# Patient Record
Sex: FEMALE | Race: WHITE | NOT HISPANIC OR LATINO | Employment: OTHER | URBAN - METROPOLITAN AREA
[De-identification: names, ages, dates, MRNs, and addresses within clinical notes are randomized per-mention and may not be internally consistent; named-entity substitution may affect disease eponyms.]

---

## 2017-06-30 ENCOUNTER — HOSPITAL ENCOUNTER (EMERGENCY)
Facility: HOSPITAL | Age: 57
Discharge: HOME | End: 2017-06-30
Attending: FAMILY MEDICINE
Payer: MEDICAID

## 2017-06-30 VITALS
DIASTOLIC BLOOD PRESSURE: 83 MMHG | SYSTOLIC BLOOD PRESSURE: 167 MMHG | RESPIRATION RATE: 18 BRPM | TEMPERATURE: 98 F | HEART RATE: 70 BPM | WEIGHT: 153 LBS
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
## 2017-06-30 DIAGNOSIS — S00.83XA FACIAL CONTUSION, INITIAL ENCOUNTER: Primary | ICD-10-CM

## 2017-06-30 PROCEDURE — 99284 EMERGENCY DEPT VISIT MOD MDM: CPT

## 2017-06-30 RX ORDER — VALPROIC ACID 250 MG/5ML
1000 SOLUTION ORAL 2 TIMES DAILY
COMMUNITY

## 2017-06-30 RX ORDER — TRAMADOL HYDROCHLORIDE 50 MG/1
50 TABLET ORAL EVERY 6 HOURS PRN
Status: ON HOLD | COMMUNITY
End: 2019-06-04 | Stop reason: HOSPADM

## 2017-06-30 RX ORDER — LISINOPRIL 20 MG/1
20 TABLET ORAL DAILY
Status: ON HOLD | COMMUNITY
End: 2017-08-23 | Stop reason: HOSPADM

## 2017-06-30 NOTE — ED PROVIDER NOTES
Encounter Date: 6/30/2017       History     Chief Complaint   Patient presents with    Fall     The history is provided by the patient. No  was used.   Fall   The accident occurred just prior to arrival. The fall occurred while standing. She fell from a height of 3 to 5 ft. She landed on a hard floor. The point of impact was the face. The pain is present in the right knee. The pain is at a severity of 2/10. She was ambulatory at the scene. There was no entrapment after the fall. There was no drug use involved in the accident. There was no alcohol use involved in the accident. Associated symptoms include neck pain. Pertinent negatives include no back pain, no paresthesias, no paralysis, no visual change, no fever, no numbness, no abdominal pain, no bowel incontinence, no nausea, no vomiting, no hematuria, no headaches, no hearing loss, no loss of consciousness and no tingling. She has tried nothing for the symptoms.     Patient was brought in from nursing home after falling today in the cafeteria and hitting her face.  Patient cannot give a good history but she does seem to complain of some nose pain and some right knee pain.  No nausea or vomiting.  No reported loss of consciousness.  No medication was given.    Review of patient's allergies indicates:  No Known Allergies  Past Medical History:   Diagnosis Date    Asthma     Coronary artery disease     Depression     GERD (gastroesophageal reflux disease)     Hypertension     Thyroid disease      No past surgical history on file.  No family history on file.  Social History   Substance Use Topics    Smoking status: Never Smoker    Smokeless tobacco: Not on file    Alcohol use No     Review of Systems   Constitutional: Negative for fever.   Gastrointestinal: Negative for abdominal pain, bowel incontinence, nausea and vomiting.   Genitourinary: Negative for hematuria.   Musculoskeletal: Positive for neck pain. Negative for back pain.    Neurological: Negative for tingling, loss of consciousness, numbness, headaches and paresthesias.       Physical Exam     Initial Vitals [06/30/17 0941]   BP Pulse Resp Temp SpO2   (!) 167/83 70 18 98 °F (36.7 °C) --      MAP       111         Physical Exam    Nursing note and vitals reviewed.  Constitutional: She appears well-developed and well-nourished.   Patient is alert cooperative and in no acute distress.   HENT:   Head: Normocephalic and atraumatic.   Right Ear: External ear normal.   Left Ear: External ear normal.   Nose: Nose normal.       Mouth/Throat: Oropharynx is clear and moist.   Eyes: Conjunctivae and EOM are normal. Pupils are equal, round, and reactive to light.   Neck: Normal range of motion. Neck supple. Spinous process tenderness present. No muscular tenderness present. No edema, no erythema and normal range of motion present. No neck rigidity.   Cardiovascular: Normal rate, regular rhythm and normal heart sounds.   Pulmonary/Chest: Breath sounds normal.   Abdominal: Soft. Bowel sounds are normal.   Musculoskeletal: Normal range of motion.        Right knee: She exhibits normal range of motion, no swelling, no effusion, no ecchymosis, no deformity, no laceration, no erythema, normal alignment, no LCL laxity, normal patellar mobility, no bony tenderness, normal meniscus and no MCL laxity. Tenderness (Superficial diffuse tenderness without instability or deformity noted.) found. No medial joint line, no lateral joint line, no MCL, no LCL and no patellar tendon tenderness noted.   Neurological: She is alert and oriented to person, place, and time. She has normal strength.   Skin: Skin is warm and dry.   Psychiatric: She has a normal mood and affect.         ED Course   Procedures  Labs Reviewed - No data to display                            ED Course     Clinical Impression:   The primary encounter diagnosis was Facial contusion, initial encounter. A diagnosis of Fall was also pertinent to this  visit.                           Justin Cherry MD  06/30/17 [id number]

## 2017-06-30 NOTE — PROVIDER PROGRESS NOTES - EMERGENCY DEPT.
Encounter Date: 6/30/2017    ED Physician Progress Notes        Physician Note:   CAT scan results discussed with the patient.  She has no new complaints.

## 2017-06-30 NOTE — ED TRIAGE NOTES
Here from [facility] with c/o fall from w/c this am. C/o nose and right knee pain

## 2017-07-12 PROBLEM — R17 JAUNDICE: Status: ACTIVE | Noted: 2017-07-12

## 2017-07-13 PROBLEM — R79.1 ELEVATED INR: Status: ACTIVE | Noted: 2017-07-13

## 2017-07-13 PROBLEM — K80.20 CHOLELITHIASIS: Status: ACTIVE | Noted: 2017-07-13

## 2017-07-13 PROBLEM — I10 HYPERTENSION: Status: ACTIVE | Noted: 2017-07-13

## 2017-07-13 PROBLEM — R74.8 ELEVATED LIVER ENZYMES: Status: ACTIVE | Noted: 2017-07-13

## 2017-07-13 PROBLEM — K80.51: Status: ACTIVE | Noted: 2017-07-13

## 2017-07-14 PROBLEM — B18.2 HEP C W/O COMA, CHRONIC: Status: ACTIVE | Noted: 2017-07-14

## 2017-07-14 PROBLEM — B96.20 E-COLI UTI: Status: ACTIVE | Noted: 2017-07-14

## 2017-07-14 PROBLEM — N39.0 E-COLI UTI: Status: ACTIVE | Noted: 2017-07-14

## 2017-07-18 ENCOUNTER — HOSPITAL ENCOUNTER (INPATIENT)
Facility: HOSPITAL | Age: 57
LOS: 1 days | Discharge: ANOTHER HOSPITAL | DRG: 444 | End: 2017-07-19
Attending: HOSPITALIST | Admitting: HOSPITALIST
Payer: MEDICAID

## 2017-07-18 DIAGNOSIS — R17 JAUNDICE: ICD-10-CM

## 2017-07-18 DIAGNOSIS — K80.01 CALCULUS OF GALLBLADDER WITH ACUTE CHOLECYSTITIS AND OBSTRUCTION: Primary | ICD-10-CM

## 2017-07-18 DIAGNOSIS — I25.10 CAD (CORONARY ARTERY DISEASE): ICD-10-CM

## 2017-07-18 PROBLEM — E03.9 HYPOTHYROID: Status: ACTIVE | Noted: 2017-07-18

## 2017-07-18 LAB
ALBUMIN SERPL BCP-MCNC: 2.4 G/DL
ALP SERPL-CCNC: 602 U/L
ALT SERPL W/O P-5'-P-CCNC: 293 U/L
AMMONIA PLAS-SCNC: 45 UMOL/L
ANION GAP SERPL CALC-SCNC: 13 MMOL/L
APTT BLDCRRT: 25.3 SEC
AST SERPL-CCNC: 215 U/L
BASOPHILS # BLD AUTO: 0.05 K/UL
BASOPHILS NFR BLD: 0.4 %
BILIRUB SERPL-MCNC: 14.3 MG/DL
BUN SERPL-MCNC: 10 MG/DL
CALCIUM SERPL-MCNC: 9.1 MG/DL
CHLORIDE SERPL-SCNC: 99 MMOL/L
CO2 SERPL-SCNC: 23 MMOL/L
CREAT SERPL-MCNC: 0.8 MG/DL
DIFFERENTIAL METHOD: ABNORMAL
EOSINOPHIL # BLD AUTO: 0.2 K/UL
EOSINOPHIL NFR BLD: 1.3 %
ERYTHROCYTE [DISTWIDTH] IN BLOOD BY AUTOMATED COUNT: 15.2 %
EST. GFR  (AFRICAN AMERICAN): >60 ML/MIN/1.73 M^2
EST. GFR  (NON AFRICAN AMERICAN): >60 ML/MIN/1.73 M^2
GLUCOSE SERPL-MCNC: 98 MG/DL
HCT VFR BLD AUTO: 31.6 %
HGB BLD-MCNC: 10.6 G/DL
INR PPP: 1
LYMPHOCYTES # BLD AUTO: 2.2 K/UL
LYMPHOCYTES NFR BLD: 17.5 %
MAGNESIUM SERPL-MCNC: 1.6 MG/DL
MCH RBC QN AUTO: 30.5 PG
MCHC RBC AUTO-ENTMCNC: 33.5 %
MCV RBC AUTO: 91 FL
MONOCYTES # BLD AUTO: 0.8 K/UL
MONOCYTES NFR BLD: 6.1 %
NEUTROPHILS # BLD AUTO: 9.3 K/UL
NEUTROPHILS NFR BLD: 72.9 %
PHOSPHATE SERPL-MCNC: 3 MG/DL
PLATELET # BLD AUTO: 170 K/UL
PMV BLD AUTO: 10.6 FL
POTASSIUM SERPL-SCNC: 3.3 MMOL/L
PROT SERPL-MCNC: 6.9 G/DL
PROTHROMBIN TIME: 10.7 SEC
RBC # BLD AUTO: 3.48 M/UL
SODIUM SERPL-SCNC: 135 MMOL/L
VALPROATE SERPL-MCNC: 27.5 UG/ML
WBC # BLD AUTO: 12.77 K/UL

## 2017-07-18 PROCEDURE — 82140 ASSAY OF AMMONIA: CPT

## 2017-07-18 PROCEDURE — 85730 THROMBOPLASTIN TIME PARTIAL: CPT

## 2017-07-18 PROCEDURE — 85610 PROTHROMBIN TIME: CPT | Mod: 91

## 2017-07-18 PROCEDURE — 0F798DZ DILATION OF COMMON BILE DUCT WITH INTRALUMINAL DEVICE, VIA NATURAL OR ARTIFICIAL OPENING ENDOSCOPIC: ICD-10-PCS | Performed by: INTERNAL MEDICINE

## 2017-07-18 PROCEDURE — 80164 ASSAY DIPROPYLACETIC ACD TOT: CPT

## 2017-07-18 PROCEDURE — 84443 ASSAY THYROID STIM HORMONE: CPT

## 2017-07-18 PROCEDURE — 36415 COLL VENOUS BLD VENIPUNCTURE: CPT

## 2017-07-18 PROCEDURE — 83735 ASSAY OF MAGNESIUM: CPT

## 2017-07-18 PROCEDURE — 80053 COMPREHEN METABOLIC PANEL: CPT | Mod: 91

## 2017-07-18 PROCEDURE — 85025 COMPLETE CBC W/AUTO DIFF WBC: CPT

## 2017-07-18 PROCEDURE — 25000003 PHARM REV CODE 250: Performed by: NURSE PRACTITIONER

## 2017-07-18 PROCEDURE — 84100 ASSAY OF PHOSPHORUS: CPT

## 2017-07-18 PROCEDURE — 84439 ASSAY OF FREE THYROXINE: CPT

## 2017-07-18 PROCEDURE — 11000001 HC ACUTE MED/SURG PRIVATE ROOM

## 2017-07-18 RX ORDER — ATORVASTATIN CALCIUM 20 MG/1
20 TABLET, FILM COATED ORAL DAILY
Status: DISCONTINUED | OUTPATIENT
Start: 2017-07-19 | End: 2017-07-19 | Stop reason: HOSPADM

## 2017-07-18 RX ORDER — DOCUSATE SODIUM 100 MG/1
100 CAPSULE, LIQUID FILLED ORAL 2 TIMES DAILY
Status: DISCONTINUED | OUTPATIENT
Start: 2017-07-18 | End: 2017-07-19 | Stop reason: HOSPADM

## 2017-07-18 RX ORDER — ISOSORBIDE MONONITRATE 30 MG/1
30 TABLET, EXTENDED RELEASE ORAL DAILY
Status: DISCONTINUED | OUTPATIENT
Start: 2017-07-19 | End: 2017-07-19 | Stop reason: HOSPADM

## 2017-07-18 RX ORDER — ACETAMINOPHEN 325 MG/1
650 TABLET ORAL EVERY 4 HOURS PRN
Status: DISCONTINUED | OUTPATIENT
Start: 2017-07-18 | End: 2017-07-19 | Stop reason: HOSPADM

## 2017-07-18 RX ORDER — SODIUM CHLORIDE 9 MG/ML
INJECTION, SOLUTION INTRAVENOUS CONTINUOUS
Status: DISCONTINUED | OUTPATIENT
Start: 2017-07-18 | End: 2017-07-19 | Stop reason: HOSPADM

## 2017-07-18 RX ORDER — TIOTROPIUM BROMIDE 18 UG/1
18 CAPSULE ORAL; RESPIRATORY (INHALATION) DAILY
Status: DISCONTINUED | OUTPATIENT
Start: 2017-07-19 | End: 2017-07-19 | Stop reason: HOSPADM

## 2017-07-18 RX ORDER — AMOXICILLIN 250 MG
1 CAPSULE ORAL 2 TIMES DAILY PRN
Status: DISCONTINUED | OUTPATIENT
Start: 2017-07-18 | End: 2017-07-19 | Stop reason: HOSPADM

## 2017-07-18 RX ORDER — LEVOTHYROXINE SODIUM 25 UG/1
25 TABLET ORAL
Status: DISCONTINUED | OUTPATIENT
Start: 2017-07-19 | End: 2017-07-19 | Stop reason: HOSPADM

## 2017-07-18 RX ORDER — MORPHINE SULFATE 2 MG/ML
2 INJECTION, SOLUTION INTRAMUSCULAR; INTRAVENOUS EVERY 4 HOURS PRN
Status: DISCONTINUED | OUTPATIENT
Start: 2017-07-18 | End: 2017-07-19 | Stop reason: HOSPADM

## 2017-07-18 RX ORDER — ASPIRIN 81 MG/1
81 TABLET ORAL DAILY
Status: DISCONTINUED | OUTPATIENT
Start: 2017-07-19 | End: 2017-07-19

## 2017-07-18 RX ORDER — TRAZODONE HYDROCHLORIDE 50 MG/1
100 TABLET ORAL NIGHTLY PRN
Status: DISCONTINUED | OUTPATIENT
Start: 2017-07-18 | End: 2017-07-19 | Stop reason: HOSPADM

## 2017-07-18 RX ORDER — FLUOXETINE HYDROCHLORIDE 20 MG/1
40 CAPSULE ORAL DAILY
Status: DISCONTINUED | OUTPATIENT
Start: 2017-07-19 | End: 2017-07-19 | Stop reason: HOSPADM

## 2017-07-18 RX ORDER — VALPROIC ACID 250 MG/5ML
1000 SOLUTION ORAL EVERY 12 HOURS
Status: DISCONTINUED | OUTPATIENT
Start: 2017-07-18 | End: 2017-07-19 | Stop reason: HOSPADM

## 2017-07-18 RX ORDER — OLANZAPINE 2.5 MG/1
7.5 TABLET ORAL NIGHTLY
Status: DISCONTINUED | OUTPATIENT
Start: 2017-07-18 | End: 2017-07-19 | Stop reason: HOSPADM

## 2017-07-18 RX ORDER — PANTOPRAZOLE SODIUM 40 MG/1
40 TABLET, DELAYED RELEASE ORAL DAILY
Status: DISCONTINUED | OUTPATIENT
Start: 2017-07-19 | End: 2017-07-19 | Stop reason: HOSPADM

## 2017-07-18 RX ORDER — RAMELTEON 8 MG/1
8 TABLET ORAL NIGHTLY PRN
Status: DISCONTINUED | OUTPATIENT
Start: 2017-07-18 | End: 2017-07-19 | Stop reason: HOSPADM

## 2017-07-18 RX ORDER — TRAMADOL HYDROCHLORIDE 50 MG/1
50 TABLET ORAL EVERY 6 HOURS PRN
Status: DISCONTINUED | OUTPATIENT
Start: 2017-07-18 | End: 2017-07-19 | Stop reason: HOSPADM

## 2017-07-18 RX ORDER — NITROGLYCERIN 0.4 MG/1
0.4 TABLET SUBLINGUAL EVERY 5 MIN PRN
Status: DISCONTINUED | OUTPATIENT
Start: 2017-07-18 | End: 2017-07-19 | Stop reason: HOSPADM

## 2017-07-18 RX ORDER — IPRATROPIUM BROMIDE AND ALBUTEROL SULFATE 2.5; .5 MG/3ML; MG/3ML
3 SOLUTION RESPIRATORY (INHALATION) EVERY 4 HOURS PRN
Status: DISCONTINUED | OUTPATIENT
Start: 2017-07-18 | End: 2017-07-19 | Stop reason: HOSPADM

## 2017-07-18 RX ORDER — LISINOPRIL 20 MG/1
20 TABLET ORAL DAILY
Status: DISCONTINUED | OUTPATIENT
Start: 2017-07-19 | End: 2017-07-19 | Stop reason: HOSPADM

## 2017-07-18 RX ADMIN — TRAMADOL HYDROCHLORIDE 50 MG: 50 TABLET, COATED ORAL at 11:07

## 2017-07-18 RX ADMIN — RAMELTEON 8 MG: 8 TABLET, FILM COATED ORAL at 11:07

## 2017-07-18 RX ADMIN — SODIUM CHLORIDE: 0.9 INJECTION, SOLUTION INTRAVENOUS at 10:07

## 2017-07-18 RX ADMIN — VALPROIC ACID 1000 MG: 250 SOLUTION ORAL at 10:07

## 2017-07-18 RX ADMIN — DOCUSATE SODIUM 100 MG: 100 CAPSULE, LIQUID FILLED ORAL at 11:07

## 2017-07-18 RX ADMIN — OLANZAPINE 7.5 MG: 2.5 TABLET, FILM COATED ORAL at 11:07

## 2017-07-19 ENCOUNTER — ANESTHESIA (OUTPATIENT)
Dept: INTENSIVE CARE | Facility: HOSPITAL | Age: 57
DRG: 444 | End: 2017-07-19
Payer: MEDICAID

## 2017-07-19 ENCOUNTER — HOSPITAL ENCOUNTER (INPATIENT)
Facility: HOSPITAL | Age: 57
LOS: 11 days | DRG: 064 | End: 2017-07-31
Attending: EMERGENCY MEDICINE | Admitting: PSYCHIATRY & NEUROLOGY
Payer: MEDICAID

## 2017-07-19 ENCOUNTER — TELEPHONE (OUTPATIENT)
Dept: GASTROENTEROLOGY | Facility: HOSPITAL | Age: 57
End: 2017-07-19

## 2017-07-19 ENCOUNTER — SURGERY (OUTPATIENT)
Age: 57
End: 2017-07-19

## 2017-07-19 ENCOUNTER — ANESTHESIA EVENT (OUTPATIENT)
Dept: INTENSIVE CARE | Facility: HOSPITAL | Age: 57
DRG: 444 | End: 2017-07-19
Payer: MEDICAID

## 2017-07-19 ENCOUNTER — TELEPHONE (OUTPATIENT)
Dept: GASTROENTEROLOGY | Facility: CLINIC | Age: 57
End: 2017-07-19

## 2017-07-19 VITALS
SYSTOLIC BLOOD PRESSURE: 88 MMHG | WEIGHT: 114.44 LBS | TEMPERATURE: 100 F | HEART RATE: 66 BPM | RESPIRATION RATE: 16 BRPM | DIASTOLIC BLOOD PRESSURE: 56 MMHG | OXYGEN SATURATION: 100 % | HEIGHT: 64 IN | BODY MASS INDEX: 19.54 KG/M2

## 2017-07-19 DIAGNOSIS — I61.9 RIGHT-SIDED NONTRAUMATIC INTRACEREBRAL HEMORRHAGE, UNSPECIFIED CEREBRAL LOCATION: Primary | ICD-10-CM

## 2017-07-19 DIAGNOSIS — I63.9 CEREBRAL INFARCTION: ICD-10-CM

## 2017-07-19 DIAGNOSIS — Z01.818 ENCOUNTER FOR INTUBATION: ICD-10-CM

## 2017-07-19 DIAGNOSIS — I61.2 NONTRAUMATIC INTRACEREBRAL HEMORRHAGE IN HEMISPHERE, UNSPECIFIED: ICD-10-CM

## 2017-07-19 DIAGNOSIS — R74.8 ELEVATED LIVER ENZYMES: ICD-10-CM

## 2017-07-19 DIAGNOSIS — G93.6 VASOGENIC CEREBRAL EDEMA: ICD-10-CM

## 2017-07-19 DIAGNOSIS — K72.91 HEPATIC COMA/ENCEPHALOPATHY: ICD-10-CM

## 2017-07-19 DIAGNOSIS — I62.9 SPONTANEOUS INTRAPARENCHYMAL INTRACRANIAL HEMORRHAGE, ACUTE: ICD-10-CM

## 2017-07-19 DIAGNOSIS — R17 JAUNDICE: ICD-10-CM

## 2017-07-19 DIAGNOSIS — K80.01 CALCULUS OF GALLBLADDER WITH ACUTE CHOLECYSTITIS AND OBSTRUCTION: ICD-10-CM

## 2017-07-19 DIAGNOSIS — K80.51: ICD-10-CM

## 2017-07-19 DIAGNOSIS — K80.11 CALCULUS OF GALLBLADDER WITH CHRONIC CHOLECYSTITIS WITH OBSTRUCTION: ICD-10-CM

## 2017-07-19 DIAGNOSIS — B18.2 CHRONIC HEPATITIS C WITH HEPATIC COMA: ICD-10-CM

## 2017-07-19 DIAGNOSIS — I61.9 ICH (INTRACEREBRAL HEMORRHAGE): ICD-10-CM

## 2017-07-19 DIAGNOSIS — I10 ESSENTIAL HYPERTENSION: ICD-10-CM

## 2017-07-19 PROBLEM — D72.829 LEUKOCYTOSIS: Status: ACTIVE | Noted: 2017-07-19

## 2017-07-19 LAB
ABO + RH BLD: NORMAL
ALBUMIN SERPL BCP-MCNC: 2.5 G/DL
ALLENS TEST: ABNORMAL
ALP SERPL-CCNC: 589 U/L
ALT SERPL W/O P-5'-P-CCNC: 244 U/L
AMMONIA PLAS-SCNC: 43 UMOL/L
AMYLASE SERPL-CCNC: 128 U/L
AST SERPL-CCNC: 132 U/L
BILIRUB DIRECT SERPL-MCNC: 7.3 MG/DL
BILIRUB SERPL-MCNC: 9.9 MG/DL
BILIRUB UR QL STRIP: NEGATIVE
BLD GP AB SCN CELLS X3 SERPL QL: NORMAL
CHOLEST/HDLC SERPL: 38.2 {RATIO}
CK MB SERPL-MCNC: 0.8 NG/ML
CK MB SERPL-RTO: 0.7 %
CK SERPL-CCNC: 107 U/L
CLARITY UR REFRACT.AUTO: CLEAR
COLOR UR AUTO: NORMAL
DELSYS: ABNORMAL
ERYTHROCYTE [SEDIMENTATION RATE] IN BLOOD BY WESTERGREN METHOD: 16 MM/H
FIO2: 40
GLUCOSE UR QL STRIP: NEGATIVE
HCO3 UR-SCNC: 24 MMOL/L (ref 24–28)
HDL/CHOLESTEROL RATIO: 2.6 %
HDLC SERPL-MCNC: 11 MG/DL
HDLC SERPL-MCNC: 420 MG/DL
HGB UR QL STRIP: NEGATIVE
INR PPP: 1
KETONES UR QL STRIP: NEGATIVE
LDLC SERPL CALC-MCNC: 342 MG/DL
LEUKOCYTE ESTERASE UR QL STRIP: NEGATIVE
LIPASE SERPL-CCNC: 66 U/L
MIN VOL: 6.67
MODE: ABNORMAL
NITRITE UR QL STRIP: NEGATIVE
NONHDLC SERPL-MCNC: 409 MG/DL
PCO2 BLDA: 26.4 MMHG (ref 35–45)
PEEP: 5
PH SMN: 7.57 [PH] (ref 7.35–7.45)
PH UR STRIP: 8 [PH] (ref 5–8)
PIP: 25
PO2 BLDA: 137 MMHG (ref 80–100)
POC BE: 2 MMOL/L
POC SATURATED O2: 99 % (ref 95–100)
POC TCO2: 25 MMOL/L (ref 23–27)
POCT GLUCOSE: 104 MG/DL (ref 70–110)
PROCALCITONIN SERPL IA-MCNC: 0.16 NG/ML
PROT SERPL-MCNC: 7.3 G/DL
PROT UR QL STRIP: NEGATIVE
PROTHROMBIN TIME: 10.9 SEC
SAMPLE: ABNORMAL
SITE: ABNORMAL
SODIUM SERPL-SCNC: 133 MMOL/L
SODIUM SERPL-SCNC: 134 MMOL/L
SP GR UR STRIP: 1.01 (ref 1–1.03)
SP02: 100
T4 FREE SERPL-MCNC: 1.18 NG/DL
T4 FREE SERPL-MCNC: 1.51 NG/DL
T4 SERPL-MCNC: 12.3 UG/DL
TRIGL SERPL-MCNC: 335 MG/DL
TROPONIN I SERPL DL<=0.01 NG/ML-MCNC: 0.01 NG/ML
TSH SERPL DL<=0.005 MIU/L-ACNC: 3.02 UIU/ML
TSH SERPL DL<=0.005 MIU/L-ACNC: 5.23 UIU/ML
URN SPEC COLLECT METH UR: NORMAL
UROBILINOGEN UR STRIP-ACNC: 4 EU/DL
VT: 400

## 2017-07-19 PROCEDURE — 83690 ASSAY OF LIPASE: CPT

## 2017-07-19 PROCEDURE — 85610 PROTHROMBIN TIME: CPT

## 2017-07-19 PROCEDURE — 25500020 PHARM REV CODE 255: Performed by: EMERGENCY MEDICINE

## 2017-07-19 PROCEDURE — 93005 ELECTROCARDIOGRAM TRACING: CPT

## 2017-07-19 PROCEDURE — 96360 HYDRATION IV INFUSION INIT: CPT | Mod: 59

## 2017-07-19 PROCEDURE — 94761 N-INVAS EAR/PLS OXIMETRY MLT: CPT

## 2017-07-19 PROCEDURE — 86900 BLOOD TYPING SEROLOGIC ABO: CPT

## 2017-07-19 PROCEDURE — 63600175 PHARM REV CODE 636 W HCPCS: Performed by: PHYSICIAN ASSISTANT

## 2017-07-19 PROCEDURE — 96368 THER/DIAG CONCURRENT INF: CPT

## 2017-07-19 PROCEDURE — 82553 CREATINE MB FRACTION: CPT

## 2017-07-19 PROCEDURE — 25000003 PHARM REV CODE 250: Performed by: HOSPITALIST

## 2017-07-19 PROCEDURE — 27000221 HC OXYGEN, UP TO 24 HOURS

## 2017-07-19 PROCEDURE — 99900035 HC TECH TIME PER 15 MIN (STAT)

## 2017-07-19 PROCEDURE — 84484 ASSAY OF TROPONIN QUANT: CPT

## 2017-07-19 PROCEDURE — 99233 SBSQ HOSP IP/OBS HIGH 50: CPT | Mod: ,,, | Performed by: PSYCHIATRY & NEUROLOGY

## 2017-07-19 PROCEDURE — 25000003 PHARM REV CODE 250: Performed by: PHYSICIAN ASSISTANT

## 2017-07-19 PROCEDURE — 36600 WITHDRAWAL OF ARTERIAL BLOOD: CPT

## 2017-07-19 PROCEDURE — 84439 ASSAY OF FREE THYROXINE: CPT

## 2017-07-19 PROCEDURE — 86850 RBC ANTIBODY SCREEN: CPT

## 2017-07-19 PROCEDURE — 25000003 PHARM REV CODE 250: Performed by: STUDENT IN AN ORGANIZED HEALTH CARE EDUCATION/TRAINING PROGRAM

## 2017-07-19 PROCEDURE — 99233 SBSQ HOSP IP/OBS HIGH 50: CPT | Mod: 25,,, | Performed by: PHYSICIAN ASSISTANT

## 2017-07-19 PROCEDURE — 20000000 HC ICU ROOM

## 2017-07-19 PROCEDURE — 94002 VENT MGMT INPAT INIT DAY: CPT

## 2017-07-19 PROCEDURE — 86920 COMPATIBILITY TEST SPIN: CPT

## 2017-07-19 PROCEDURE — 93010 ELECTROCARDIOGRAM REPORT: CPT | Mod: ,,, | Performed by: INTERNAL MEDICINE

## 2017-07-19 PROCEDURE — 96366 THER/PROPH/DIAG IV INF ADDON: CPT

## 2017-07-19 PROCEDURE — 82140 ASSAY OF AMMONIA: CPT

## 2017-07-19 PROCEDURE — 84443 ASSAY THYROID STIM HORMONE: CPT

## 2017-07-19 PROCEDURE — 82962 GLUCOSE BLOOD TEST: CPT

## 2017-07-19 PROCEDURE — 99291 CRITICAL CARE FIRST HOUR: CPT | Mod: 25

## 2017-07-19 PROCEDURE — 25000003 PHARM REV CODE 250: Performed by: NURSE PRACTITIONER

## 2017-07-19 PROCEDURE — 99233 SBSQ HOSP IP/OBS HIGH 50: CPT | Mod: ,,, | Performed by: INTERNAL MEDICINE

## 2017-07-19 PROCEDURE — 5A1955Z RESPIRATORY VENTILATION, GREATER THAN 96 CONSECUTIVE HOURS: ICD-10-PCS | Performed by: PSYCHIATRY & NEUROLOGY

## 2017-07-19 PROCEDURE — 63600175 PHARM REV CODE 636 W HCPCS: Performed by: STUDENT IN AN ORGANIZED HEALTH CARE EDUCATION/TRAINING PROGRAM

## 2017-07-19 PROCEDURE — 96365 THER/PROPH/DIAG IV INF INIT: CPT

## 2017-07-19 PROCEDURE — 87040 BLOOD CULTURE FOR BACTERIA: CPT | Mod: 59

## 2017-07-19 PROCEDURE — 36620 INSERTION CATHETER ARTERY: CPT | Mod: ,,, | Performed by: NURSE PRACTITIONER

## 2017-07-19 PROCEDURE — 51702 INSERT TEMP BLADDER CATH: CPT

## 2017-07-19 PROCEDURE — 83036 HEMOGLOBIN GLYCOSYLATED A1C: CPT

## 2017-07-19 PROCEDURE — 84295 ASSAY OF SERUM SODIUM: CPT | Mod: 91

## 2017-07-19 PROCEDURE — 84436 ASSAY OF TOTAL THYROXINE: CPT

## 2017-07-19 PROCEDURE — 81003 URINALYSIS AUTO W/O SCOPE: CPT

## 2017-07-19 PROCEDURE — 99291 CRITICAL CARE FIRST HOUR: CPT | Mod: ,,, | Performed by: EMERGENCY MEDICINE

## 2017-07-19 PROCEDURE — 80061 LIPID PANEL: CPT

## 2017-07-19 PROCEDURE — 84145 PROCALCITONIN (PCT): CPT

## 2017-07-19 PROCEDURE — 96361 HYDRATE IV INFUSION ADD-ON: CPT | Mod: 59

## 2017-07-19 PROCEDURE — 99284 EMERGENCY DEPT VISIT MOD MDM: CPT | Mod: ,,, | Performed by: PHYSICIAN ASSISTANT

## 2017-07-19 PROCEDURE — 63600175 PHARM REV CODE 636 W HCPCS: Performed by: NURSE PRACTITIONER

## 2017-07-19 PROCEDURE — 63600175 PHARM REV CODE 636 W HCPCS: Performed by: EMERGENCY MEDICINE

## 2017-07-19 PROCEDURE — 96375 TX/PRO/DX INJ NEW DRUG ADDON: CPT

## 2017-07-19 PROCEDURE — 63600175 PHARM REV CODE 636 W HCPCS: Performed by: HOSPITALIST

## 2017-07-19 PROCEDURE — 82803 BLOOD GASES ANY COMBINATION: CPT

## 2017-07-19 PROCEDURE — 80076 HEPATIC FUNCTION PANEL: CPT

## 2017-07-19 PROCEDURE — 99900026 HC AIRWAY MAINTENANCE (STAT)

## 2017-07-19 PROCEDURE — 82150 ASSAY OF AMYLASE: CPT

## 2017-07-19 RX ORDER — LEVETIRACETAM 5 MG/ML
500 INJECTION INTRAVASCULAR EVERY 12 HOURS
Status: DISCONTINUED | OUTPATIENT
Start: 2017-07-20 | End: 2017-07-24

## 2017-07-19 RX ORDER — HYDRALAZINE HYDROCHLORIDE 20 MG/ML
10 INJECTION INTRAMUSCULAR; INTRAVENOUS EVERY 4 HOURS PRN
Status: DISCONTINUED | OUTPATIENT
Start: 2017-07-19 | End: 2017-07-19 | Stop reason: HOSPADM

## 2017-07-19 RX ORDER — FAMOTIDINE 10 MG/ML
20 INJECTION INTRAVENOUS 2 TIMES DAILY
Status: DISCONTINUED | OUTPATIENT
Start: 2017-07-19 | End: 2017-07-19 | Stop reason: HOSPADM

## 2017-07-19 RX ORDER — HYDRALAZINE HYDROCHLORIDE 20 MG/ML
10 INJECTION INTRAMUSCULAR; INTRAVENOUS ONCE
Status: COMPLETED | OUTPATIENT
Start: 2017-07-19 | End: 2017-07-19

## 2017-07-19 RX ORDER — CHLORHEXIDINE GLUCONATE ORAL RINSE 1.2 MG/ML
15 SOLUTION DENTAL 2 TIMES DAILY
Status: DISCONTINUED | OUTPATIENT
Start: 2017-07-19 | End: 2017-07-25

## 2017-07-19 RX ORDER — CHLORHEXIDINE GLUCONATE ORAL RINSE 1.2 MG/ML
15 SOLUTION DENTAL 2 TIMES DAILY
Status: DISCONTINUED | OUTPATIENT
Start: 2017-07-19 | End: 2017-07-19 | Stop reason: HOSPADM

## 2017-07-19 RX ORDER — FAMOTIDINE 20 MG/1
20 TABLET, FILM COATED ORAL 2 TIMES DAILY
Status: DISCONTINUED | OUTPATIENT
Start: 2017-07-19 | End: 2017-07-25

## 2017-07-19 RX ORDER — NICARDIPINE HYDROCHLORIDE 0.2 MG/ML
2.5 INJECTION INTRAVENOUS CONTINUOUS
Status: DISCONTINUED | OUTPATIENT
Start: 2017-07-19 | End: 2017-07-19 | Stop reason: HOSPADM

## 2017-07-19 RX ORDER — DEXMEDETOMIDINE HYDROCHLORIDE 4 UG/ML
0.2 INJECTION, SOLUTION INTRAVENOUS CONTINUOUS
Status: DISCONTINUED | OUTPATIENT
Start: 2017-07-19 | End: 2017-07-20

## 2017-07-19 RX ORDER — LACTULOSE 10 G/15ML
20 SOLUTION ORAL 3 TIMES DAILY
Status: DISCONTINUED | OUTPATIENT
Start: 2017-07-19 | End: 2017-07-19 | Stop reason: HOSPADM

## 2017-07-19 RX ORDER — METRONIDAZOLE 500 MG/100ML
500 INJECTION, SOLUTION INTRAVENOUS
Status: DISCONTINUED | OUTPATIENT
Start: 2017-07-19 | End: 2017-07-19 | Stop reason: HOSPADM

## 2017-07-19 RX ORDER — NICARDIPINE HYDROCHLORIDE 0.2 MG/ML
2.5 INJECTION INTRAVENOUS CONTINUOUS
Status: DISCONTINUED | OUTPATIENT
Start: 2017-07-19 | End: 2017-07-22

## 2017-07-19 RX ORDER — CEFTRIAXONE 1 G/1
1 INJECTION, POWDER, FOR SOLUTION INTRAMUSCULAR; INTRAVENOUS
Status: DISCONTINUED | OUTPATIENT
Start: 2017-07-19 | End: 2017-07-19 | Stop reason: HOSPADM

## 2017-07-19 RX ORDER — LACTULOSE 10 G/15ML
10 SOLUTION ORAL 3 TIMES DAILY
Status: DISCONTINUED | OUTPATIENT
Start: 2017-07-19 | End: 2017-07-28

## 2017-07-19 RX ORDER — PROPOFOL 10 MG/ML
20 INJECTION, EMULSION INTRAVENOUS
Status: DISCONTINUED | OUTPATIENT
Start: 2017-07-19 | End: 2017-07-19

## 2017-07-19 RX ORDER — FENTANYL CITRATE 50 UG/ML
50 INJECTION, SOLUTION INTRAMUSCULAR; INTRAVENOUS
Status: DISCONTINUED | OUTPATIENT
Start: 2017-07-19 | End: 2017-07-19 | Stop reason: HOSPADM

## 2017-07-19 RX ORDER — LEVOTHYROXINE SODIUM 25 UG/1
25 TABLET ORAL
Status: DISCONTINUED | OUTPATIENT
Start: 2017-07-19 | End: 2017-07-26

## 2017-07-19 RX ORDER — LEVETIRACETAM 5 MG/ML
1000 INJECTION INTRAVASCULAR
Status: COMPLETED | OUTPATIENT
Start: 2017-07-19 | End: 2017-07-19

## 2017-07-19 RX ORDER — ETOMIDATE 2 MG/ML
INJECTION INTRAVENOUS
Status: DISCONTINUED | OUTPATIENT
Start: 2017-07-19 | End: 2017-07-19 | Stop reason: HOSPADM

## 2017-07-19 RX ORDER — SUCCINYLCHOLINE CHLORIDE 20 MG/ML
INJECTION INTRAMUSCULAR; INTRAVENOUS
Status: DISCONTINUED | OUTPATIENT
Start: 2017-07-19 | End: 2017-07-19 | Stop reason: HOSPADM

## 2017-07-19 RX ORDER — PROPOFOL 10 MG/ML
INJECTION, EMULSION INTRAVENOUS
Status: DISPENSED
Start: 2017-07-19 | End: 2017-07-20

## 2017-07-19 RX ORDER — SODIUM CHLORIDE 9 MG/ML
INJECTION, SOLUTION INTRAVENOUS CONTINUOUS
Status: DISCONTINUED | OUTPATIENT
Start: 2017-07-19 | End: 2017-07-29

## 2017-07-19 RX ORDER — FENTANYL CITRATE 50 UG/ML
100 INJECTION, SOLUTION INTRAMUSCULAR; INTRAVENOUS
Status: COMPLETED | OUTPATIENT
Start: 2017-07-19 | End: 2017-07-19

## 2017-07-19 RX ORDER — PROPOFOL 10 MG/ML
20 INJECTION, EMULSION INTRAVENOUS
Status: COMPLETED | OUTPATIENT
Start: 2017-07-19 | End: 2017-07-19

## 2017-07-19 RX ORDER — SODIUM CHLORIDE 0.9 % (FLUSH) 0.9 %
3 SYRINGE (ML) INJECTION EVERY 8 HOURS
Status: DISCONTINUED | OUTPATIENT
Start: 2017-07-19 | End: 2017-07-31 | Stop reason: HOSPADM

## 2017-07-19 RX ADMIN — DEXMEDETOMIDINE HYDROCHLORIDE 0.2 MCG/KG/HR: 4 INJECTION, SOLUTION INTRAVENOUS at 09:07

## 2017-07-19 RX ADMIN — NICARDIPINE HYDROCHLORIDE 2.5 MG/HR: 0.2 INJECTION, SOLUTION INTRAVENOUS at 12:07

## 2017-07-19 RX ADMIN — TIOTROPIUM BROMIDE 18 MCG: 18 CAPSULE ORAL; RESPIRATORY (INHALATION) at 08:07

## 2017-07-19 RX ADMIN — LEVETIRACETAM 1000 MG: 5 INJECTION INTRAVENOUS at 04:07

## 2017-07-19 RX ADMIN — HYDRALAZINE HYDROCHLORIDE 10 MG: 20 INJECTION INTRAMUSCULAR; INTRAVENOUS at 09:07

## 2017-07-19 RX ADMIN — PIPERACILLIN AND TAZOBACTAM 4.5 G: 4; .5 INJECTION, POWDER, LYOPHILIZED, FOR SOLUTION INTRAVENOUS; PARENTERAL at 08:07

## 2017-07-19 RX ADMIN — SUCCINYLCHOLINE CHLORIDE 70 MG: 20 INJECTION, SOLUTION INTRAMUSCULAR; INTRAVENOUS at 12:07

## 2017-07-19 RX ADMIN — CHLORHEXIDINE GLUCONATE 15 ML: 1.2 RINSE ORAL at 08:07

## 2017-07-19 RX ADMIN — SODIUM CHLORIDE: 0.9 INJECTION, SOLUTION INTRAVENOUS at 03:07

## 2017-07-19 RX ADMIN — FAMOTIDINE 20 MG: 20 TABLET, FILM COATED ORAL at 08:07

## 2017-07-19 RX ADMIN — FENTANYL CITRATE 50 MCG: 50 INJECTION, SOLUTION INTRAMUSCULAR; INTRAVENOUS at 12:07

## 2017-07-19 RX ADMIN — LACTULOSE 10 G: 20 SOLUTION ORAL at 06:07

## 2017-07-19 RX ADMIN — LEVOTHYROXINE SODIUM 25 MCG: 25 TABLET ORAL at 04:07

## 2017-07-19 RX ADMIN — PIPERACILLIN AND TAZOBACTAM 4.5 G: 4; .5 INJECTION, POWDER, LYOPHILIZED, FOR SOLUTION INTRAVENOUS; PARENTERAL at 04:07

## 2017-07-19 RX ADMIN — VANCOMYCIN HYDROCHLORIDE 750 MG: 750 INJECTION, POWDER, LYOPHILIZED, FOR SOLUTION INTRAVENOUS at 06:07

## 2017-07-19 RX ADMIN — CEFTRIAXONE SODIUM 1 G: 1 INJECTION, POWDER, FOR SOLUTION INTRAMUSCULAR; INTRAVENOUS at 08:07

## 2017-07-19 RX ADMIN — HYDRALAZINE HYDROCHLORIDE 10 MG: 20 INJECTION INTRAMUSCULAR; INTRAVENOUS at 11:07

## 2017-07-19 RX ADMIN — IOHEXOL 75 ML: 350 INJECTION, SOLUTION INTRAVENOUS at 08:07

## 2017-07-19 RX ADMIN — LEVOTHYROXINE SODIUM 25 MCG: 25 TABLET ORAL at 05:07

## 2017-07-19 RX ADMIN — HYDRALAZINE HYDROCHLORIDE 10 MG: 20 INJECTION INTRAMUSCULAR; INTRAVENOUS at 12:07

## 2017-07-19 RX ADMIN — ETOMIDATE 20 MG: 2 INJECTION, SOLUTION INTRAVENOUS at 12:07

## 2017-07-19 RX ADMIN — DEXMEDETOMIDINE HYDROCHLORIDE 0.2 MCG/KG/HR: 4 INJECTION, SOLUTION INTRAVENOUS at 03:07

## 2017-07-19 RX ADMIN — PROPOFOL 10 MCG/KG/MIN: 10 INJECTION, EMULSION INTRAVENOUS at 02:07

## 2017-07-19 RX ADMIN — FENTANYL CITRATE 25 MCG: 50 INJECTION INTRAMUSCULAR; INTRAVENOUS at 09:07

## 2017-07-19 RX ADMIN — SODIUM CHLORIDE: 234 INJECTION INTRAMUSCULAR; INTRAVENOUS; SUBCUTANEOUS at 08:07

## 2017-07-19 RX ADMIN — LACTULOSE 20 G: 20 SOLUTION ORAL at 05:07

## 2017-07-19 NOTE — CONSULTS
Consult Note  Physical Medicine & Rehab  Consult received.  Reason for consult:  assess rehabilitation needs    Patient is intubated and sedated.  She is too acute at this time; rehab potential cannot be appropriately assessed.  Recommend early mobility, OOB in chair 3 hours per day, and PT/OT/SLP when appropriate.  Will sign off.  Please re-consult when patient is medically stable and able to participate with therapy.     Thank you for consult.    RUBIN Callahan, FNP-C  Physical Medicine & Rehabilitation   07/19/2017  Spectralink: [id number]

## 2017-07-19 NOTE — ED PROVIDER NOTES
Encounter Date: 7/19/2017    SCRIBE #1 NOTE: I, Deric Downs, am scribing for, and in the presence of,  Dr. Mayorga. I have scribed the entire note.       History     Chief Complaint   Patient presents with    Head bleed transfer    Altered Mental Status     Time patient was seen by the provider: 1:56 PM      The patient is a 57 y.o. female with hx of: CAD, thyriod disease, HTN, asthma, and GERD that presents to the ED as a transfer for neuro ICU evaluation of right temporal hemorrhage with a complaint of altered mental status. HPI and ROS are limited as the pt is intubated.       The history is provided by medical records.     Review of patient's allergies indicates:  No Known Allergies  Past Medical History:   Diagnosis Date    Asthma     Coronary artery disease     Depression     Emphysema, unspecified     GERD (gastroesophageal reflux disease)     Hypertension     Thyroid disease      History reviewed. No pertinent surgical history.  No family history on file.  Social History   Substance Use Topics    Smoking status: Never Smoker    Smokeless tobacco: Never Used    Alcohol use No     Review of Systems   Unable to perform ROS: Intubated       Physical Exam     Initial Vitals   BP Pulse Resp Temp SpO2   -- -- -- -- --      MAP       --         Physical Exam    Nursing note and vitals reviewed.  Constitutional: She appears well-developed and well-nourished.   Pt is moving spontaneously attempting to remove the tube.    Neck: Neck supple.   Cardiovascular: Normal rate and regular rhythm.   No murmur heard.  Pulmonary/Chest: Breath sounds normal. No respiratory distress.   Abdominal: Soft.   Musculoskeletal: Normal range of motion.   Neurological:   GCS 7t   Skin: Skin is warm and dry.         ED Course   Procedures  Labs Reviewed   LIPID PANEL - Abnormal; Notable for the following:        Result Value    Cholesterol 420 (*)     Triglycerides 335 (*)     HDL 11 (*)     LDL Cholesterol 342.0 (*)      HDL/Chol Ratio 2.6 (*)     Total Cholesterol/HDL Ratio 38.2 (*)     All other components within normal limits   SODIUM - Abnormal; Notable for the following:     Sodium 134 (*)     All other components within normal limits   T4 - Abnormal; Notable for the following:     T4, Total 12.3 (*)     All other components within normal limits   AMYLASE - Abnormal; Notable for the following:     Amylase 128 (*)     All other components within normal limits   LIPASE - Abnormal; Notable for the following:     Lipase 66 (*)     All other components within normal limits   HEPATIC FUNCTION PANEL - Abnormal; Notable for the following:     Albumin 2.5 (*)     Total Bilirubin 9.9 (*)     Bilirubin, Direct 7.3 (*)      (*)      (*)     Alkaline Phosphatase 589 (*)     All other components within normal limits   SODIUM - Abnormal; Notable for the following:     Sodium 133 (*)     All other components within normal limits   SODIUM - Abnormal; Notable for the following:     Sodium 135 (*)     All other components within normal limits   CBC W/ AUTO DIFFERENTIAL - Abnormal; Notable for the following:     RBC 3.04 (*)     Hemoglobin 9.5 (*)     Hematocrit 27.4 (*)     MCH 31.3 (*)     RDW 15.2 (*)     All other components within normal limits   COMPREHENSIVE METABOLIC PANEL - Abnormal; Notable for the following:     Sodium 135 (*)     Potassium 2.8 (*)     Glucose 158 (*)     Albumin 2.2 (*)     Total Bilirubin 7.8 (*)     Alkaline Phosphatase 462 (*)     AST 78 (*)      (*)     All other components within normal limits   ISTAT PROCEDURE - Abnormal; Notable for the following:     POC PH 7.567 (*)     POC PCO2 26.4 (*)     POC PO2 137 (*)     All other components within normal limits   CULTURE, RESPIRATORY   TSH   TROPONIN I   CK-MB   PROTIME-INR   URINALYSIS   T4, FREE   AMMONIA   PROCALCITONIN   TYPE & SCREEN   POCT GLUCOSE MONITORING CONTINUOUS             Medical Decision Making:   History:   Old Medical Records: I  decided to obtain old medical records.  Initial Assessment:   CT shows large right temporal hemorrhage with mass effect. Discussed with neurosurgery and neuro ICU for admission.  Clinical Tests:   Radiological Study: Ordered and Reviewed            Scribe Attestation:   Scribe #1: I performed the above scribed service and the documentation accurately describes the services I performed. I attest to the accuracy of the note.    Attending Attestation:         Attending Critical Care:   Critical Care Times:   ==============================================================  · Total Critical Care Time - exclusive of procedural time: 30 minutes.  ==============================================================  Critical care was necessary to treat or prevent imminent or life-threatening deterioration of the following conditions: stroke.     Physician Attestation for Scribe:  Physician Attestation Statement for Scribe #1: I, Dr. Mayorga, reviewed documentation, as scribed by Deric Downs in my presence, and it is both accurate and complete.         Attending ED Notes:   2:05 PM  Discussed with neurosurgery           ED Course     Clinical Impression:   The primary encounter diagnosis was Right-sided nontraumatic intracerebral hemorrhage, unspecified cerebral location. Diagnoses of Encounter for intubation, ICH (intracerebral hemorrhage), Spontaneous intraparenchymal intracranial hemorrhage, acute, Nontraumatic intracerebral hemorrhage in hemisphere, unspecified, and Cerebral infarction were also pertinent to this visit.                           Spike Mayorga MD  07/21/17 1939

## 2017-07-19 NOTE — CONSULTS
[facility]  Vascular Neurology  Comprehensive Stroke Center  Consult Note    Inpatient consult to Vascular (Stroke) Neurology  Consult performed by: KALEIGH OSCAR  Consult ordered by: ALBERTO FITCH        Assessment/Plan:     Patient is a 57 y.o. year old female with:    * Spontaneous intraparenchymal intracranial hemorrhage, acute    57 y.o. female PMH hepatitis C with transaminitis, CAD, thyroid disease, and h/o CVA on ASA, plavix.    Antiplatelets: None - hemorrhage  Statins: None - hemorrhage  SBP < 140  DVT ppx: TEDs, SCDs  PT/OT and speech to evaluate and treat  Neuro checks qh        Essential hypertension    - Stroke risk factor.  - Management per primary team.  - SBP<140 in setting of bleed.        Elevated liver enzymes    Management per primary team.            Thrombolysis Candidate? No  1. Contraindications: CT findings (ICH, SAH, major infact sign)  2. Warnings: Recent intracranial hemorrhage     Interventional Revascularization Candidate?  No; ICH    Research Candidate? No-->     Subjective:     History of Present Illness:  Malina Robles is a 57 y.o. female with hepatitis C, CAD, thyroid disease, and h/o CVA on ASA, plavix who presents as transfer from [facility], where she was admitted from [facility] for AMS and worsened jaundice. On admission 7/12, INR was 4.9, , . She was noted to be less responsive the morning of 7/19. She underwent CT head that showed acute ICH with IVH, and was transferred to [facility] [place] [place] for higher level of care.    Neurologic Chief Complaint: ICH/IVH    Subjective:     HPI, Past Medical, Family, and Social History remains the same as documented in the initial encounter.     Review of Systems   Constitutional: Positive for activity change and fever.   HENT: Negative for facial swelling.    Respiratory:        Intubated   Gastrointestinal: Negative for vomiting.   Genitourinary:        UTI   Neurological: Negative for seizures.         Altered mental status     Scheduled Meds:   chlorhexidine  15 mL Mouth/Throat BID    famotidine  20 mg Per NG tube BID    [START ON 7/20/2017] levetiracetam in NaCl (iso-os)  500 mg Intravenous Q12H    levothyroxine  25 mcg Per OG tube Before breakfast    piperacillin-tazobactam 4.5 g in dextrose 5 % 100 mL IVPB (ready to mix system)  4.5 g Intravenous Q8H    sodium chloride 0.9%  3 mL Intravenous Q8H    vancomycin (VANCOCIN) IVPB  15 mg/kg Intravenous Once    [START ON 7/20/2017] vancomycin (VANCOCIN) IVPB  750 mg Intravenous Q12H     Continuous Infusions:   sodium chloride 0.9% 50 mL/hr at 07/19/17 1547    dexmedetomidine (PRECEDEX) infusion 1.4 mcg/kg/hr (07/19/17 1657)     PRN Meds:    Objective:     Vital Signs (Most Recent):  Temp: (!) 102.2 °F (39 °C) (07/19/17 1404)  Pulse: 67 (07/19/17 1725)  Resp: 14 (07/19/17 1350)  BP: 137/83 (07/19/17 1702)  SpO2: 99 % (07/19/17 1725)       Vital Signs Range (Last 24H):  Temp:  [98 °F (36.7 °C)-102.2 °F (39 °C)]   Pulse:  []   Resp:  [14-31]   BP: ()/()   SpO2:  [97 %-100 %]        Physical Exam   Constitutional:   Jaundice   HENT:   Head: Normocephalic and atraumatic.   Eyes: Pupils are equal, round, and reactive to light.   Cardiovascular: Normal rate and regular rhythm.    Pulmonary/Chest:   Intubated   Abdominal: Soft.   Neurological: GCS eye subscore is 2 - to pain. GCS verbal subscore is 1 - none (Intubated). GCS motor subscore is 5 - localizes pain.       Neurological Exam:   LOC: does not follow requests, lethargic and sedated on propofol  Language: Intubated  Speech: Intubated  Orientation: Intubated  Pupils (CN III, IV, VI): PERRL  Gag Reflex (CN IX, X): normal/symmetric  Reflexes: flexor plantar responses bilaterally and 2+ throughout  Motor*: Moves all extremities spontaneously. Does not follow commands. Unable to fully assess strength.    NIH Stroke Scale:  Interval: baseline (upon arrival/admit)  Level of Consciousness: 1 -  drowsy (Intubated and sedated)  LOC Questions: 2 - answers none correctly  LOC Commands: 2 - performs neither correctly  Best Gaze: 0 - normal  Visual: 0 - no visual loss  Facial Palsy: 0 - normal  Motor Left Arm: 0 - no drift  Motor Right Arm: 0 - no drift  Motor Left Le - no drift  Motor Right Le - no drift  Limb Ataxia: 0 - absent  Sensory: 0 - normal  Best Language: 0 - no aphasia (Intubated)  Dysarthria: 0 - normal articulation (Intubated)  Extinction and Inattention: 0 - no neglect  NIH Stroke Scale Total: 5  Trino Coma Scale:  Best Eye Response: 2 - to pain  Best Motor Response: 5 - localizes pain  Best Verbal Response: 1 - none (Intubated)  Trino Coma Scale Total: 8  Modified [place] Scale:   Timeline: Prior to symptoms onset  Modified [place] Score: 5 - severe disability    ICH Scale:   [place] Coma Score: 1 - 5-12  Age > or = 80: 0 - no  ICH Volume > or = 30 mL: 0 - no  Intraventricular Hemorrhage: 1 - yes  Infratentorial Origin of Hemorrhage: 0 - no  ICH Scale Total: 2      Laboratory:  CMP:   Recent Labs  Lab 17  1449 17  1455   CALCIUM 9.1  --   --    ALBUMIN 2.4*  --  2.5*   PROT 6.9  --  7.3   * 134*  --    K 3.3*  --   --    CO2 23  --   --    CL 99  --   --    BUN 10  --   --    CREATININE 0.8  --   --    ALKPHOS 602*  --  589*   *  --  244*   *  --  132*   BILITOT 14.3*  --  9.9*     BMP:   Recent Labs  Lab 17  1449   * 134*   K 3.3*  --    CL 99  --    CO2 23  --    BUN 10  --    CREATININE 0.8  --    CALCIUM 9.1  --      CBC:   Recent Labs  Lab 17   WBC 12.77*   RBC 3.48*   HGB 10.6*   HCT 31.6*      MCV 91   MCH 30.5   MCHC 33.5     Lipid Panel:   Recent Labs  Lab 17  1449   CHOL 420*   LDLCALC 342.0*   HDL 11*   TRIG 335*     Coagulation:   Recent Labs  Lab 17  2212 17  1449   INR 1.0 1.0   APTT 25.3  --      Hgb A1C: No results for input(s): HGBA1C in the last 168  hours.  TSH:   Recent Labs  Lab 07/19/17  1449   TSH 3.018       Diagnostic Results:  I have personally reviewed: CT Head. Date: 7/19/17  Findings:   Right temporal ICH with ventricular extension. Remote left frontal and occipital infarcts.          Edith Vera PA-C  [facility] Stroke Center  Department of Vascular Neurology   [facility]

## 2017-07-19 NOTE — ASSESSMENT & PLAN NOTE
Patient presented to [facility] with AMS and increasing Jaundice on 7/12/17. Found to have 2cm stone distal common duct resulting in moderate biliary ductal dilatation.   --ERCP done at [place] and unable to remove stone, but biliary STENT placed.   --GI planned for ERCP with lithotripsy today, but that was placed on hold given the acute ICH.   -- Consulted GI at [facility] today 7/19 to re-evaluate repeat ERCP  -- Will restart Lactulose 10 mg BID  -- Ammonia level 43  -- Pending LFTs   -- Pending further recommendations per GI

## 2017-07-19 NOTE — HPI
Malina Robles is 57 year old female with a PMHx of Asthma, coronary artery disease (S/P Cardiac Surgery), Depression, Emphysema, GERD, Hypertension, and thyroid disease. She lives at [facility].      She was admitted to [facility] from Nursing Home on 7/12/17 with altered mental status and increase jaundice. Nursing home staff stated that she has liver problems but was unsure of the exact diagnosis. They did note that she has increase jaundice over the past 3 days. Patient was awake but a very poor historians. Upon admission PT-INR 4.9, , , Ammonia <9. She was on Plavix prior to admit for coronary artery disease which was upon admission. CT of abdomen on 7/13/17 showed 2cm stone distal common duct resulting in moderate biliary ductal dilatation and 3cm infrarenal abdominal aortic aneurysm. On 7/14 Urine culture positive for gram negative rods. She is being treated with IV Ceftriaxone.  7/15 liver were enzymes worsening.  Today (7/18/17) GI attempted  ERCP , but could not get stone out. Was able to place stent though with only old bile noted behind the stone. No purulent material.  She is being transferred to [facility], admitted to [facility] with consult to GI for repeat ERCP and lithotripsy.    Upon my exam, the patient is awake and alert, but non-verbal. She follows commands and nods yes to every question asked.  She passed a bedside EDGARDO swallow test.  She is grossly jaundiced.  Her Ammonia today is 45, up from < 9 upon admission to [facility].  I will restart lactulose.

## 2017-07-19 NOTE — SUBJECTIVE & OBJECTIVE
Neurologic Chief Complaint: ICH/IVH    Subjective:     HPI, Past Medical, Family, and Social History remains the same as documented in the initial encounter.     Review of Systems   Constitutional: Positive for activity change and fever.   HENT: Negative for facial swelling.    Respiratory:        Intubated   Gastrointestinal: Negative for vomiting.   Genitourinary:        UTI   Neurological: Negative for seizures.        Altered mental status     Scheduled Meds:   chlorhexidine  15 mL Mouth/Throat BID    famotidine  20 mg Per NG tube BID    [START ON 7/20/2017] levetiracetam in NaCl (iso-os)  500 mg Intravenous Q12H    levothyroxine  25 mcg Per OG tube Before breakfast    piperacillin-tazobactam 4.5 g in dextrose 5 % 100 mL IVPB (ready to mix system)  4.5 g Intravenous Q8H    sodium chloride 0.9%  3 mL Intravenous Q8H    vancomycin (VANCOCIN) IVPB  15 mg/kg Intravenous Once    [START ON 7/20/2017] vancomycin (VANCOCIN) IVPB  750 mg Intravenous Q12H     Continuous Infusions:   sodium chloride 0.9% 50 mL/hr at 07/19/17 1547    dexmedetomidine (PRECEDEX) infusion 1.4 mcg/kg/hr (07/19/17 1657)     PRN Meds:    Objective:     Vital Signs (Most Recent):  Temp: (!) 102.2 °F (39 °C) (07/19/17 1404)  Pulse: 67 (07/19/17 1725)  Resp: 14 (07/19/17 1350)  BP: 137/83 (07/19/17 1702)  SpO2: 99 % (07/19/17 1725)       Vital Signs Range (Last 24H):  Temp:  [98 °F (36.7 °C)-102.2 °F (39 °C)]   Pulse:  []   Resp:  [14-31]   BP: ()/()   SpO2:  [97 %-100 %]        Physical Exam   Constitutional:   Jaundice   HENT:   Head: Normocephalic and atraumatic.   Eyes: Pupils are equal, round, and reactive to light.   Cardiovascular: Normal rate and regular rhythm.    Pulmonary/Chest:   Intubated   Abdominal: Soft.   Neurological: GCS eye subscore is 2 - to pain. GCS verbal subscore is 1 - none (Intubated). GCS motor subscore is 5 - localizes pain.       Neurological Exam:   LOC: does not follow requests, lethargic  and sedated on propofol  Language: Intubated  Speech: Intubated  Orientation: Intubated  Pupils (CN III, IV, VI): PERRL  Gag Reflex (CN IX, X): normal/symmetric  Reflexes: flexor plantar responses bilaterally and 2+ throughout  Motor*: Moves all extremities spontaneously. Does not follow commands. Unable to fully assess strength.    NIH Stroke Scale:  Interval: baseline (upon arrival/admit)  Level of Consciousness: 1 - drowsy (Intubated and sedated)  LOC Questions: 2 - answers none correctly  LOC Commands: 2 - performs neither correctly  Best Gaze: 0 - normal  Visual: 0 - no visual loss  Facial Palsy: 0 - normal  Motor Left Arm: 0 - no drift  Motor Right Arm: 0 - no drift  Motor Left Le - no drift  Motor Right Le - no drift  Limb Ataxia: 0 - absent  Sensory: 0 - normal  Best Language: 0 - no aphasia (Intubated)  Dysarthria: 0 - normal articulation (Intubated)  Extinction and Inattention: 0 - no neglect  NIH Stroke Scale Total: 5  [place] Coma Scale:  Best Eye Response: 2 - to pain  Best Motor Response: 5 - localizes pain  Best Verbal Response: 1 - none (Intubated)  [place]ino Coma Scale Total: 8  Modified [place] Scale:   Timeline: Prior to symptoms onset  Modified [place] Score: 5 - severe disability    ICH Scale:   [place] Coma Score: 1 - 5-12  Age > or = 80: 0 - no  ICH Volume > or = 30 mL: 0 - no  Intraventricular Hemorrhage: 1 - yes  Infratentorial Origin of Hemorrhage: 0 - no  ICH Scale Total: 2      Laboratory:  CMP:   Recent Labs  Lab 17  1449 17  1455   CALCIUM 9.1  --   --    ALBUMIN 2.4*  --  2.5*   PROT 6.9  --  7.3   * 134*  --    K 3.3*  --   --    CO2 23  --   --    CL 99  --   --    BUN 10  --   --    CREATININE 0.8  --   --    ALKPHOS 602*  --  589*   *  --  244*   *  --  132*   BILITOT 14.3*  --  9.9*     BMP:   Recent Labs  Lab 17  1449   * 134*   K 3.3*  --    CL 99  --    CO2 23  --    BUN 10  --    CREATININE 0.8  --     CALCIUM 9.1  --      CBC:   Recent Labs  Lab 07/18/17 2212   WBC 12.77*   RBC 3.48*   HGB 10.6*   HCT 31.6*      MCV 91   MCH 30.5   MCHC 33.5     Lipid Panel:   Recent Labs  Lab 07/19/17  1449   CHOL 420*   LDLCALC 342.0*   HDL 11*   TRIG 335*     Coagulation:   Recent Labs  Lab 07/18/17 2212 07/19/17  1449   INR 1.0 1.0   APTT 25.3  --      Hgb A1C: No results for input(s): HGBA1C in the last 168 hours.  TSH:   Recent Labs  Lab 07/19/17  1449   TSH 3.018       Diagnostic Results:  I have personally reviewed: CT Head. Date: 7/19/17  Findings:   Right temporal ICH with ventricular extension. Remote left frontal and occipital infarcts.

## 2017-07-19 NOTE — SUBJECTIVE & OBJECTIVE
Interval History: Noted to have AMS this AM. Did not respond verbally or follow any commands. Did CT of the head that showed acute intraparenchymal hemorrhage in the temporal area. Spoke with [facility] and then with Vascular Neurology and [facility].    Review of Systems   Unable to perform ROS: Acuity of condition     Objective:     Vital Signs (Most Recent):  Temp: 100 °F (37.8 °C) (07/19/17 1215)  Pulse: 103 (07/19/17 1235)  Resp: (!) 29 (07/19/17 1235)  BP: 136/84 (07/19/17 1230)  SpO2: 100 % (07/19/17 1235) Vital Signs (24h Range):  Temp:  [97.4 °F (36.3 °C)-100.9 °F (38.3 °C)] 100 °F (37.8 °C)  Pulse:  [] 103  Resp:  [15-29] 29  SpO2:  [97 %-100 %] 100 %  BP: (130-190)/(63-98) 136/84     Weight: 51.9 kg (114 lb 6.7 oz)  Body mass index is 19.64 kg/m².    Intake/Output Summary (Last 24 hours) at 07/19/17 1251  Last data filed at 07/18/17 2258   Gross per 24 hour   Intake                0 ml   Output              300 ml   Net             -300 ml      Physical Exam   Constitutional: She appears well-developed.   Cardiovascular: Normal rate and regular rhythm.    No murmur heard.  Pulmonary/Chest: Effort normal and breath sounds normal. No respiratory distress. She has no wheezes.   Abdominal: Soft. Bowel sounds are normal. She exhibits no distension. There is no tenderness.   Musculoskeletal: She exhibits no edema.   Neurological:   Mental status will respond to painful stimuli and localize pain. Opens eyes intermittently. Non-verbal currently.    Skin: Skin is warm and dry.   Nursing note and vitals reviewed.      Significant Labs:   CBC:   Recent Labs  Lab 07/18/17 0419 07/18/17 2212   WBC 8.50 12.77*   HGB 9.8* 10.6*   HCT 28.3* 31.6*   * 170     CMP:   Recent Labs  Lab 07/18/17  0419 07/18/17 2212    135*   K 3.1* 3.3*    99   CO2 21* 23   GLU 84 98   BUN 13 10   CREATININE 0.70 0.8   CALCIUM 8.6 9.1   PROT 6.6 6.9   ALBUMIN 3.0* 2.4*   BILITOT 14.4* 14.3*   ALKPHOS 484* 602*   * 215*    * 293*   ANIONGAP  --  13   EGFRNONAA >60 >60     Coagulation:   Recent Labs  Lab 07/18/17  2212   INR 1.0   APTT 25.3     Magnesium:   Recent Labs  Lab 07/18/17  2212   MG 1.6       Significant Imaging: CT: I have reviewed all pertinent results/findings within the past 24 hours and my personal findings are:  Acute intraparnchymal hemorrhage  I have reviewed all pertinent imaging results/findings within the past 24 hours.

## 2017-07-19 NOTE — ASSESSMENT & PLAN NOTE
Jaundice, Cholelithiasis, Elevated Liver Enzymes, Chronic Hepatitis C, Encephalopathy  Patient presented to [facility] with AMS and increasing Jaundice on 7/12/17. Found to have 2cm stone distal common duct resulting in moderate biliary ductal dilatation. ERCP done at [facility] and unable to remove stone, but biliary STENT placed. Patient is currently very jaundiced and has altered mental status.   Alk Phos 602 (prio 484)  Tbili 14.3 (prior 140.4)   (prior 372)   (prior 346)  Ammonia 45 (prio < 8)  --GI Consult for ERCP and Lithotripsy.   --Keep NPO  --IV FLuids  --Restart Lactulose TID

## 2017-07-19 NOTE — HOSPITAL COURSE
7/19: AMS this AM. CT head shows R temporal ICH with IVH. [place] [facility] and [place] to [facility]. Of note, patient's initial INR was 4.9.     07/20: remains intubated and on cardene, patient not sedated. See by GI for bile duct stone and plans to repeat ERCP. Patient with leukocytosis-cultures pending, on vanc/zosyn.   7/21/17 Remains intubated. CTA without abnormality to explain ICH.   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 Globally aphasic. Intermittent jaw movements. 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 Pending  EEG report. Pending ERCG and PEG.   7/27/17: neuro exam stable, still not following commands; d/c keppra, repeat EEG/MBSS tomorrow; ERCP next week  7/28 - continues to be difficult to arouse in the mornings, but is more awake around noon, ordered MRI brain to assess for mass

## 2017-07-19 NOTE — ED NOTES
Bed: 02  Expected date: 7/19/17  Expected time: 12:36 PM  Means of arrival:   Comments:  Transfer

## 2017-07-19 NOTE — ANESTHESIA PROCEDURE NOTES
Intubation    Diagnosis: GCS < 8, unable to protect airway  Patient location during procedure: ICU  Procedure start time: 7/19/2017 12:30 PM  Timeout: 7/19/2017 12:30 PM  Procedure end time: 7/19/2017 12:33 PM  Staffing  Anesthesiologist: MARLENY MCDANIEL  Resident/CRNA: ROBBI SOLANO  Performed: resident/CRNA   Preanesthetic Checklist  Completed: patient identified, site marked, surgical consent, pre-op evaluation, timeout performed, IV checked, risks and benefits discussed, monitors and equipment checked and anesthesia consent given  Intubation  Indication: airway protection  Pre-oxygenation. Induction: intravenous, mask ventilation: n/a., laryngoscopy direct, Guy 3.  Endotracheal Tube: oral, 7.5 mm ID, cuffed (inflated to minimal occlusive pressure)  Attempts: 1, Grade I - full view of cords  Complicating Factors: none  Tube secured at 22 cm at the lips.  Findings post-intubation: bilateral breath sounds, positive ETCO2, atraumatic / condition of teeth unchanged  Position Confirmation: auscultation  Additional Notes  Induced with 70 mg of succinylcholine and 20 mg of etomidate

## 2017-07-19 NOTE — PT/OT/SLP PROGRESS
Physical Therapy      Malina Robles  MRN: [id number]  [id number]  Patient not seen today secondary to t/f to ICU 2/2 change in status  . Will d/c PT orders at this time.     Emily Carrillo, PT   7/19/2017

## 2017-07-19 NOTE — ASSESSMENT & PLAN NOTE
- d/w Dr. Galindo and Kwasi, plan to repeat ERCP    - risk factors CAD, chronically ill, bacteremia  - monitor LFTs  - monitor cultures  - abx  - NPO

## 2017-07-19 NOTE — SIGNIFICANT EVENT
"Notified by pt's nurse that Radiologist reported a "large acute intracranial hemorrhage" seen on CT head. I notified Dr. Galindo, Attending MD, and reported to pt's bedside immediately.  She was in no acute distress; did not follow commands except that she gripped my hand with her left hand.  Pupils sluggishly reactive to light. Right arm is chronically contracted from prior stroke.      I called [facility] ([phone]) where patient has lived since 2011.  I spoke to her nurse Diana. Director of Nursing is Katherine Tsai.  Diana confirmed that patient signed DNR form with an "X" in 2011 when she was admitted. This form was reviewed with her on 6/1/2017 and she verbally agreed to it again.  Diana is faxing the form to the 4th floor at [facility] (4A side). Diana confirms that patient is non-ambulatory at baseline. She has a safety belt for her wheelchair which she often unbuckles and falls out of. When asked why she unbuckles the belt she states "because I want to fall".      Diana states that the patient talks and feeds herself at baseline. She states that she has a son, Scott Robles, who last saw patient 2 years ago at the nursing home.  Last phone number listed in her chart is [phone]. I tried calling that number with both [phone] and [phone] area codes and got a fast busy signal.  I looked him up on the internet white pages and made a call to one number in [place] that looked promising but it was a business and he did not work there.      Informed Diana at [facility] that we will treat the patient for this acute hemorrhagic CVA including transferring her over to [facility] for Neuro-Critical Care.  I gave her my phone number to call with any additional information.      May Lorenzo PA-C  [facility]  Pager: [phone]    "

## 2017-07-19 NOTE — HPI
Mrs.. Robles is a 57 y.o. F with PMHx of HTN, CAD, hypothyroidism, and Hep C with with elevated liver enzymes.  Transferred from [facility] today for evaluation of ICH.   Pt was admitted for Liver issues and hyperbilirubinemia.  She was found to have a common bile duct stone and was scheduled for ERCP today, she has change in MS today, HCT revealed right temporal ICH.  She was intubated upon arrival at ED.  No family present during examination.  History obtained from chart.  Intitla presenting INR on 7/12 was 4.9, today INR is 1.0. Pt on aspirin and plavix at home, has reportedly been held x 7 days.

## 2017-07-19 NOTE — TELEPHONE ENCOUNTER
Case discussed with Dr. Galindo ( request for ERCP )and lithotripsy ) info obtained from Nursing home .  Altered mental  Status . Reportedly alert and oriented and able to sign consents. Dr. Ayala and I agreed tp evaluate the new MS changes . CT planned await results.

## 2017-07-19 NOTE — SUBJECTIVE & OBJECTIVE
Past Medical History:   Diagnosis Date    Asthma     Coronary artery disease     Depression     Emphysema, unspecified     GERD (gastroesophageal reflux disease)     Hypertension     Thyroid disease        History reviewed. No pertinent surgical history.    Review of patient's allergies indicates:  No Known Allergies    Current Facility-Administered Medications on File Prior to Encounter   Medication    [COMPLETED] potassium chloride 10 mEq in 100 mL IVPB    [DISCONTINUED] 0.9%  NaCl infusion    [DISCONTINUED] 0.9%  NaCl infusion    [DISCONTINUED] albuterol inhaler 1 puff    [DISCONTINUED] albuterol inhaler    [DISCONTINUED] amlodipine tablet 5 mg    [DISCONTINUED] cefTRIAXone (ROCEPHIN) 1 g in dextrose 5 % 50 mL IVPB    [DISCONTINUED] diphenhydrAMINE injection 25 mg    [DISCONTINUED] docusate sodium capsule 100 mg    [DISCONTINUED] ergocalciferol capsule 50,000 Units    [DISCONTINUED] fentaNYL injection    [DISCONTINUED] fluoxetine capsule 40 mg    [DISCONTINUED] folic acid-vit B6-vit B12 2.5-25-2 mg tablet 1 tablet    [DISCONTINUED] glycopyrrolate injection    [DISCONTINUED] hydrocodone-chlorpheniramine 10-8 mg/5 mL suspension 5 mL    [DISCONTINUED] HYDROmorphone injection 0.2 mg    [DISCONTINUED] iopamidol 76 % injection 100 mL    [DISCONTINUED] iothalamate meglumine 43 % injection 50 mL    [DISCONTINUED] isosorbide mononitrate 24 hr tablet 30 mg    [DISCONTINUED] lactated ringers infusion    [DISCONTINUED] levothyroxine tablet 25 mcg    [DISCONTINUED] lidocaine (PF) 10 mg/ml (1%) injection    [DISCONTINUED] lisinopril tablet 40 mg    [DISCONTINUED] meperidine (PF) injection 12.5 mg    [DISCONTINUED] morphine injection 2 mg    [DISCONTINUED] neostigmine methylsulfate injection    [DISCONTINUED] nitroGLYCERIN SL tablet 0.4 mg    [DISCONTINUED] omega-3 fatty acids-fish oil 340-1,000 mg capsule 1 capsule    [DISCONTINUED] ondansetron injection 4 mg    [DISCONTINUED] ondansetron  injection    [DISCONTINUED] pantoprazole EC tablet 40 mg    [DISCONTINUED] phenylephrine injection    [DISCONTINUED] promethazine (PHENERGAN) 12.5 mg in dextrose 5 % 50 mL IVPB    [DISCONTINUED] propofol (DIPRIVAN) 10 mg/mL infusion    [DISCONTINUED] rocuronium injection    [DISCONTINUED] sodium chloride 0.9% flush 3 mL    [DISCONTINUED] succinylcholine injection    [DISCONTINUED] tiotropium inhalation capsule 18 mcg    [DISCONTINUED] valproic acid (as sodium salt) 250 mg/5 mL (5 mL) syrup Soln 1,000 mg     Current Outpatient Prescriptions on File Prior to Encounter   Medication Sig    albuterol sulfate 90 mcg/actuation AePB Inhale 180 mcg into the lungs every 6 (six) hours as needed. Rescue    aspirin (ECOTRIN) 81 MG EC tablet Take 81 mg by mouth once daily.    atorvastatin (LIPITOR) 20 MG tablet Take 20 mg by mouth once daily.    clopidogrel (PLAVIX) 75 mg tablet Take 75 mg by mouth once daily.    docusate sodium (COLACE) 100 MG capsule Take 100 mg by mouth 2 (two) times daily.    ergocalciferol (VITAMIN D2) 50,000 unit Cap Take 50,000 Units by mouth every 7 days.    fish oil-omega-3 fatty acids 300-1,000 mg capsule Take 1 g by mouth 2 (two) times daily.     fluoxetine (PROZAC) 40 MG capsule Take 40 mg by mouth once daily.    IRON/FA/DHA/EPA/FAD/NADH/MV47 (ENLYTE, FERROUS GLYCINE, ORAL) Take 30 mg by mouth once daily at 6am.    isosorbide mononitrate (IMDUR) 30 MG 24 hr tablet Take 10 mg by mouth 2 (two) times daily.     lactulose (CEPHULAC) 10 gram packet Take 10 g by mouth 3 (three) times daily.    levothyroxine (SYNTHROID) 25 MCG tablet Take 25 mcg by mouth once daily.    lisinopril (PRINIVIL,ZESTRIL) 20 MG tablet Take 20 mg by mouth once daily.    nitroGLYCERIN (NITROSTAT) 0.4 MG SL tablet Place 0.4 mg under the tongue every 5 (five) minutes as needed.    olanzapine (ZYPREXA) 20 MG tablet Take 7.5 mg by mouth every evening.     pantoprazole (PROTONIX) 40 MG tablet Take 40 mg by mouth  once daily.    tiotropium (SPIRIVA) 18 mcg inhalation capsule Inhale 18 mcg into the lungs once daily.    tramadol (ULTRAM) 50 mg tablet Take 50 mg by mouth every 6 (six) hours as needed for Pain.    trazodone (DESYREL) 100 MG tablet Take 100 mg by mouth nightly as needed for Insomnia.    valproate (DEPAKENE) 250 mg/5 mL syrup Take 1,000 mg by mouth 2 (two) times daily.     Family History     None        Social History Main Topics    Smoking status: Never Smoker    Smokeless tobacco: Never Used    Alcohol use No    Drug use: No    Sexual activity: No     Review of Systems   Unable to perform ROS: Acuity of condition (Patient is non-verbal at this time. Unsure of Baseline)     Objective:     Vital Signs (Most Recent):  Temp: 98.6 °F (37 °C) (07/18/17 2200)  Pulse: 74 (07/19/17 0300)  Resp: 18 (07/18/17 2200)  BP: (!) 190/98 (07/18/17 2328)  SpO2: 99 % (07/18/17 2200) Vital Signs (24h Range):  Temp:  [97.4 °F (36.3 °C)-98.6 °F (37 °C)] 98.6 °F (37 °C)  Pulse:  [57-82] 74  Resp:  [15-18] 18  SpO2:  [96 %-100 %] 99 %  BP: (130-190)/(63-98) 190/98        There is no height or weight on file to calculate BMI.    Physical Exam   Constitutional: She is oriented to person, place, and time. She appears well-developed. No distress.   HENT:   Head: Normocephalic and atraumatic.   Mouth/Throat: Oropharynx is clear and moist. No oropharyngeal exudate.   Eyes: Pupils are equal, round, and reactive to light. Scleral icterus is present.   Neck: Neck supple.   Cardiovascular: Normal rate, regular rhythm, normal heart sounds and intact distal pulses.  Exam reveals no gallop and no friction rub.    No murmur heard.  Pulmonary/Chest: Effort normal and breath sounds normal. No respiratory distress. She has no wheezes. She has no rales.   Abdominal: Soft. Bowel sounds are normal. She exhibits distension (Mild). There is tenderness. There is no rebound and no guarding.   Musculoskeletal: She exhibits no edema, tenderness or  deformity.   Neurological: She is alert and oriented to person, place, and time. She exhibits normal muscle tone.   Skin: Skin is warm and dry. Capillary refill takes 2 to 3 seconds. No rash noted. She is not diaphoretic.   Jaundiced   Psychiatric: Her affect is inappropriate. She is withdrawn. Cognition and memory are impaired.   Nonverbal   Nursing note and vitals reviewed.       Significant Labs:   CBC:   Recent Labs  Lab 07/18/17 0419 07/18/17 2212   WBC 8.50 12.77*   HGB 9.8* 10.6*   HCT 28.3* 31.6*   * 170     CMP:   Recent Labs  Lab 07/18/17 0419 07/18/17 2212    135*   K 3.1* 3.3*    99   CO2 21* 23   GLU 84 98   BUN 13 10   CREATININE 0.70 0.8   CALCIUM 8.6 9.1   PROT 6.6 6.9   ALBUMIN 3.0* 2.4*   BILITOT 14.4* 14.3*   ALKPHOS 484* 602*   * 215*   * 293*   ANIONGAP  --  13   EGFRNONAA >60 >60     Coagulation:   Recent Labs  Lab 07/18/17 2212   INR 1.0   APTT 25.3      Ref Range & Units 07/18/17 2212   Valproic Acid Lvl 50.0 - 100.0 ug/mL 27.5    Comments: Valproic Acid (ug/ml)   Toxic:   >100.0 ug/ml      Ammonia   Status:  Final result    Ref Range & Units 07/18/17 2212   Ammonia 10 - 50 umol/L 45   Resulting Agency  [facility]        Specimen Collected: 07/18/17 2212 Last Resulted: 07/18/17 2302             Significant Imaging: I have reviewed all pertinent imaging results/findings within the past 24 hours.   Imaging from [facility]:  Chest X-Ray  Impression      No evidence of an acute pulmonary process.  ET tube in place.      Electronically signed by: JESS FERNANDEZ MD  Date: 07/18/17  Time: 15:39      FL ERCP  Narrative     Fluoroscopic images obtained during ERCP    History: Bile duct calculus    Findings: Intraoperative fluoroscopic images obtained during ERCP demonstrate intra-and extrahepatic biliary ductal dilatation with subsequent placement of a biliary stent.   Impression      Biliary stent placement.      Electronically signed by: DARIN ZAZUETA MD  Date:  07/18/17  Time: 15:15      Abdominal CT  Impression       1.  2 cm stone distal common duct resulting in moderate biliary ductal dilatation.  2.  3 cm infrarenal abdominal aortic aneurysm.  3.  cortical scarring right kidney.      Electronically signed by: NITA SALGADO MD  Date: 07/13/17  Time: 08:53

## 2017-07-19 NOTE — SUBJECTIVE & OBJECTIVE
Interval History:  7/19 admitted to Neuro Critical Care as a transfer from [facility] s/p Right Temporal ICH 4.2 cm ; recent Biliary Stent 7/18 at outside hospital  Cultured today, consulted GI for repeat of ERCP; started abx, pending CTA      Review of Systems   Unable to obtain a complete ROS due to level of consciousness.  Objective:     Vitals:  Temp: (!) 102.2 °F (39 °C) (07/19/17 1404)  Pulse: 75 (07/19/17 1702)  Resp: 14 (07/19/17 1350)  BP: 137/83 (07/19/17 1702)  SpO2: 100 % (07/19/17 1702)    Temp:  [98 °F (36.7 °C)-102.2 °F (39 °C)] 102.2 °F (39 °C)  Pulse:  [] 75  Resp:  [14-31] 14  SpO2:  [97 %-100 %] 100 %  BP: ()/() 137/83         Vent Mode: A/C  Oxygen Concentration (%):  [] 40  Resp Rate Total:  [16 br/min-23 br/min] 16 br/min  Vt Set:  [400 mL] 400 mL  PEEP/CPAP:  [5 cmH20] 5 cmH20  Pressure Support:  [0 cmH20] 0 cmH20  Mean Airway Pressure:  [8 cmH20-9.5 cmH20] 9.5 cmH20    No intake/output data recorded.    Physical Exam     Physical Exam:  GA: Intubated, Sedated Does not follow commands, Jaundiced, comfortable, no acute distress.   HEENT: + scleral icterus or JVD.   Pulmonary: Clear to auscultation Anterior. No wheezing, crackles, or rhonchi.  Cardiac: RRR S1 & S2 w/o rubs/murmurs/gallops.   Abdominal:  No appreciable hepatosplenomegaly.  Skin: + jaundice,  Neuro:  --GCS: E4 VT 5  --Mental Status:  Intubated, Sedated Does not follow commands, Jaundiced,  --CN II-XII unable to fully assess  --Pupils 4mm, PERRL.   --Corneal reflex, gag, cough intact.  --LUE strength: 3/5 weakness /contracted  --RUE strength: 4/5  --LLE strength: 4/5  --RLE strength: 4/5  Unable to test gait due to level of consciousness.    Medications:  Continuous  sodium chloride 0.9% Last Rate: 50 mL/hr at 07/19/17 1547   dexmedetomidine (PRECEDEX) infusion Last Rate: 1.4 mcg/kg/hr (07/19/17 5887)   Scheduled  chlorhexidine 15 mL BID   famotidine 20 mg BID   [START ON 7/20/2017] levetiracetam in NaCl  (iso-os) 500 mg Q12H   levothyroxine 25 mcg Before breakfast   piperacillin-tazobactam 4.5 g in dextrose 5 % 100 mL IVPB (ready to mix system) 4.5 g Q8H   sodium chloride 0.9% 3 mL Q8H   vancomycin (VANCOCIN) IVPB 15 mg/kg Once   [START ON 7/20/2017] vancomycin (VANCOCIN) IVPB 750 mg Q12H   PRN   Today I personally reviewed pertinent medications, lines/drains/airways, lab results, notably:

## 2017-07-19 NOTE — SUBJECTIVE & OBJECTIVE
Subjective:     Interval History:     Review of Systems   Unable to perform ROS: Mental status change   All other systems reviewed and are negative.    Objective:     Vital Signs (Most Recent):  Temp: (!) 100.9 °F (38.3 °C) (07/19/17 0759)  Pulse: 70 (07/19/17 0800)  Resp: 18 (07/19/17 0800)  BP: (!) 162/78 (07/19/17 0759)  SpO2: 97 % (07/19/17 0800) Vital Signs (24h Range):  Temp:  [97.4 °F (36.3 °C)-100.9 °F (38.3 °C)] 100.9 °F (38.3 °C)  Pulse:  [59-82] 70  Resp:  [15-18] 18  SpO2:  [97 %-100 %] 97 %  BP: (130-190)/(63-98) 162/78        There is no height or weight on file to calculate BMI.      Intake/Output Summary (Last 24 hours) at 07/19/17 0921  Last data filed at 07/18/17 2258   Gross per 24 hour   Intake                0 ml   Output              300 ml   Net             -300 ml       Lines/Drains/Airways     Peripheral Intravenous Line                 Peripheral IV - Single Lumen 07/17/17 0039 Left Antecubital 2 days         Peripheral IV - Single Lumen 07/18/17 0545 Right Forearm 1 day                Physical Exam   Constitutional: She appears well-developed. No distress.   Elderly appearing   HENT:   Head: Normocephalic and atraumatic.   Eyes: Conjunctivae are normal. Scleral icterus is present.   Neck: Normal range of motion. Neck supple. No tracheal deviation present. No thyromegaly present.   Cardiovascular: Normal rate and regular rhythm.  Exam reveals no gallop and no friction rub.    No murmur heard.  Pulmonary/Chest: Effort normal and breath sounds normal. No respiratory distress. She has no wheezes.   Abdominal: Soft. Bowel sounds are normal. She exhibits distension. There is tenderness.   Musculoskeletal:        Right wrist: She exhibits normal range of motion and no tenderness.        Left wrist: She exhibits normal range of motion and no tenderness.   Lymphadenopathy:        Head (right side): No submental and no submandibular adenopathy present.        Head (left side): No submental and no  submandibular adenopathy present.   Neurological:   Awake, unable to fully assess given status   Skin: Skin is warm and dry. No rash noted. She is not diaphoretic. No erythema.   Psychiatric:   Altered, not speaking   Nursing note and vitals reviewed.      Significant Labs:  CMP:   Recent Labs  Lab 07/18/17  2212   GLU 98   CALCIUM 9.1   ALBUMIN 2.4*   PROT 6.9   *   K 3.3*   CO2 23   CL 99   BUN 10   CREATININE 0.8   ALKPHOS 602*   *   *   BILITOT 14.3*     Coagulation:   Recent Labs  Lab 07/18/17  2212   INR 1.0   APTT 25.3         Significant Imaging:  Imaging results within the past 24 hours have been reviewed.

## 2017-07-19 NOTE — HPI
This is a 56yo female transferred here for evaluation of choledocholithiasis. She presented from a nursing home when she was noted to be lethargic and found to have elevated liver function tests.  She is significantly jaundiced and was noted to have some low-grade temperatures overnight.  Workup at the outside hospital was reviewed noting dilated bile ducts with suspected bile duct stone and she underwent ERCP below the stone was unable to be extracted.  A biliary stent was placed.  Dr. Ayala and I have also discussed the case.  Currently she isn't unable to give any additional history at this time due to her mental status.  No vomiting.  No changes in bowel habits.

## 2017-07-19 NOTE — TELEPHONE ENCOUNTER
----- Message from Karli Zuñiga sent at 2017  8:26 AM CDT -----  Contact: [facility] [phone]  CONSULTS:     Patient: Malina Robles    :     Clinic#: [id number]    Room number: 427     Referring MD: GA Huitron     Diagnosis: ERCP     Person calling: Claudette

## 2017-07-19 NOTE — ASSESSMENT & PLAN NOTE
57 y.o. female PMH hepatitis C with transaminitis, CAD, thyroid disease, and h/o CVA on ASA, plavix.    Antiplatelets: None - hemorrhage  Statins: None - hemorrhage  SBP < 140  DVT ppx: TEDs, SCDs  PT/OT and speech to evaluate and treat  Neuro checks qh

## 2017-07-19 NOTE — ED TRIAGE NOTES
Pt transferred from [place] to [facility] for altered mental status. Pt had a elevated bilirubin and was there for an inpatient ERCP. Pt was confused GCS 6  Intubated due to decline. A ct was done with findings of a bleed.  PT Cardene was D/C prior to arrival. Pt arrived via [facility] intubated tube size 7.5 secured 22 at the lip. Pt arrived with IV access 20g LAC and 20 G RFA.  Pt had a fall June 30 with a negative ct.

## 2017-07-19 NOTE — ASSESSMENT & PLAN NOTE
Jaundice, Cholelithiasis, Elevated Liver Enzymes, Chronic Hepatitis C  Patient presented to [facility] with AMS and increasing Jaundice on 7/12/17. Found to have 2cm stone distal common duct resulting in moderate biliary ductal dilatation. ERCP done at [place] and unable to remove stone, but biliary STENT placed. GI planned for ERCP with lithotripsy today, but that was placed on hold given the acute ICH.

## 2017-07-19 NOTE — ASSESSMENT & PLAN NOTE
Urine culture from 7/12/17 grew E-Coli:   7d ago   Urine Culture, Routine  ESCHERICHIA COLI   10,000 - 49,999 cfu/ml      --Continue Cefrtriaxone 1 gm IV daily

## 2017-07-19 NOTE — PLAN OF CARE
0912 nursing assessing patient.  Patient will not open her eyes, right hand decorticate posturing, patient responds to pain, but will not speak or follow commands.  Charge nurse, house supervisor and Dr Galindo notified.     0917 Temp 100.9            B/p 198/102 HR 73             Normal sinus on tele              B/S 109             Charge nurse, house supervisor and nurse at                    Bedside.  0920     B/p  219/110 HR 72   0927    Dr Galindo at bedside  0941     211/106 hr 72  1101     Patient in CT  1113     Nurse notified by CT patient has a bleed  1114     Dr Galindo and May NP notified of bleed  1120    Patient returned to room May NP, Dr Galindo, charge nurse and house supervisor at                Bedside.  1145      Hydralizine 10mg given IV push.    1152      B/P 168/92  HR 78  1154      B/P  182/90 HR 77   1156      B/P   158/82 HR 80  1210      Patient transferred to ICU

## 2017-07-19 NOTE — HPI
Ms. Malina Robles is 57 year old female with a PMHx of prior stroke,  Asthma, coronary artery disease (S/P Cardiac Surgery), Depression, Emphysema, GERD, Hypertension, thyroid disease, with recent Biliary Stent 7/18,  who presents to Neuro Critical Care as a transfer from [facility] s/p Right Temporal ICH. Per the chart,  she was admitted to [facility] from Nursing Home on 7/12/17 with altered mental status and increase jaundice upon admission PT-INR 4.9, , , Ammonia <9 on 7/12. She was on Plavix prior to admit for coronary artery disease which was upon admission.CT of abdomen on 7/13/17 showed 2cm stone distal common duct resulting in moderate biliary ductal dilatation and 3cm infrarenal abdominal aortic aneurysm. On 7/14 Urine culture positive for gram negative rods. She is being treated with IV Ceftriaxone.  7/15 liver were enzymes worsening.  On 7/18/17 GI attempted  ERCP, but could not get stone out. Was able to place stent though with only old bile noted behind the stone. he was started on nicardipine gtt for goal  to 140. Her GCS was 9 to 10, and she was intubated for airway protection prior to arrival at [facility]. She lives at [facility]. No family at bedside. Patient will be admitted to Neuro Critical Care for a higher level of care.

## 2017-07-19 NOTE — SUBJECTIVE & OBJECTIVE
(Not in a hospital admission)    Review of patient's allergies indicates:  No Known Allergies    Past Medical History:   Diagnosis Date    Asthma     Coronary artery disease     Depression     Emphysema, unspecified     GERD (gastroesophageal reflux disease)     Hypertension     Thyroid disease      History reviewed. No pertinent surgical history.  Family History     None        Social History Main Topics    Smoking status: Never Smoker    Smokeless tobacco: Never Used    Alcohol use No    Drug use: No    Sexual activity: No     Review of Systems   Unable to obtain 2/2 to intubation   Objective:     Weight: 51.9 kg (114 lb 6.7 oz)  Body mass index is 19.64 kg/m².  Vital Signs (Most Recent):  Temp: (!) 102.2 °F (39 °C) (17 1404)  Pulse: 69 (17 1732)  Resp: 14 (17 1350)  BP: 112/68 (17 1732)  SpO2: 100 % (17 1732) Vital Signs (24h Range):  Temp:  [98 °F (36.7 °C)-102.2 °F (39 °C)] 102.2 °F (39 °C)  Pulse:  [] 69  Resp:  [14-31] 14  SpO2:  [97 %-100 %] 100 %  BP: ()/() 112/68            Temp (24hrs), Av.9 °F (37.7 °C), Min:98 °F (36.7 °C), Max:102.2 °F (39 °C)    Vent Mode: A/C  Oxygen Concentration (%):  [] 40  Resp Rate Total:  [16 br/min-26 br/min] 21 br/min  Vt Set:  [400 mL] 400 mL  PEEP/CPAP:  [5 cmH20] 5 cmH20  Pressure Support:  [0 cmH20] 0 cmH20  Mean Airway Pressure:  [8 bmJ53-77 cmH20] 11 cmH20            Neurosurgery Physical Exam   Examined while intubated, sedated on propofol   E2VTM5  + corneal reflex b/l  + gag  Pupils 2mm, reactive  Localizes in RUE, LUE strength - 2/5  Withdraws briskly in B/L LEs  Skin is jaundice   No c/c/e    Significant Labs:    Recent Labs  Lab 17  2212 17  1449   GLU 98  --    * 134*   K 3.3*  --    CL 99  --    CO2 23  --    BUN 10  --    CREATININE 0.8  --    CALCIUM 9.1  --    MG 1.6  --        Recent Labs  Lab 17  0419 17  2977   WBC 8.50 12.77*   HGB 9.8* 10.6*   HCT 28.3*  Problem: Physical Therapy Goal  Goal: Physical Therapy Goal      No acute goals needed at this time.   Outcome: Outcome(s) achieved Date Met: 06/12/17        PT evaluation completed. Pt is supervision/mod (I) with all mobility and transfers. Pt does not demonstrate a need for skilled acute PT services. Eval/DC note to follow.     Anahy Saunders, PT, DPT  06/12/2017         31.6*   * 170       Recent Labs  Lab 07/18/17  0419  07/18/17  2212 07/19/17  1449   LABPT 13.5  --   --   --    INR 1.0  < > 1.0 1.0   APTT  --   --  25.3  --    < > = values in this interval not displayed.  Microbiology Results (last 7 days)     Procedure Component Value Units Date/Time    Blood culture [[id number]] Collected:  07/19/17 1505    Order Status:  Sent Specimen:  Blood from Peripheral, Hand, Left Updated:  07/19/17 1513    Blood culture [[id number]] Collected:  07/19/17 1455    Order Status:  Sent Specimen:  Blood from Peripheral, Forearm, Left Updated:  07/19/17 1509    Culture, Respiratory with Gram Stain [[id number]]     Order Status:  No result Specimen:  Respiratory             Significant Diagnostics:  CT: Ct Head Without Contrast    Result Date: 7/19/2017   There are CT findings of a large, acute intracranial hemorrhage involving the medial right temporal lobe with evidence of mass effect as described above. These findings were discussed with Dr. Galindo on 7/19/17 t 11:10 am   Electronically signed by: BENITO DELGADO MD Date: 07/19/17        Time:11:10

## 2017-07-19 NOTE — HOSPITAL COURSE
7/19 admitted to Neuro Critical Care as a transfer from [facility] s/p Right Temporal ICH 4.2 cm ; recent Biliary Stent 7/18 at outside hospital  Cultured today, consulted GI for repeat of ERCP; started abx, pending CTA  7/20: No issues. Bradycardic with Precedex.  Pending EEG report. CTA with no evidence of AVM. Gi consult. Repeat ERCP.   7/21: Pending ERCP. Pending. More awake and interactive. Following commands.  Pending PICC line placement. Na 138. Getting HTS 2% 300 cc PRN.  7/22: awake, interactive, poorly following commands. PICC in place. Na 142. Discontinue hypertonic. Send ammonia  7/23: ammonia trending down; not following commands; get EEG  7/24 Awaiting EEG, SBP parameters increased to <160, extubated  7/25 EEG today to eval aphasia, plan to transfer to Vascular Neurology

## 2017-07-19 NOTE — PT/OT/SLP PROGRESS
Occupational Therapy  Evaluation Attempt    Malina Robles  MRN: [id number]    Patient not seen today secondary to nsg hold at this time- pt requiring stat CT  . Will follow-up as available.    Vero Queen OT  7/19/2017

## 2017-07-19 NOTE — PROGRESS NOTES
Patient transferred from [facility]. Patient presented to the ER already intubated with a 7.5 ETT secured 22 @ lip and placed on charted settings.  Tolerated well with no adverse reactions.  Will monitor closely.

## 2017-07-19 NOTE — ASSESSMENT & PLAN NOTE
Transferred to ICU to start on nicardipine gtt. Will attempt to maintain SBP from 120 to 140 with the continuous gtt. She has not been on her prior clopidogrel or ASA for at least 6 days while in the other hospital prior to the ERCP. Intubated her for airway protection prior to transfer to [facility].

## 2017-07-19 NOTE — ASSESSMENT & PLAN NOTE
Right Temporal ICH   --Continue Neuro checks q 1hr  -- Neurosurgery consulted  -- Stroke Service consulted   -- CT Head 7/19 reveals there is a 4.2 cm hyperdense mass seen in the medial right temporal lobe with associated mass effect resulting in sulcal effacement of adjacent sulci.  -- SBP goal < 140  -- Na+ goal 145-150  -- 2% bolus for Na+ less than 145 300 q 6 hr PRN  -- Keppra load and 500 mg BID  --PT/OT/Speech  -- Pending repeat CTA   -- Pending further recommendations per NGSY

## 2017-07-19 NOTE — HPI
Malina Robles is a 57 y.o. female with hepatitis C, CAD, thyroid disease, and h/o CVA on ASA, plavix who presents as transfer from [facility], where she was admitted from [facility] for AMS and worsened jaundice. On admission 7/12, INR was 4.9, , . She was noted to be less responsive the morning of 7/19. She underwent CT head that showed acute ICH with IVH, and was transferred to [facility] [place] for higher level of care.

## 2017-07-19 NOTE — H&P
[facility]  Neurocritical Care  H&P Note    Admit Date: 7/19/2017  Service Date: 07/19/2017  Length of Stay: 0    Subjective:     Chief Complaint: Spontaneous intraparenchymal intracranial hemorrhage, acute    History of Present Illness: Ms. Malina Robles is 57 year old female with a PMHx of prior Stroke, Asthma, coronary artery disease (S/P Cardiac Surgery), Depression, Emphysema, GERD, Hypertension, thyroid disease, with recent Biliary Stent 7/18,  who presents to Neuro Critical Care as a transfer from [facility] s/p Right Temporal ICH. Per the chart,  she was admitted to [facility] from Nursing Home on 7/12/17 with altered mental status and increase jaundice upon admission PT-INR 4.9, , , Ammonia <9 on 7/12. She was on Plavix prior to admit for coronary artery disease which was upon admission.CT of abdomen on 7/13/17 showed 2cm stone distal common duct resulting in moderate biliary ductal dilatation and 3cm infrarenal abdominal aortic aneurysm. On 7/14 Urine culture positive for gram negative rods. She is being treated with IV Ceftriaxone.  7/15 liver were enzymes worsening.   On 7/18/17 GI attempted  ERCP, but could not get stone out. Was able to place stent though with only old bile noted behind the stone. he was started on nicardipine gtt for goal  to 140. Her GCS was 9 to 10, and she was intubated for airway protection prior to arrival at [facility]. She lives at [facility]. No family at bedside. Patient will be admitted to Neuro Critical Care for a higher level of care.            Hospital Course: 7/19 admitted to Neuro Critical Care as a transfer from [facility] s/p Right Temporal ICH 4.2 cm ; recent Biliary Stent 7/18 at outside hospital  Cultured today, consulted GI for repeat of ERCP; started abx, pending CTA      Interval History:  7/19 admitted to Neuro Critical Care as a transfer from [facility] s/p Right  Temporal ICH 4.2 cm ; recent Biliary Stent 7/18 at outside hospital  Cultured today, consulted GI for repeat of ERCP; started abx, pending CTA      Review of Systems   Unable to obtain a complete ROS due to level of consciousness.  Objective:     Vitals:  Temp: (!) 102.2 °F (39 °C) (07/19/17 1404)  Pulse: 75 (07/19/17 1702)  Resp: 14 (07/19/17 1350)  BP: 137/83 (07/19/17 1702)  SpO2: 100 % (07/19/17 1702)    Temp:  [98 °F (36.7 °C)-102.2 °F (39 °C)] 102.2 °F (39 °C)  Pulse:  [] 75  Resp:  [14-31] 14  SpO2:  [97 %-100 %] 100 %  BP: ()/() 137/83         Vent Mode: A/C  Oxygen Concentration (%):  [] 40  Resp Rate Total:  [16 br/min-23 br/min] 16 br/min  Vt Set:  [400 mL] 400 mL  PEEP/CPAP:  [5 cmH20] 5 cmH20  Pressure Support:  [0 cmH20] 0 cmH20  Mean Airway Pressure:  [8 cmH20-9.5 cmH20] 9.5 cmH20    No intake/output data recorded.    Physical Exam     Physical Exam:  GA: Intubated, Sedated Does not follow commands, Jaundiced, comfortable, no acute distress.   HEENT: + scleral icterus or JVD.   Pulmonary: Clear to auscultation Anterior. No wheezing, crackles, or rhonchi.  Cardiac: RRR S1 & S2 w/o rubs/murmurs/gallops.   Abdominal:  No appreciable hepatosplenomegaly.  Skin: + jaundice,  Neuro:  --GCS: E4 VT 5  --Mental Status:  Intubated, Sedated Does not follow commands, Jaundiced,  --CN II-XII unable to fully assess  --Pupils 4mm, PERRL.   --Corneal reflex, gag, cough intact.  --LUE strength: 3/5 weakness /contracted from prior stroke  --RUE strength: 4/5  --LLE strength: 4/5  --RLE strength: 4/5  Unable to test gait due to level of consciousness.    Medications:  Continuous  sodium chloride 0.9% Last Rate: 50 mL/hr at 07/19/17 1547   dexmedetomidine (PRECEDEX) infusion Last Rate: 1.4 mcg/kg/hr (07/19/17 1657)   Scheduled  chlorhexidine 15 mL BID   famotidine 20 mg BID   [START ON 7/20/2017] levetiracetam in NaCl (iso-os) 500 mg Q12H   levothyroxine 25 mcg Before breakfast    piperacillin-tazobactam 4.5 g in dextrose 5 % 100 mL IVPB (ready to mix system) 4.5 g Q8H   sodium chloride 0.9% 3 mL Q8H   vancomycin (VANCOCIN) IVPB 15 mg/kg Once   [START ON 7/20/2017] vancomycin (VANCOCIN) IVPB 750 mg Q12H   PRN   Today I personally reviewed pertinent medications, lines/drains/airways, lab results, notably:        Assessment/Plan:     Neuro   * Spontaneous intraparenchymal intracranial hemorrhage, acute    Right Temporal ICH   --Continue Neuro checks q 1hr  -- Neurosurgery consulted  -- Stroke Service consulted   -- CT Head 7/19 reveals there is a 4.2 cm hyperdense mass seen in the medial right temporal lobe with associated mass effect resulting in sulcal effacement of adjacent sulci.  -- SBP goal < 140  -- Na+ goal 145-150  -- 2% bolus for Na+ less than 145 300 q 6 hr PRN  -- Keppra load and 500 mg BID  --PT/OT/Speech  -- Pending repeat CTA   -- Pending further recommendations per NGSY              Chronic hepatitis C with hepatic coma    Chronic hep C with hepatic Coma  --see hepatic coma encephalopathy          Hepatic coma/encephalopathy    Hepatic coma/ecnephalopahty   Pt is encephalopathic          Cardiac   Essential hypertension    Essential hypertension  -- SBP goal less 140  -- 2D echo pending  -- EKG pending   -- CK MB and Troponin pending        Renal   E-coli UTI    E Coli UTI    Treat with 7 day course of Ceftriaxone ; Treatment began at outside hospital prior to arrival         Hem/Onc   Leukocytosis    -- Leukocytosis  -- Fever 102  -- Start empirical abx treatment with Vancomycin /Zosyn for 8 days on 7/19  -- Pan culture pending         Endocrine   Hypothyroid    Hypothyroid   --TSH 5.2  -- ordered free T4  -- Continue Synthroid  -- May likely need Endocrinology consult        Fluids/Electrolytes/Nutrition/GI   Calculus of bile duct without mention of cholecystitis, with obstruction    Calculus of bile duct without mention of cholecystitis with obstruction   Patient presented  to [facility] with AMS and increasing Jaundice on 7/12/17. Found to have 2cm stone distal common duct resulting in moderate biliary ductal dilatation.   --ERCP done at [place] and unable to remove stone, but biliary STENT placed.   --GI planned for ERCP with lithotripsy today, but that was placed on hold given the acute ICH.   -- Consulted GI at [facility] today 7/19 to re-evaluate repeat ERCP  -- Will restart Lactulose 10 mg BID  -- Ammonia level 43  -- Pending LFTs   -- Pending further recommendations per GI        Cholelithiasis    Cholelithiasis  -- See above  -- Consulted GI        Other   Elevated liver enzymes    Elevated liver enzymes  -- ; [id number]  -- See above           Jaundice    Jaundice  -- see above            Prophylaxis:  Venous Thromboembolism: mechanical  Stress Ulcer: PPI  Ventilator Pneumonia: yes     Activity Orders          None        Full Code    Christianne Marc PA-C  Neurocritical Care  [facility]

## 2017-07-19 NOTE — PROGRESS NOTES
[facility] Hospital Medicine  Progress Note    Patient Name: Malina Robles  MRN: [id number]  Patient Class: IP- Inpatient   Admission Date: 7/18/2017  Length of Stay: 1 days  Attending Physician: Darwin Galindo MD  Primary Care Provider: Jose Rios MD        Subjective:     Principal Problem:Spontaneous intraparenchymal intracranial hemorrhage, acute    HPI:  Malina Robles is 57 year old female with a PMHx of Asthma, coronary artery disease (S/P Cardiac Surgery), Depression, Emphysema, GERD, Hypertension, and thyroid disease. She lives at [facility].      She was admitted to [facility] from Nursing Home on 7/12/17 with altered mental status and increase jaundice. Nursing home staff stated that she has liver problems but was unsure of the exact diagnosis. They did note that she has increase jaundice over the past 3 days. Patient was awake but a very poor historians. Upon admission PT-INR 4.9, , , Ammonia <9. She was on Plavix prior to admit for coronary artery disease which was upon admission. CT of abdomen on 7/13/17 showed 2cm stone distal common duct resulting in moderate biliary ductal dilatation and 3cm infrarenal abdominal aortic aneurysm. On 7/14 Urine culture positive for gram negative rods. She is being treated with IV Ceftriaxone.  7/15 liver were enzymes worsening.  Today (7/18/17) GI attempted  ERCP , but could not get stone out. Was able to place stent though with only old bile noted behind the stone. No purulent material.  She is being transferred to [facility], admitted to [facility] with consult to GI for repeat ERCP and lithotripsy.    Upon my exam, the patient is awake and alert, but non-verbal. She follows commands and nods yes to every question asked.  She passed a bedside EDGARDO swallow test.  She is grossly jaundiced.  Her Ammonia today is 45, up from < 9 upon admission to [facility].  I will  restart lactulose.           Hospital Course:  Ms. Robles was noted to be less responsive the morning of 7/19. She underwent CT head that showed acute intraparenchymal hemorrhage. [facility] was contacted and spoke with Neurocritical Care MD. They accepted her in transfer and she will transfer to [facility] for continued care. She was started on nicardipine gtt for goal  to 140. Her GCS was 9 to 10, and she was intubated for airway protection.     The CBD duct stone issues will be placed on hold for now. GI will need to be consulted for this to evaluate after the ICH is addressed.     Interval History: Noted to have AMS this AM. Did not respond verbally or follow any commands. Did CT of the head that showed acute intraparenchymal hemorrhage in the temporal area. Spoke with [facility] and then with Vascular Neurology and [facility].    Review of Systems   Unable to perform ROS: Acuity of condition     Objective:     Vital Signs (Most Recent):  Temp: 100 °F (37.8 °C) (07/19/17 1215)  Pulse: 103 (07/19/17 1235)  Resp: (!) 29 (07/19/17 1235)  BP: 136/84 (07/19/17 1230)  SpO2: 100 % (07/19/17 1235) Vital Signs (24h Range):  Temp:  [97.4 °F (36.3 °C)-100.9 °F (38.3 °C)] 100 °F (37.8 °C)  Pulse:  [] 103  Resp:  [15-29] 29  SpO2:  [97 %-100 %] 100 %  BP: (130-190)/(63-98) 136/84     Weight: 51.9 kg (114 lb 6.7 oz)  Body mass index is 19.64 kg/m².    Intake/Output Summary (Last 24 hours) at 07/19/17 1251  Last data filed at 07/18/17 2258   Gross per 24 hour   Intake                0 ml   Output              300 ml   Net             -300 ml      Physical Exam   Constitutional: She appears well-developed.   Cardiovascular: Normal rate and regular rhythm.    No murmur heard.  Pulmonary/Chest: Effort normal and breath sounds normal. No respiratory distress. She has no wheezes.   Abdominal: Soft. Bowel sounds are normal. She exhibits no distension. There is no tenderness.   Musculoskeletal: She exhibits no edema.   Neurological:   Mental  status will respond to painful stimuli and localize pain. Opens eyes intermittently. Non-verbal currently.    Skin: Skin is warm and dry.   Nursing note and vitals reviewed.      Significant Labs:   CBC:   Recent Labs  Lab 07/18/17 0419 07/18/17 2212   WBC 8.50 12.77*   HGB 9.8* 10.6*   HCT 28.3* 31.6*   * 170     CMP:   Recent Labs  Lab 07/18/17 0419 07/18/17 2212    135*   K 3.1* 3.3*    99   CO2 21* 23   GLU 84 98   BUN 13 10   CREATININE 0.70 0.8   CALCIUM 8.6 9.1   PROT 6.6 6.9   ALBUMIN 3.0* 2.4*   BILITOT 14.4* 14.3*   ALKPHOS 484* 602*   * 215*   * 293*   ANIONGAP  --  13   EGFRNONAA >60 >60     Coagulation:   Recent Labs  Lab 07/18/17 2212   INR 1.0   APTT 25.3     Magnesium:   Recent Labs  Lab 07/18/17 2212   MG 1.6       Significant Imaging: CT: I have reviewed all pertinent results/findings within the past 24 hours and my personal findings are:  Acute intraparnchymal hemorrhage  I have reviewed all pertinent imaging results/findings within the past 24 hours.    Assessment/Plan:      * Spontaneous intraparenchymal intracranial hemorrhage, acute    Transferred to ICU to start on nicardipine gtt. Will attempt to maintain SBP from 120 to 140 with the continuous gtt. She has not been on her prior clopidogrel or ASA for at least 6 days while in the other hospital prior to the ERCP. Intubated her for airway protection prior to transfer to [facility].         Calculus of bile duct without mention of cholecystitis, with obstruction    Jaundice, Cholelithiasis, Elevated Liver Enzymes, Chronic Hepatitis C  Patient presented to [facility] with AMS and increasing Jaundice on 7/12/17. Found to have 2cm stone distal common duct resulting in moderate biliary ductal dilatation. ERCP done at [place] and unable to remove stone, but biliary STENT placed. GI planned for ERCP with lithotripsy today, but that was placed on hold given the acute ICH.         Hypothyroid    Continue  home levothyroxine 25 mcg daily. Will check TSH          E-coli UTI    Continue Cefrtriaxone 1 gm IV daily          Essential hypertension    Starting on nicardipine gtt for better control.             VTE Risk Mitigation         Ordered     Medium Risk of VTE  Once      07/18/17 2153     Place sequential compression device  Until discontinued      07/18/17 2153     Place WOJCIECH hose  Until discontinued      07/18/17 2153        Critical Care Time: 1 Hour 30 Minutes of critical care time was spent in the care of the patient. This included management of organ failures related to critical illness, titration of continuous infusions, review of pertinent labs and imaging studies, discussion of the patient with consulting services, and discussion of the patients condition with the patient and family.       Darwin Galindo MD  Department of Hospital Medicine   [facility]

## 2017-07-19 NOTE — ASSESSMENT & PLAN NOTE
-- Leukocytosis  -- Fever 102  -- Start empirical abx treatment with Vancomycin /Zosyn for 8 days on 7/19  -- Pan culture pending

## 2017-07-19 NOTE — PLAN OF CARE
Patient to be transferred to [facility] for acute bleed, currently intubated.     07/19/17 1253   Discharge Assessment   Assessment Type Discharge Planning Assessment   Assessment information obtained from? Medical Record   Expected Length of Stay (days) 1   Prior to hospitilization cognitive status: Unable to Assess   Prior to hospitalization functional status: Assistive Equipment;Needs Assistance   Current cognitive status: Unable to Assess   Current Functional Status: Assistive Equipment;Needs Assistance   Arrived From nursing home care facility   Lives With facility resident   Able to Return to Prior Arrangements unable to determine at this time (comments)   Is patient able to care for self after discharge? Unable to determine at this time (comments)   Who are your caregiver(s) and their phone number(s)? [facility]  [phone]   Readmission Within The Last 30 Days planned readmission   Patient currently being followed by outpatient case management? No   Patient currently receives home health services? No   Does the patient currently use HME? Yes   Equipment Currently Used at Home (per [facility])   Do you have any problems affording any of your prescribed medications? No   Is the patient taking medications as prescribed? yes   Do you have any financial concerns preventing you from receiving the healthcare you need? No   Does the patient have transportation to healthcare appointments? Yes   Transportation Available agency transportation   Discharge Plan A Other  (Transfer to [facility])   Patient/Family In Agreement With Plan unable to assess

## 2017-07-19 NOTE — H&P
[facility]  History & Physical    Patient Name: Malina Robles  MRN: [id number]  Admission Date: 7/18/2017  Attending Physician: Darwin Galindo MD   Primary Care Provider: Jose Rios MD         Patient information was obtained from past medical records and ER records.     Subjective:     Principal Problem:Calculus of bile duct without mention of cholecystitis, with obstruction    Chief Complaint: No chief complaint on file.       HPI: Malina Robles is 57 year old female with a PMHx of Asthma, coronary artery disease (S/P Cardiac Surgery), Depression, Emphysema, GERD, Hypertension, and thyroid disease. She lives at [facility].      She was admitted to [facility] from Nursing Home on 7/12/17 with altered mental status and increase jaundice. Nursing home staff stated that she has liver problems but was unsure of the exact diagnosis. They did note that she has increase jaundice over the past 3 days. Patient was awake but a very poor historians. Upon admission PT-INR 4.9, , , Ammonia <9. She was on Plavix prior to admit for coronary artery disease which was upon admission. CT of abdomen on 7/13/17 showed 2cm stone distal common duct resulting in moderate biliary ductal dilatation and 3cm infrarenal abdominal aortic aneurysm. On 7/14 Urine culture positive for gram negative rods. She is being treated with IV Ceftriaxone.  7/15 liver were enzymes worsening.  Today (7/18/17) GI attempted  ERCP , but could not get stone out. Was able to place stent though with only old bile noted behind the stone. No purulent material.  She is being transferred to [facility], admitted to [facility] with consult to GI for repeat ERCP and lithotripsy.    Upon my exam, the patient is awake and alert, but non-verbal. She follows commands and nods yes to every question asked.  She passed a bedside EDGARDO swallow test.  She is grossly  jaundiced.  Her Ammonia today is 45, up from < 9 upon admission to [facility].  I will restart lactulose.           Past Medical History:   Diagnosis Date    Asthma     Coronary artery disease     Depression     Emphysema, unspecified     GERD (gastroesophageal reflux disease)     Hypertension     Thyroid disease        History reviewed. No pertinent surgical history.    Review of patient's allergies indicates:  No Known Allergies    Current Facility-Administered Medications on File Prior to Encounter   Medication    [COMPLETED] potassium chloride 10 mEq in 100 mL IVPB    [DISCONTINUED] 0.9%  NaCl infusion    [DISCONTINUED] 0.9%  NaCl infusion    [DISCONTINUED] albuterol inhaler 1 puff    [DISCONTINUED] albuterol inhaler    [DISCONTINUED] amlodipine tablet 5 mg    [DISCONTINUED] cefTRIAXone (ROCEPHIN) 1 g in dextrose 5 % 50 mL IVPB    [DISCONTINUED] diphenhydrAMINE injection 25 mg    [DISCONTINUED] docusate sodium capsule 100 mg    [DISCONTINUED] ergocalciferol capsule 50,000 Units    [DISCONTINUED] fentaNYL injection    [DISCONTINUED] fluoxetine capsule 40 mg    [DISCONTINUED] folic acid-vit B6-vit B12 2.5-25-2 mg tablet 1 tablet    [DISCONTINUED] glycopyrrolate injection    [DISCONTINUED] hydrocodone-chlorpheniramine 10-8 mg/5 mL suspension 5 mL    [DISCONTINUED] HYDROmorphone injection 0.2 mg    [DISCONTINUED] iopamidol 76 % injection 100 mL    [DISCONTINUED] iothalamate meglumine 43 % injection 50 mL    [DISCONTINUED] isosorbide mononitrate 24 hr tablet 30 mg    [DISCONTINUED] lactated ringers infusion    [DISCONTINUED] levothyroxine tablet 25 mcg    [DISCONTINUED] lidocaine (PF) 10 mg/ml (1%) injection    [DISCONTINUED] lisinopril tablet 40 mg    [DISCONTINUED] meperidine (PF) injection 12.5 mg    [DISCONTINUED] morphine injection 2 mg    [DISCONTINUED] neostigmine methylsulfate injection    [DISCONTINUED] nitroGLYCERIN SL tablet 0.4 mg    [DISCONTINUED] omega-3 fatty  acids-fish oil 340-1,000 mg capsule 1 capsule    [DISCONTINUED] ondansetron injection 4 mg    [DISCONTINUED] ondansetron injection    [DISCONTINUED] pantoprazole EC tablet 40 mg    [DISCONTINUED] phenylephrine injection    [DISCONTINUED] promethazine (PHENERGAN) 12.5 mg in dextrose 5 % 50 mL IVPB    [DISCONTINUED] propofol (DIPRIVAN) 10 mg/mL infusion    [DISCONTINUED] rocuronium injection    [DISCONTINUED] sodium chloride 0.9% flush 3 mL    [DISCONTINUED] succinylcholine injection    [DISCONTINUED] tiotropium inhalation capsule 18 mcg    [DISCONTINUED] valproic acid (as sodium salt) 250 mg/5 mL (5 mL) syrup Soln 1,000 mg     Current Outpatient Prescriptions on File Prior to Encounter   Medication Sig    albuterol sulfate 90 mcg/actuation AePB Inhale 180 mcg into the lungs every 6 (six) hours as needed. Rescue    aspirin (ECOTRIN) 81 MG EC tablet Take 81 mg by mouth once daily.    atorvastatin (LIPITOR) 20 MG tablet Take 20 mg by mouth once daily.    clopidogrel (PLAVIX) 75 mg tablet Take 75 mg by mouth once daily.    docusate sodium (COLACE) 100 MG capsule Take 100 mg by mouth 2 (two) times daily.    ergocalciferol (VITAMIN D2) 50,000 unit Cap Take 50,000 Units by mouth every 7 days.    fish oil-omega-3 fatty acids 300-1,000 mg capsule Take 1 g by mouth 2 (two) times daily.     fluoxetine (PROZAC) 40 MG capsule Take 40 mg by mouth once daily.    IRON/FA/DHA/EPA/FAD/NADH/MV47 (ENLYTE, FERROUS GLYCINE, ORAL) Take 30 mg by mouth once daily at 6am.    isosorbide mononitrate (IMDUR) 30 MG 24 hr tablet Take 10 mg by mouth 2 (two) times daily.     lactulose (CEPHULAC) 10 gram packet Take 10 g by mouth 3 (three) times daily.    levothyroxine (SYNTHROID) 25 MCG tablet Take 25 mcg by mouth once daily.    lisinopril (PRINIVIL,ZESTRIL) 20 MG tablet Take 20 mg by mouth once daily.    nitroGLYCERIN (NITROSTAT) 0.4 MG SL tablet Place 0.4 mg under the tongue every 5 (five) minutes as needed.    olanzapine  (ZYPREXA) 20 MG tablet Take 7.5 mg by mouth every evening.     pantoprazole (PROTONIX) 40 MG tablet Take 40 mg by mouth once daily.    tiotropium (SPIRIVA) 18 mcg inhalation capsule Inhale 18 mcg into the lungs once daily.    tramadol (ULTRAM) 50 mg tablet Take 50 mg by mouth every 6 (six) hours as needed for Pain.    trazodone (DESYREL) 100 MG tablet Take 100 mg by mouth nightly as needed for Insomnia.    valproate (DEPAKENE) 250 mg/5 mL syrup Take 1,000 mg by mouth 2 (two) times daily.     Family History     None        Social History Main Topics    Smoking status: Never Smoker    Smokeless tobacco: Never Used    Alcohol use No    Drug use: No    Sexual activity: No     Review of Systems   Unable to perform ROS: Acuity of condition (Patient is non-verbal at this time. Unsure of Baseline)     Objective:     Vital Signs (Most Recent):  Temp: 98.6 °F (37 °C) (07/18/17 2200)  Pulse: 74 (07/19/17 0300)  Resp: 18 (07/18/17 2200)  BP: (!) 190/98 (07/18/17 2328)  SpO2: 99 % (07/18/17 2200) Vital Signs (24h Range):  Temp:  [97.4 °F (36.3 °C)-98.6 °F (37 °C)] 98.6 °F (37 °C)  Pulse:  [57-82] 74  Resp:  [15-18] 18  SpO2:  [96 %-100 %] 99 %  BP: (130-190)/(63-98) 190/98        There is no height or weight on file to calculate BMI.    Physical Exam   Constitutional: She is oriented to person, place, and time. She appears well-developed. No distress.   HENT:   Head: Normocephalic and atraumatic.   Mouth/Throat: Oropharynx is clear and moist. No oropharyngeal exudate.   Eyes: Pupils are equal, round, and reactive to light. Scleral icterus is present.   Neck: Neck supple.   Cardiovascular: Normal rate, regular rhythm, normal heart sounds and intact distal pulses.  Exam reveals no gallop and no friction rub.    No murmur heard.  Pulmonary/Chest: Effort normal and breath sounds normal. No respiratory distress. She has no wheezes. She has no rales.   Abdominal: Soft. Bowel sounds are normal. She exhibits distension (Mild).  There is tenderness. There is no rebound and no guarding.   Musculoskeletal: She exhibits no edema, tenderness or deformity.   Neurological: She is alert and oriented to person, place, and time. She exhibits normal muscle tone.   Skin: Skin is warm and dry. Capillary refill takes 2 to 3 seconds. No rash noted. She is not diaphoretic.   Jaundiced   Psychiatric: Her affect is inappropriate. She is withdrawn. Cognition and memory are impaired.   Nonverbal   Nursing note and vitals reviewed.       Significant Labs:   CBC:   Recent Labs  Lab 07/18/17 0419 07/18/17 2212   WBC 8.50 12.77*   HGB 9.8* 10.6*   HCT 28.3* 31.6*   * 170     CMP:   Recent Labs  Lab 07/18/17 0419 07/18/17 2212    135*   K 3.1* 3.3*    99   CO2 21* 23   GLU 84 98   BUN 13 10   CREATININE 0.70 0.8   CALCIUM 8.6 9.1   PROT 6.6 6.9   ALBUMIN 3.0* 2.4*   BILITOT 14.4* 14.3*   ALKPHOS 484* 602*   * 215*   * 293*   ANIONGAP  --  13   EGFRNONAA >60 >60     Coagulation:   Recent Labs  Lab 07/18/17 2212   INR 1.0   APTT 25.3      Ref Range & Units 07/18/17 2212   Valproic Acid Lvl 50.0 - 100.0 ug/mL 27.5    Comments: Valproic Acid (ug/ml)   Toxic:   >100.0 ug/ml      Ammonia   Status:  Final result    Ref Range & Units 07/18/17 2212   Ammonia 10 - 50 umol/L 45   Resulting Agency  [facility]        Specimen Collected: 07/18/17 2212 Last Resulted: 07/18/17 2302             Significant Imaging: I have reviewed all pertinent imaging results/findings within the past 24 hours.   Imaging from [facility]:  Chest X-Ray  Impression      No evidence of an acute pulmonary process.  ET tube in place.      Electronically signed by: JESS FERNANDEZ MD  Date: 07/18/17  Time: 15:39      FL ERCP  Narrative     Fluoroscopic images obtained during ERCP    History: Bile duct calculus    Findings: Intraoperative fluoroscopic images obtained during ERCP demonstrate intra-and extrahepatic biliary ductal dilatation with subsequent placement of a  biliary stent.   Impression      Biliary stent placement.      Electronically signed by: DARIN ZAZUETA MD  Date: 07/18/17  Time: 15:15      Abdominal CT  Impression       1.  2 cm stone distal common duct resulting in moderate biliary ductal dilatation.  2.  3 cm infrarenal abdominal aortic aneurysm.  3.  cortical scarring right kidney.      Electronically signed by: NITA SALGADO MD  Date: 07/13/17  Time: 08:53          Assessment/Plan:     * Calculus of bile duct without mention of cholecystitis, with obstruction    Jaundice, Cholelithiasis, Elevated Liver Enzymes, Chronic Hepatitis C, Encephalopathy  Patient presented to [facility] with AMS and increasing Jaundice on 7/12/17. Found to have 2cm stone distal common duct resulting in moderate biliary ductal dilatation. ERCP done at [place] and unable to remove stone, but biliary STENT placed. Patient is currently very jaundiced and has altered mental status.   Alk Phos 602 (prio 484)  Tbili 14.3 (prior 140.4)   (prior 372)   (prior 346)  Ammonia 45 (prio < 8)  --GI Consult for ERCP and Lithotripsy.   --Keep NPO  --IV FLuids  --Restart Lactulose TID          Hypothyroid    Continue home levothyroxine 25 mcg daily. Will check TSH          E-coli UTI    Urine culture from 7/12/17 grew E-Coli:   7d ago   Urine Culture, Routine  ESCHERICHIA COLI   10,000 - 49,999 cfu/ml      --Continue Cefrtriaxone 1 gm IV daily          Essential hypertension    -190  Continue home isosorbide mononitrate 30 mg daily, lisinopril 20 mg daily            VTE Risk Mitigation         Ordered     Medium Risk of VTE  Once      07/18/17 2153     Place sequential compression device  Until discontinued      07/18/17 2153     Place WOJCIECH hose  Until discontinued      07/18/17 2153        Reny Huitron NP  Department of Hospital Medicine   [facility]

## 2017-07-19 NOTE — CONSULTS
Spoke with DEVENDRA Gray to inform her patient will not be able to have PICC placement today due to blood cultures that were drawn today, will have to have 48 hours of negative blood cultures before placing PICC line. RN stated she would let the team know.

## 2017-07-19 NOTE — PT/OT/SLP PROGRESS
Occupational Therapy  Eval D/c Orders    Malina Robles  MRN: [id number]    Patient not seen today secondary to t/f to ICU 2/2 change in status  . Will d/c OT orders at this time.     Vero Queen, OT  7/19/2017

## 2017-07-19 NOTE — ASSESSMENT & PLAN NOTE
56 yo F with non traumatic right temporal ICH  - Admit to neuro ICU q 1 hour neuro checks  - CTA Head is pending to r/o vascular etiology  - No acute neurosurgical interventions   - Strict BP control, SBP < 160  - Keppra for Sz prophylaxis   - Will continue to follow, please call with questions  - Discussed with Dr. Aggarwal

## 2017-07-19 NOTE — HOSPITAL COURSE
Ms. Robles was noted to be less responsive the morning of 7/19. She underwent CT head that showed acute intraparenchymal hemorrhage. [facility] was contacted and spoke with Neurocritical Care MD. They accepted her in transfer and she will transfer to [facility] ED for continued care. She was started on nicardipine gtt for goal  to 140. Her GCS was 9 to 10, and she was intubated for airway protection.     The CBD duct stone issues will be placed on hold for now. GI will need to be consulted for this to evaluate after the ICH is addressed.

## 2017-07-19 NOTE — PLAN OF CARE
"Problem: Patient Care Overview  Goal: Plan of Care Review  Outcome: Ongoing (interventions implemented as appropriate)  Patient resting throughout shift. Respirations even and unlabored. Skin warm,dry, intact and normal tone of ethnicity. Vital signs stable. Patient mostly nonverbal but able to state her first name and nods "yes" to every other question.Telemetry monitor on and audible displaying no true red alarms. All safety measures maintained. Will continue to monitor.    Problem: Pressure Ulcer Risk (Naseem Scale) (Adult,Obstetrics,Pediatric)  Goal: Identify Related Risk Factors and Signs and Symptoms  Related risk factors and signs and symptoms are identified upon initiation of Human Response Clinical Practice Guideline (CPG)   Outcome: Ongoing (interventions implemented as appropriate)  Patient turned every two hours throughout shift. Positioning supports utilized to turn and elevate extremities      "

## 2017-07-20 PROBLEM — G93.6 VASOGENIC CEREBRAL EDEMA: Status: ACTIVE | Noted: 2017-07-20

## 2017-07-20 PROBLEM — I61.2: Status: ACTIVE | Noted: 2017-07-19

## 2017-07-20 LAB
ALBUMIN SERPL BCP-MCNC: 2.1 G/DL
ALBUMIN SERPL BCP-MCNC: 2.2 G/DL
ALLENS TEST: ABNORMAL
ALP SERPL-CCNC: 460 U/L
ALP SERPL-CCNC: 462 U/L
ALT SERPL W/O P-5'-P-CCNC: 166 U/L
ALT SERPL W/O P-5'-P-CCNC: 171 U/L
AMYLASE SERPL-CCNC: 47 U/L
ANION GAP SERPL CALC-SCNC: 10 MMOL/L
ANION GAP SERPL CALC-SCNC: 11 MMOL/L
AST SERPL-CCNC: 76 U/L
AST SERPL-CCNC: 78 U/L
BASOPHILS # BLD AUTO: 0.04 K/UL
BASOPHILS # BLD AUTO: 0.05 K/UL
BASOPHILS NFR BLD: 0.3 %
BASOPHILS NFR BLD: 0.4 %
BILIRUB SERPL-MCNC: 7.7 MG/DL
BILIRUB SERPL-MCNC: 7.8 MG/DL
BUN SERPL-MCNC: 10 MG/DL
BUN SERPL-MCNC: 9 MG/DL
CALCIUM SERPL-MCNC: 8.8 MG/DL
CALCIUM SERPL-MCNC: 8.9 MG/DL
CHLORIDE SERPL-SCNC: 100 MMOL/L
CHLORIDE SERPL-SCNC: 100 MMOL/L
CO2 SERPL-SCNC: 24 MMOL/L
CO2 SERPL-SCNC: 24 MMOL/L
CREAT SERPL-MCNC: 0.8 MG/DL
CREAT SERPL-MCNC: 0.8 MG/DL
DELSYS: ABNORMAL
DIASTOLIC DYSFUNCTION: NO
DIFFERENTIAL METHOD: ABNORMAL
DIFFERENTIAL METHOD: ABNORMAL
EOSINOPHIL # BLD AUTO: 0.3 K/UL
EOSINOPHIL # BLD AUTO: 0.3 K/UL
EOSINOPHIL NFR BLD: 2.5 %
EOSINOPHIL NFR BLD: 2.7 %
ERYTHROCYTE [DISTWIDTH] IN BLOOD BY AUTOMATED COUNT: 14.8 %
ERYTHROCYTE [DISTWIDTH] IN BLOOD BY AUTOMATED COUNT: 15.2 %
ERYTHROCYTE [SEDIMENTATION RATE] IN BLOOD BY WESTERGREN METHOD: 14 MM/H
EST. GFR  (AFRICAN AMERICAN): >60 ML/MIN/1.73 M^2
EST. GFR  (AFRICAN AMERICAN): >60 ML/MIN/1.73 M^2
EST. GFR  (NON AFRICAN AMERICAN): >60 ML/MIN/1.73 M^2
EST. GFR  (NON AFRICAN AMERICAN): >60 ML/MIN/1.73 M^2
ESTIMATED AVG GLUCOSE: 85 MG/DL
ESTIMATED PA SYSTOLIC PRESSURE: 24.8
FIO2: 40
GLUCOSE SERPL-MCNC: 106 MG/DL
GLUCOSE SERPL-MCNC: 158 MG/DL
HBA1C MFR BLD HPLC: 4.6 %
HCO3 UR-SCNC: 26.9 MMOL/L (ref 24–28)
HCT VFR BLD AUTO: 27 %
HCT VFR BLD AUTO: 27.4 %
HGB BLD-MCNC: 9.3 G/DL
HGB BLD-MCNC: 9.5 G/DL
INR PPP: 1.1
LACTATE SERPL-SCNC: 1.6 MMOL/L
LIPASE SERPL-CCNC: 33 U/L
LYMPHOCYTES # BLD AUTO: 3.1 K/UL
LYMPHOCYTES # BLD AUTO: 4 K/UL
LYMPHOCYTES NFR BLD: 26.6 %
LYMPHOCYTES NFR BLD: 34.5 %
MAGNESIUM SERPL-MCNC: 1.3 MG/DL
MAGNESIUM SERPL-MCNC: 1.7 MG/DL
MCH RBC QN AUTO: 30.7 PG
MCH RBC QN AUTO: 31.3 PG
MCHC RBC AUTO-ENTMCNC: 34.4 G/DL
MCHC RBC AUTO-ENTMCNC: 34.7 G/DL
MCV RBC AUTO: 89 FL
MCV RBC AUTO: 90 FL
MODE: ABNORMAL
MONOCYTES # BLD AUTO: 0.8 K/UL
MONOCYTES # BLD AUTO: 1.1 K/UL
MONOCYTES NFR BLD: 6.9 %
MONOCYTES NFR BLD: 9.1 %
NEUTROPHILS # BLD AUTO: 6.3 K/UL
NEUTROPHILS # BLD AUTO: 7 K/UL
NEUTROPHILS NFR BLD: 54.3 %
NEUTROPHILS NFR BLD: 59.9 %
PCO2 BLDA: 26.9 MMHG (ref 35–45)
PEEP: 5
PH SMN: 7.61 [PH] (ref 7.35–7.45)
PHOSPHATE SERPL-MCNC: 3.1 MG/DL
PLATELET # BLD AUTO: 154 K/UL
PLATELET # BLD AUTO: 181 K/UL
PMV BLD AUTO: 10 FL
PMV BLD AUTO: 10.2 FL
PO2 BLDA: 144 MMHG (ref 80–100)
POC BE: 5 MMOL/L
POC SATURATED O2: 100 % (ref 95–100)
POC TCO2: 28 MMOL/L (ref 23–27)
POCT GLUCOSE: 101 MG/DL (ref 70–110)
POCT GLUCOSE: 83 MG/DL (ref 70–110)
POTASSIUM SERPL-SCNC: 2.8 MMOL/L
POTASSIUM SERPL-SCNC: 2.9 MMOL/L
POTASSIUM SERPL-SCNC: 2.9 MMOL/L
POTASSIUM SERPL-SCNC: 4.3 MMOL/L
PROT SERPL-MCNC: 6.1 G/DL
PROT SERPL-MCNC: 6.1 G/DL
PROTHROMBIN TIME: 11.5 SEC
RBC # BLD AUTO: 3.03 M/UL
RBC # BLD AUTO: 3.04 M/UL
RETIRED EF AND QEF - SEE NOTES: 60 (ref 55–65)
SAMPLE: ABNORMAL
SITE: ABNORMAL
SODIUM SERPL-SCNC: 134 MMOL/L
SODIUM SERPL-SCNC: 134 MMOL/L
SODIUM SERPL-SCNC: 135 MMOL/L
SODIUM SERPL-SCNC: 135 MMOL/L
SODIUM SERPL-SCNC: 136 MMOL/L
SODIUM SERPL-SCNC: 136 MMOL/L
SP02: 100
TRICUSPID VALVE REGURGITATION: NORMAL
TROPONIN I SERPL DL<=0.01 NG/ML-MCNC: <0.006 NG/ML
VT: 500
WBC # BLD AUTO: 11.64 K/UL
WBC # BLD AUTO: 11.67 K/UL

## 2017-07-20 PROCEDURE — 94003 VENT MGMT INPAT SUBQ DAY: CPT

## 2017-07-20 PROCEDURE — G8978 MOBILITY CURRENT STATUS: HCPCS | Mod: CN

## 2017-07-20 PROCEDURE — 83605 ASSAY OF LACTIC ACID: CPT

## 2017-07-20 PROCEDURE — 83690 ASSAY OF LIPASE: CPT

## 2017-07-20 PROCEDURE — G8979 MOBILITY GOAL STATUS: HCPCS | Mod: CL

## 2017-07-20 PROCEDURE — 93306 TTE W/DOPPLER COMPLETE: CPT

## 2017-07-20 PROCEDURE — 84484 ASSAY OF TROPONIN QUANT: CPT

## 2017-07-20 PROCEDURE — 97110 THERAPEUTIC EXERCISES: CPT

## 2017-07-20 PROCEDURE — 80053 COMPREHEN METABOLIC PANEL: CPT

## 2017-07-20 PROCEDURE — 97163 PT EVAL HIGH COMPLEX 45 MIN: CPT

## 2017-07-20 PROCEDURE — 99291 CRITICAL CARE FIRST HOUR: CPT | Mod: ,,, | Performed by: PSYCHIATRY & NEUROLOGY

## 2017-07-20 PROCEDURE — 37799 UNLISTED PX VASCULAR SURGERY: CPT

## 2017-07-20 PROCEDURE — 25000003 PHARM REV CODE 250: Performed by: PHYSICIAN ASSISTANT

## 2017-07-20 PROCEDURE — 99900035 HC TECH TIME PER 15 MIN (STAT)

## 2017-07-20 PROCEDURE — 82150 ASSAY OF AMYLASE: CPT

## 2017-07-20 PROCEDURE — 25000003 PHARM REV CODE 250: Performed by: NURSE PRACTITIONER

## 2017-07-20 PROCEDURE — 99900026 HC AIRWAY MAINTENANCE (STAT)

## 2017-07-20 PROCEDURE — 63600175 PHARM REV CODE 636 W HCPCS

## 2017-07-20 PROCEDURE — 63600175 PHARM REV CODE 636 W HCPCS: Performed by: NURSE PRACTITIONER

## 2017-07-20 PROCEDURE — 84132 ASSAY OF SERUM POTASSIUM: CPT

## 2017-07-20 PROCEDURE — 93306 TTE W/DOPPLER COMPLETE: CPT | Mod: 26,,, | Performed by: INTERNAL MEDICINE

## 2017-07-20 PROCEDURE — 85610 PROTHROMBIN TIME: CPT

## 2017-07-20 PROCEDURE — 99233 SBSQ HOSP IP/OBS HIGH 50: CPT | Mod: ,,, | Performed by: PSYCHIATRY & NEUROLOGY

## 2017-07-20 PROCEDURE — 83735 ASSAY OF MAGNESIUM: CPT | Mod: 91

## 2017-07-20 PROCEDURE — 97802 MEDICAL NUTRITION INDIV IN: CPT

## 2017-07-20 PROCEDURE — 94760 N-INVAS EAR/PLS OXIMETRY 1: CPT

## 2017-07-20 PROCEDURE — 20000000 HC ICU ROOM

## 2017-07-20 PROCEDURE — 27200966 HC CLOSED SUCTION SYSTEM

## 2017-07-20 PROCEDURE — 84100 ASSAY OF PHOSPHORUS: CPT

## 2017-07-20 PROCEDURE — 63600175 PHARM REV CODE 636 W HCPCS: Performed by: PHYSICIAN ASSISTANT

## 2017-07-20 PROCEDURE — 83735 ASSAY OF MAGNESIUM: CPT

## 2017-07-20 PROCEDURE — 95951 PR EEG MONITORING/VIDEORECORD: CPT | Mod: 26,52,, | Performed by: PSYCHIATRY & NEUROLOGY

## 2017-07-20 PROCEDURE — 84132 ASSAY OF SERUM POTASSIUM: CPT | Mod: 91

## 2017-07-20 PROCEDURE — 93010 ELECTROCARDIOGRAM REPORT: CPT | Mod: ,,, | Performed by: INTERNAL MEDICINE

## 2017-07-20 PROCEDURE — 84295 ASSAY OF SERUM SODIUM: CPT | Mod: 91

## 2017-07-20 PROCEDURE — 36415 COLL VENOUS BLD VENIPUNCTURE: CPT

## 2017-07-20 PROCEDURE — 27000221 HC OXYGEN, UP TO 24 HOURS

## 2017-07-20 PROCEDURE — 85025 COMPLETE CBC W/AUTO DIFF WBC: CPT | Mod: 91

## 2017-07-20 PROCEDURE — 82803 BLOOD GASES ANY COMBINATION: CPT

## 2017-07-20 RX ORDER — LANOLIN ALCOHOL/MO/W.PET/CERES
800 CREAM (GRAM) TOPICAL
Status: DISCONTINUED | OUTPATIENT
Start: 2017-07-20 | End: 2017-07-21

## 2017-07-20 RX ORDER — FENTANYL CITRATE 50 UG/ML
25 INJECTION, SOLUTION INTRAMUSCULAR; INTRAVENOUS
Status: DISCONTINUED | OUTPATIENT
Start: 2017-07-20 | End: 2017-07-24

## 2017-07-20 RX ORDER — SODIUM,POTASSIUM PHOSPHATES 280-250MG
2 POWDER IN PACKET (EA) ORAL
Status: DISCONTINUED | OUTPATIENT
Start: 2017-07-20 | End: 2017-07-21

## 2017-07-20 RX ORDER — POTASSIUM CHLORIDE 7.45 MG/ML
10 INJECTION INTRAVENOUS
Status: COMPLETED | OUTPATIENT
Start: 2017-07-20 | End: 2017-07-20

## 2017-07-20 RX ORDER — FENTANYL CITRATE 50 UG/ML
INJECTION, SOLUTION INTRAMUSCULAR; INTRAVENOUS
Status: COMPLETED
Start: 2017-07-20 | End: 2017-07-20

## 2017-07-20 RX ORDER — AMLODIPINE BESYLATE 5 MG/1
5 TABLET ORAL DAILY
Status: DISCONTINUED | OUTPATIENT
Start: 2017-07-21 | End: 2017-07-24

## 2017-07-20 RX ORDER — POTASSIUM CHLORIDE 20 MEQ/15ML
40 SOLUTION ORAL
Status: DISCONTINUED | OUTPATIENT
Start: 2017-07-20 | End: 2017-07-21

## 2017-07-20 RX ORDER — POTASSIUM CHLORIDE 20 MEQ/15ML
60 SOLUTION ORAL
Status: DISCONTINUED | OUTPATIENT
Start: 2017-07-20 | End: 2017-07-21

## 2017-07-20 RX ORDER — AMLODIPINE BESYLATE 5 MG/1
5 TABLET ORAL DAILY
Status: DISCONTINUED | OUTPATIENT
Start: 2017-07-20 | End: 2017-07-20

## 2017-07-20 RX ADMIN — PIPERACILLIN AND TAZOBACTAM 4.5 G: 4; .5 INJECTION, POWDER, LYOPHILIZED, FOR SOLUTION INTRAVENOUS; PARENTERAL at 06:07

## 2017-07-20 RX ADMIN — FAMOTIDINE 20 MG: 20 TABLET, FILM COATED ORAL at 09:07

## 2017-07-20 RX ADMIN — SODIUM CHLORIDE: 234 INJECTION INTRAMUSCULAR; INTRAVENOUS; SUBCUTANEOUS at 03:07

## 2017-07-20 RX ADMIN — SODIUM CHLORIDE: 0.9 INJECTION, SOLUTION INTRAVENOUS at 08:07

## 2017-07-20 RX ADMIN — Medication 800 MG: at 10:07

## 2017-07-20 RX ADMIN — CHLORHEXIDINE GLUCONATE 15 ML: 1.2 RINSE ORAL at 09:07

## 2017-07-20 RX ADMIN — FENTANYL CITRATE 25 MCG: 50 INJECTION INTRAMUSCULAR; INTRAVENOUS at 08:07

## 2017-07-20 RX ADMIN — NICARDIPINE HYDROCHLORIDE 2.5 MG/HR: 0.2 INJECTION, SOLUTION INTRAVENOUS at 06:07

## 2017-07-20 RX ADMIN — Medication 800 MG: at 06:07

## 2017-07-20 RX ADMIN — AMLODIPINE BESYLATE 5 MG: 5 TABLET ORAL at 11:07

## 2017-07-20 RX ADMIN — POTASSIUM CHLORIDE 60 MEQ: 20 SOLUTION ORAL at 12:07

## 2017-07-20 RX ADMIN — FENTANYL CITRATE 25 MCG: 50 INJECTION INTRAMUSCULAR; INTRAVENOUS at 04:07

## 2017-07-20 RX ADMIN — Medication 800 MG: at 01:07

## 2017-07-20 RX ADMIN — SODIUM CHLORIDE: 234 INJECTION INTRAMUSCULAR; INTRAVENOUS; SUBCUTANEOUS at 09:07

## 2017-07-20 RX ADMIN — VANCOMYCIN HYDROCHLORIDE 750 MG: 750 INJECTION, POWDER, LYOPHILIZED, FOR SOLUTION INTRAVENOUS at 04:07

## 2017-07-20 RX ADMIN — POTASSIUM CHLORIDE 10 MEQ: 10 INJECTION, SOLUTION INTRAVENOUS at 03:07

## 2017-07-20 RX ADMIN — POTASSIUM CHLORIDE 10 MEQ: 10 INJECTION, SOLUTION INTRAVENOUS at 04:07

## 2017-07-20 RX ADMIN — Medication 3 ML: at 06:07

## 2017-07-20 RX ADMIN — FENTANYL CITRATE 25 MCG: 50 INJECTION INTRAMUSCULAR; INTRAVENOUS at 12:07

## 2017-07-20 RX ADMIN — LEVETIRACETAM 500 MG: 5 INJECTION INTRAVENOUS at 09:07

## 2017-07-20 RX ADMIN — PIPERACILLIN AND TAZOBACTAM 4.5 G: 4; .5 INJECTION, POWDER, LYOPHILIZED, FOR SOLUTION INTRAVENOUS; PARENTERAL at 04:07

## 2017-07-20 RX ADMIN — LEVETIRACETAM 500 MG: 5 INJECTION INTRAVENOUS at 03:07

## 2017-07-20 RX ADMIN — LEVOTHYROXINE SODIUM 25 MCG: 25 TABLET ORAL at 06:07

## 2017-07-20 RX ADMIN — POTASSIUM CHLORIDE 10 MEQ: 10 INJECTION, SOLUTION INTRAVENOUS at 05:07

## 2017-07-20 RX ADMIN — LACTULOSE 10 G: 20 SOLUTION ORAL at 06:07

## 2017-07-20 RX ADMIN — LACTULOSE 10 G: 20 SOLUTION ORAL at 09:07

## 2017-07-20 RX ADMIN — Medication 3 ML: at 09:07

## 2017-07-20 RX ADMIN — VANCOMYCIN HYDROCHLORIDE 750 MG: 750 INJECTION, POWDER, LYOPHILIZED, FOR SOLUTION INTRAVENOUS at 06:07

## 2017-07-20 RX ADMIN — LACTULOSE 10 G: 20 SOLUTION ORAL at 02:07

## 2017-07-20 RX ADMIN — POTASSIUM CHLORIDE 60 MEQ: 20 SOLUTION ORAL at 10:07

## 2017-07-20 RX ADMIN — Medication 3 ML: at 01:07

## 2017-07-20 NOTE — PROGRESS NOTES
[id number] patient transported from ED 2 to CT scan via transport ventilator with oxygen. Ambu bag and mask at [facility]. Patient tolerated well no complications noted. Patient placed back ventilator upon arrival back to ED 2 as documented.

## 2017-07-20 NOTE — PROGRESS NOTES
2nd floor CT able to take pt per GA Flores, CT contacted, stated they can no longer take pt within the next 30 minutes and will contact back with specific time.  Will continue to monitor.

## 2017-07-20 NOTE — PROCEDURES
Malina Robles is a 57 y.o. female patient.    Temp: (!) 100.7 °F (38.2 °C) (07/19/17 1910)  Pulse: (!) 52 (07/19/17 2108)  Resp: 14 (07/19/17 2108)  BP: (!) 167/92 (07/19/17 2047)  SpO2: 100 % (07/19/17 2108)  Weight: 51.9 kg (114 lb 6.7 oz) (07/19/17 1501)       Arterial Line  Date/Time: 7/19/2017 9:38 PM  Performed by: ESTUARDO MACKAY  Authorized by: ESTUARDO MACKAY   Consent Done: Emergent Situation  Preparation: Patient was prepped and draped in the usual sterile fashion.  Indications: multiple ABGs, respiratory failure and hemodynamic monitoring  Location: left radial  Patient sedated: yes  Analgesia: fentanyl (25mcg, precedex infusion already running )  Terrance's test normal: yes  Needle gauge: 20  Number of attempts: 1  Complications: No  Post-procedure: dressing applied  Post-procedure CMS: unchanged and normal  Patient tolerance: Patient tolerated the procedure well with no immediate complications          Estuardo Mackay  7/19/2017

## 2017-07-20 NOTE — ASSESSMENT & PLAN NOTE
57 y.o. female PMH hepatitis C with transaminitis, CAD, thyroid disease, and h/o CVA on ASA, plavix. CT with R temporal ICH    Antiplatelets: None - hemorrhage  Statins: None - hemorrhage  SBP < 140  DVT ppx: TEDs, SCDs  PT/OT and speech to evaluate and treat  Neuro checks qh

## 2017-07-20 NOTE — SUBJECTIVE & OBJECTIVE
Past Medical History:   Diagnosis Date    Asthma     Coronary artery disease     Depression     Emphysema, unspecified     GERD (gastroesophageal reflux disease)     Hypertension     Thyroid disease        History reviewed. No pertinent surgical history.    Review of patient's allergies indicates:  No Known Allergies  Family History     None        Social History Main Topics    Smoking status: Never Smoker    Smokeless tobacco: Never Used    Alcohol use No    Drug use: No    Sexual activity: No     Review of Systems   Unable to perform ROS: Intubated     Objective:     Vital Signs (Most Recent):  Temp: 99.6 °F (37.6 °C) (07/20/17 1100)  Pulse: 60 (07/20/17 1315)  Resp: (!) 23 (07/20/17 1200)  BP: 125/80 (07/20/17 1200)  SpO2: 100 % (07/20/17 1315) Vital Signs (24h Range):  Temp:  [97.9 °F (36.6 °C)-102.2 °F (39 °C)] 99.6 °F (37.6 °C)  Pulse:  [39-79] 60  Resp:  [12-32] 23  SpO2:  [99 %-100 %] 100 %  BP: ()/(56-94) 125/80  Arterial Line BP: ()/(50-95) 104/60     Weight: 54 kg (119 lb 0.8 oz) (07/20/17 0140)  Body mass index is 20.43 kg/m².      Intake/Output Summary (Last 24 hours) at 07/20/17 1322  Last data filed at 07/20/17 1200   Gross per 24 hour   Intake          4486.25 ml   Output             1240 ml   Net          3246.25 ml       Lines/Drains/Airways     Drain                 NG/OG Tube 07/19/17 1412 orogastric 18 Fr. Right mouth less than 1 day         Urethral Catheter 07/19/17 1409 Double-lumen 16 Fr. less than 1 day          Airway                 Airway - Non-Surgical 07/19/17 1232 Endotracheal Tube 1 day          Arterial Line                 Arterial Line 07/19/17 2125 Left Radial less than 1 day          Peripheral Intravenous Line                 Peripheral IV - Single Lumen 07/18/17 0545 Right Forearm 2 days         Peripheral IV - Single Lumen 07/19/17 1606 Right Antecubital less than 1 day         Peripheral IV - Single Lumen 07/19/17 1606 Right Hand less than 1 day          Peripheral IV - Single Lumen 07/19/17 1822 Left Forearm less than 1 day                Physical Exam   Constitutional:   Frail, ill appearing female   HENT:   Head: Normocephalic and atraumatic.   Eyes: Scleral icterus is present.   Neck: Normal range of motion. Neck supple.   Cardiovascular: Normal rate and regular rhythm.  Exam reveals no friction rub.    No murmur heard.  Pulmonary/Chest:   Intubated    Abdominal: Soft. Bowel sounds are normal. She exhibits no distension.   Neurological:   Intubated and sedated    Skin: Skin is warm and dry.   Jaundice    Psychiatric: She has a normal mood and affect. Her behavior is normal.       Significant Labs:  All pertinent lab results from the last 24 hours have been reviewed.    Significant Imaging:  Imaging results within the past 24 hours have been reviewed.

## 2017-07-20 NOTE — PHYSICIAN QUERY
PT Name: Malina Robles  MR #: [id number]     Physician Query Form - Medication-Correlation for Diagnosis      CDS/: ALIZE Stevens RN               Contact information: [phone]    This form is a permanent document in the medical record.     Query Date: July 20, 2017      By submitting this query, we are merely seeking further clarification of documentation.  Please utilize your independent clinical judgment when addressing the question(s) below.    The medical record contains the following:  The patient has an order for the following medication(s):    potassium chloride 10 mEq in 100 mL IVPB - MAR 7/20    Potassium = 2.8 on labs 7/20           Please provider the corresponding diagnosis or diagnoses that support(s) the use of the medication(s)   Physician Use Only    [  x  ]  Hypokalemia    [     ]  Other

## 2017-07-20 NOTE — ASSESSMENT & PLAN NOTE
Jaundice, Cholelithiasis, Elevated Liver Enzymes, Chronic Hepatitis C  Patient presented to [facility] with AMS and increasing Jaundice on 7/12/17. Found to have 2cm stone distal common duct resulting in moderate biliary ductal dilatation. ERCP done at [place] and unable to remove stone, but biliary STENT placed. GI planned for ERCP with lithotripsy today, but that was placed on hold given the acute ICH.

## 2017-07-20 NOTE — CONSULTS
"  [facility]  Adult Nutrition  Consult Note    SUMMARY     Recommendations    Recommendation/Intervention:   1. If unable to extubate >48hrs, recommend starting TF.   Isosource 1.5 @ goal rate 40mL/hr.   -Initiate @ 10mL/hr and increase by 10mL every 2-4hrs, or as tolerated, until goal rate is reached.   -Hold for residuals >500mL.     2. If able to extubate and advance diet, recommend Regular with texture per SLP recommendations.     RD to monitor.      Goals: Pt to receive nutrition by RD follow up   Nutrition Goal Status: new  Communication of RD Recs: reviewed with RN    Reason for Assessment    Reason for Assessment: physician consult  Diagnosis: hemorrhage  Relevent Medical History: stroke, CAD, depression, GERD, HTN, thyroid disease         General Information Comments: Pt intubated. No family at bedside.     Nutrition Discharge Planning: unable to determine at this time    Nutrition Prescription Ordered    Current Diet Order: NPO     Evaluation of Received Nutrients/Fluid Intake        IV Fluid (mL): 1200     % Intake of Estimated Energy Needs: 0 - 25 %  % Meal Intake: NPO     Nutrition Risk Screen     Nutrition Risk Screen: no indicators present    Nutrition/Diet History       Typical Food/Fluid Intake: KATHY. Pt NPO on [facility] vent.  Food Preferences: KATHY Jain/cultural preferences at this time.        Factors Affecting Nutritional Intake: NPO                Labs/Tests/Procedures/Meds       Pertinent Labs Reviewed: reviewed  Pertinent Labs Comments: Na 135, Mg 1.3, POCT Glu 104  Pertinent Medications Reviewed: reviewed  Pertinent Medications Comments: lactulose, IVF, cardene    Physical Findings    Overall Physical Appearance: nourished, on ventilator support  Tubes: orogastric tube     Skin: intact    Anthropometrics    Temp: 99.6 °F (37.6 °C)     Height: 5' 4" (162.6 cm)  Weight Method: Estimated  Weight: 54 kg (119 lb 0.8 oz)     Ideal Body Weight (IBW), Female: 120 lb     % Ideal " Body Weight, Female (lb): 99.21 lb  BMI (Calculated): 20.5  BMI Grade: 18.5-24.9 - normal        Estimated/Assessed Needs    Weight Used For Calorie Calculations: 54 kg (119 lb 0.8 oz)      Energy Calorie Requirements (kcal): 1564  Energy Need Method: [place]        RMR (Bell-St. Jeor Equation): 1120        Weight Used For Protein Calculations: 54 kg (119 lb 0.8 oz)  Protein Requirements: 65-81g (1.2-1.5g/kg)  Fluid Requirements (mL): 1ml/kcal or per MD  RDA Method (mL): 1564               Assessment and Plan    Nutrition Problem  Inadequate energy intake    Related to (etiology):   Inability to consume sufficient energy    Signs and Symptoms (as evidenced by):   NPO, no alternative means of nutrition.     Interventions/Recommendations (treatment strategy):  See RD recs above.    Nutrition Diagnosis Status:   New        Monitor and Evaluation    Food and Nutrient Intake: energy intake, food and beverage intake, enteral nutrition intake  Food and Nutrient Adminstration: diet order, enteral and parenteral nutrition administration        Anthropometric Measurements: weight, weight change, body mass index  Biochemical Data, Medical Tests and Procedures: electrolyte and renal panel, gastrointestinal profile, glucose/endocrine profile, inflammatory profile, lipid profile  Nutrition-Focused Physical Findings: overall appearance    Nutrition Risk    Level of Risk: other (see comments) (f/u 2x/week)    Nutrition Follow-Up    RD Follow-up?: Yes

## 2017-07-20 NOTE — PT/OT/SLP EVAL
Speech Language Pathology  Discharge      Malina Robles  MRN: [id number]    Patient not seen today secondary to Other (Currently intubated). Please re-consult SLP services when pt extubated and appropriate for treatment.     MERY Thomas, CCC-SLP

## 2017-07-20 NOTE — DISCHARGE SUMMARY
[facility]  Discharge Summary      Patient Name: Malina Robles  MRN: [id number]  Admission Date: 7/18/2017  Hospital Length of Stay: 1 days  Discharge Date and Time: 7/19/2017  1:30 PM  Attending Physician: Darwin Galindo MD   Discharging Provider: Darwin Galindo MD  Primary Care Provider: Jose Rios MD      HPI:   Malina Robles is 57 year old female with a PMHx of Asthma, coronary artery disease (S/P Cardiac Surgery), Depression, Emphysema, GERD, Hypertension, and thyroid disease. She lives at [facility].      She was admitted to [facility] from [facility] Home on 7/12/17 with altered mental status and increase jaundice. Nursing home staff stated that she has liver problems but was unsure of the exact diagnosis. They did note that she has increase jaundice over the past 3 days. Patient was awake but a very poor historians. Upon admission PT-INR 4.9, , , Ammonia <9. She was on Plavix prior to admit for coronary artery disease which was upon admission. CT of abdomen on 7/13/17 showed 2cm stone distal common duct resulting in moderate biliary ductal dilatation and 3cm infrarenal abdominal aortic aneurysm. On 7/14 Urine culture positive for gram negative rods. She is being treated with IV Ceftriaxone.  7/15 liver were enzymes worsening.  Today (7/18/17) GI attempted  ERCP , but could not get stone out. Was able to place stent though with only old bile noted behind the stone. No purulent material.  She is being transferred to [facility], admitted to [facility] with consult to GI for repeat ERCP and lithotripsy.    Upon my exam, the patient is awake and alert, but non-verbal. She follows commands and nods yes to every question asked.  She passed a bedside EDGARDO swallow test.  She is grossly jaundiced.  Her Ammonia today is 45, up from < 9 upon admission to [facility].  I will restart lactulose.            Procedure(s) (LRB):  ERCP (N/A)      Indwelling Lines/Drains at time of discharge:   Lines/Drains/Airways     Drain                 NG/OG Tube 07/19/17 1412 orogastric 18 Fr. Right mouth less than 1 day         Urethral Catheter 07/19/17 1409 Double-lumen 16 Fr. less than 1 day          Airway                 Airway - Non-Surgical 07/19/17 1232 Endotracheal Tube less than 1 day          Arterial Line                 Arterial Line 07/19/17 2125 Left Radial less than 1 day              Hospital Course:   Ms. Robles was noted to be less responsive the morning of 7/19. She underwent CT head that showed acute intraparenchymal hemorrhage. [facility] was contacted and spoke with Neurocritical Care MD. They accepted her in transfer and she will transfer to [facility] for continued care. She was started on nicardipine gtt for goal  to 140. Her GCS was 9 to 10, and she was intubated for airway protection.     The CBD duct stone issues will be placed on hold for now. GI will need to be consulted for this to evaluate after the ICH is addressed.      Consults:   Consults         Status Ordering Provider     Gastroenterology  Once     Provider:  Santy Villalpando MD    Completed ELSY KO          Significant Diagnostic Studies: Labs:   CMP   Recent Labs  Lab 07/18/17  0419 07/18/17  2212 07/19/17  1449 07/19/17  1455 07/19/17  1827    135* 134*  --  133*   K 3.1* 3.3*  --   --   --     99  --   --   --    CO2 21* 23  --   --   --    GLU 84 98  --   --   --    BUN 13 10  --   --   --    CREATININE 0.70 0.8  --   --   --    CALCIUM 8.6 9.1  --   --   --    PROT 6.6 6.9  --  7.3  --    ALBUMIN 3.0* 2.4*  --  2.5*  --    BILITOT 14.4* 14.3*  --  9.9*  --    ALKPHOS 484* 602*  --  589*  --    * 215*  --  132*  --    * 293*  --  244*  --    ANIONGAP  --  13  --   --   --    ESTGFRAFRICA >60 >60  --   --   --    EGFRNONAA >60 >60  --   --   --    , CBC   Recent Labs  Lab 07/18/17  0417  07/18/17  2212   WBC 8.50 12.77*   HGB 9.8* 10.6*   HCT 28.3* 31.6*   * 170    and INR   Lab Results   Component Value Date    INR 1.0 07/19/2017    INR 1.0 07/18/2017    INR 1.0 07/18/2017     Radiology: CT Head: There is a 4.2 cm hyperdense mass seen in the medial right temporal lobe with associated mass effect resulting in sulcal effacement of adjacent sulci.  There is no evidence significant midline shift.  There is effacement of the right basilar cistern suggesting possible progression of herniation of the medial right temporal lobe.    Pending Diagnostic Studies:     None        Final Active Diagnoses:    Diagnosis Date Noted POA    PRINCIPAL PROBLEM:  Spontaneous intraparenchymal intracranial hemorrhage, acute [I62.9] 07/19/2017 No    Calculus of bile duct without mention of cholecystitis, with obstruction [K80.51] 07/13/2017 Yes    Chronic hepatitis C with hepatic coma [B18.2] 07/19/2017 Yes    Hypothyroid [E03.9] 07/18/2017 Yes    E-coli UTI [N39.0, B96.20] 07/14/2017 Yes    Hepatic coma/encephalopathy [K72.91] 07/14/2017 Yes    Cholelithiasis [K80.20] 07/13/2017 Yes    Elevated liver enzymes [R74.8] 07/13/2017 Yes    Essential hypertension [I10] 07/13/2017 Yes    Jaundice [R17] 07/12/2017 Yes      Problems Resolved During this Admission:    Diagnosis Date Noted Date Resolved POA      * Spontaneous intraparenchymal intracranial hemorrhage, acute    Transferred to ICU to start on nicardipine gtt. Goal SBP from 120 to 140 with the continuous gtt. She has not been on her prior clopidogrel or ASA for at least 6 days while in the other hospital prior to the ERCP. Intubated her for airway protection prior to transfer to [facility] for a higher level of care and NSGY consultation.         Calculus of bile duct without mention of cholecystitis, with obstruction    Jaundice, Cholelithiasis, Elevated Liver Enzymes, Chronic Hepatitis C  Patient presented to [facility] with AMS and increasing  Jaundice on 7/12/17. Found to have 2cm stone distal common duct resulting in moderate biliary ductal dilatation. ERCP done at [facility] and unable to remove stone, but biliary STENT placed. GI planned for ERCP with lithotripsy today, but that was placed on hold given the acute ICH.             Discharged Condition: critical    Disposition: Discharged to Other Faci*    Follow Up:    Patient Instructions:   No discharge procedures on file.  Medications:  Transfer Medications (for Discharge Readmit only):   No current facility-administered medications for this encounter.      Current Outpatient Prescriptions   Medication Sig Dispense Refill    albuterol sulfate 90 mcg/actuation AePB Inhale 180 mcg into the lungs every 6 (six) hours as needed. Rescue      aspirin (ECOTRIN) 81 MG EC tablet Take 81 mg by mouth once daily.      atorvastatin (LIPITOR) 20 MG tablet Take 20 mg by mouth once daily.      clopidogrel (PLAVIX) 75 mg tablet Take 75 mg by mouth once daily.      docusate sodium (COLACE) 100 MG capsule Take 100 mg by mouth 2 (two) times daily.      ergocalciferol (VITAMIN D2) 50,000 unit Cap Take 50,000 Units by mouth every 7 days.      fish oil-omega-3 fatty acids 300-1,000 mg capsule Take 1 g by mouth 2 (two) times daily.       fluoxetine (PROZAC) 40 MG capsule Take 40 mg by mouth once daily.      IRON/FA/DHA/EPA/FAD/NADH/MV47 (ENLYTE, FERROUS GLYCINE, ORAL) Take 30 mg by mouth once daily at 6am.      isosorbide mononitrate (IMDUR) 30 MG 24 hr tablet Take 10 mg by mouth 2 (two) times daily.       lactulose (CEPHULAC) 10 gram packet Take 10 g by mouth 3 (three) times daily.      levothyroxine (SYNTHROID) 25 MCG tablet Take 25 mcg by mouth once daily.      lisinopril (PRINIVIL,ZESTRIL) 20 MG tablet Take 20 mg by mouth once daily.      nitroGLYCERIN (NITROSTAT) 0.4 MG SL tablet Place 0.4 mg under the tongue every 5 (five) minutes as needed.      olanzapine (ZYPREXA) 20 MG tablet Take 7.5 mg by mouth  every evening.       pantoprazole (PROTONIX) 40 MG tablet Take 40 mg by mouth once daily.      tiotropium (SPIRIVA) 18 mcg inhalation capsule Inhale 18 mcg into the lungs once daily.      tramadol (ULTRAM) 50 mg tablet Take 50 mg by mouth every 6 (six) hours as needed for Pain.      trazodone (DESYREL) 100 MG tablet Take 100 mg by mouth nightly as needed for Insomnia.      valproate (DEPAKENE) 250 mg/5 mL syrup Take 1,000 mg by mouth 2 (two) times daily.       Facility-Administered Medications Ordered in Other Encounters   Medication Dose Route Frequency Provider Last Rate Last Dose    0.9%  NaCl infusion   Intravenous Continuous GERTRUDE Solano 50 mL/hr at 07/19/17 1547      chlorhexidine 0.12 % solution 15 mL  15 mL Mouth/Throat BID GERTRUDE Solano   15 mL at 07/19/17 2017    dexmedetomidine (PRECEDEX) 400mcg/100mL 0.9% NaCL infusion  0.2 mcg/kg/hr Intravenous Continuous GERTRUDE Solano 15.6 mL/hr at 07/19/17 2257 1.2 mcg/kg/hr at 07/19/17 2257    famotidine tablet 20 mg  20 mg Per NG tube BID GERTRUDE Solano   20 mg at 07/19/17 2017    lactulose 20 gram/30 mL solution Soln 10 g  10 g Per NG tube TID GERTRUDE Solano   10 g at 07/19/17 1833    [START ON 7/20/2017] levetiracetam in NaCl (iso-os) IVPB 500 mg  500 mg Intravenous Q12H GERTRUDE Solano        levothyroxine tablet 25 mcg  25 mcg Per OG tube Before breakfast GERTRUDE Solano   25 mcg at 07/19/17 1607    niCARdipine 40 mg/200 mL infusion  2.5 mg/hr Intravenous Continuous Mitchell Rosario NP   Stopped at 07/19/17 2145    piperacillin-tazobactam 4.5 g in dextrose 5 % 100 mL IVPB (ready to mix system)  4.5 g Intravenous Q8H GERTRUDE Solano 25 mL/hr at 07/19/17 2008 4.5 g at 07/19/17 2008    sodium acetate 86 mEq, sodium chloride (23.4%) 4 mEq/mL 86 mEq in sterile water 500 mL (buffered sodium 2%) infusion   Intravenous Q6H PRN Christianne Marc,  PA        sodium chloride 0.9% flush 3 mL  3 mL Intravenous Q8H GERTRUDE Solano        [START ON 7/20/2017] vancomycin (VANCOCIN) 750 mg in dextrose 5 % 250 mL IVPB  750 mg Intravenous Q12H GERTRUDE Solano         Time spent on the discharge of patient: 35 minutes    Darwin Galindo MD  Department of Hospital Medicine  [facility]

## 2017-07-20 NOTE — PROGRESS NOTES
All 3 Pyxis out of Mag Oxide, pharmacy notified and having PRN dose sent up.  Will continue to monitor.

## 2017-07-20 NOTE — ASSESSMENT & PLAN NOTE
- Stroke risk factor.  - Management per primary team.  - SBP<140 in setting of bleed.  - on cardene

## 2017-07-20 NOTE — PHYSICIAN QUERY
PT Name: Malina Robles  MR #: [id number]    Physician Query Form -Systemic Infectious Process Clarification     CDS/: ALIZE Stevens RN              Contact information: [phone]  This form is a permanent document in the medical record.     Query Date: July 20, 2017     By submitting this query, we are merely seeking further clarification of documentation. Please utilize your independent clinical judgment when addressing the question(s) below.    The Medical record contains the following:     Indicators   Supporting Clinical Findings   Location in Medical Record   X HR RR BP Temp Pulse:  [] 75     Resp:  [14-31] 14     Temp:  [98 °F (36.7 °C)-102.2 °F (39 °C)] 102.2 °F (39 °C)    H & P 7/19   X Lactic Acid             Procalcitonin Lactate = 1.6 Labs 7/20   X WBC                Bands                     CRP WBC = 11.67  Labs 7/20    Culture(s)     X AMS, Confusion, LOC, etc. · Patient presented to [facility] with AMS   H & P 7/19   X Organ Dysfunction / Failure Chronic hepatitis C with hepatic coma  H & P 7/19   X Bacteremia or Sepsis / Septic     X Known or Suspected Source of Infection documented E-coli UTI  H & P 7/19    (Failed) Outpatient Treatment     X Medication Treat with 7 day course of Ceftriaxone     -- Fever 102   Start empirical abx treatment with Vancomycin /Zosyn for 8 days on 7/19  H & P 7/19    Treatment      Other       Provider, please specify diagnosis or diagnoses associated with above clinical findings.    [  ] Sepsis  [  ] Severe Sepsis with Acute Organ Dysfunction/Failure (please specify organ                      dysfunction/failure):_________________  [  ] Sepsis with Septic Shock  [  ] Other Infectious Disease (please specify): _________________________________  [x  ] Other: ____hepatic encephalopathy______________________________  [  ] Clinically Undetermined    Please document in your progress notes daily for the duration of treatment until resolved and  include in your discharge summary.

## 2017-07-20 NOTE — SUBJECTIVE & OBJECTIVE
Neurologic Chief Complaint: R temporal ICH    Subjective:     Interval History: Patient is seen for follow-up neurological assessment and treatment recommendations: NAEON, remains intubated, not sedated    HPI, Past Medical, Family, and Social History remains the same as documented in the initial encounter.     Review of Systems   Constitutional: Positive for fever.   Respiratory:        Intubated   Cardiovascular: Negative for leg swelling.   Gastrointestinal: Negative for vomiting.   Skin: Positive for color change.   Neurological: Positive for speech difficulty.   Psychiatric/Behavioral: Negative for agitation.     Scheduled Meds:   amlodipine  5 mg Oral Daily    chlorhexidine  15 mL Mouth/Throat BID    famotidine  20 mg Per NG tube BID    lactulose  10 g Per NG tube TID    levetiracetam in NaCl (iso-os)  500 mg Intravenous Q12H    levothyroxine  25 mcg Per OG tube Before breakfast    piperacillin-tazobactam 4.5 g in dextrose 5 % 100 mL IVPB (ready to mix system)  4.5 g Intravenous Q8H    sodium chloride 0.9%  500 mL Intravenous Once    sodium chloride 0.9%  3 mL Intravenous Q8H    vancomycin (VANCOCIN) IVPB  750 mg Intravenous Q12H     Continuous Infusions:   sodium chloride 0.9% 50 mL/hr at 07/20/17 1000    nicardipine 2.5 mg/hr (07/20/17 1000)     PRN Meds:fentaNYL, magnesium oxide, magnesium oxide, potassium chloride 10%, potassium chloride 10%, potassium chloride 10%, potassium, sodium phosphates, potassium, sodium phosphates, potassium, sodium phosphates, buffered 2% sodium acetate 86meq, sodium chloride 86meq, sterile water for inj IV soln    Objective:     Vital Signs (Most Recent):  Temp: 98.3 °F (36.8 °C) (07/20/17 0701)  Pulse: (!) 57 (07/20/17 1000)  Resp: 12 (07/20/17 1000)  BP: 119/74 (07/20/17 1000)  SpO2: 100 % (07/20/17 1000)       Vital Signs Range (Last 24H):  Temp:  [97.9 °F (36.6 °C)-102.2 °F (39 °C)]   Pulse:  []   Resp:  [12-32]   BP: ()/()   SpO2:  [99 %-100  %]   Arterial Line BP: ()/(50-95)        Physical Exam   Constitutional: She appears well-developed and well-nourished. No distress.   HENT:   Head: Normocephalic and atraumatic.   Eyes: Pupils are equal, round, and reactive to light.   Scleral icterus   Cardiovascular: Bradycardia present.    Pulmonary/Chest:   intubated   Abdominal: Soft.   Skin: Skin is warm and dry.   Jaundice skin       Neurological Exam:   LOC: drowsy, off sedation, intubated  Language: Intubated  Speech: Intubated  Orientation: Intubated  Pupils (CN III, IV, VI): PERRL  Motor*: moves all extremities spontaneously with equal strength, strength 3/5 in all 4 limbs    NIH Stroke Scale:    Level of Consciousness: 1 - drowsy  LOC Questions: 2 - answers none correctly  LOC Commands: 2 - performs neither correctly  Best Gaze: 0 - normal  Visual: 0 - no visual loss  Facial Palsy: 0 - normal  Motor Left Arm: 2 - can't resist gravity  Motor Right Arm: 2 - can't resist gravity  Motor Left Le - can't resist gravity  Motor Right Le - can't resist gravity  Limb Ataxia: 0 - absent  Sensory: 0 - normal  Best Language: 3 - mute  Dysarthria: UN - intubated or other barrier  Extinction and Inattention: 0 - no neglect  NIH Stroke Scale Total: 16      Laboratory:  CMP:   Recent Labs  Lab 17  0613 17  0815   CALCIUM 8.8  --   --    ALBUMIN 2.1*  --   --    PROT 6.1  --   --    * 134*  --    K 2.9*  --  2.9*   CO2 24  --   --      --   --    BUN 10  --   --    CREATININE 0.8  --   --    ALKPHOS 460*  --   --    *  --   --    AST 76*  --   --    BILITOT 7.7*  --   --      CBC:   Recent Labs  Lab 17   WBC 11.64   RBC 3.03*   HGB 9.3*   HCT 27.0*      MCV 89   MCH 30.7   MCHC 34.4     Lipid Panel:   Recent Labs  Lab 17  1449   CHOL 420*   LDLCALC 342.0*   HDL 11*   TRIG 335*     Coagulation:   Recent Labs  Lab 17  2212  17  0223   INR 1.0  < > 1.1   APTT 25.3  --   --    <  > = values in this interval not displayed.  Platelet Aggregation Study: No results for input(s): PLTAGG, PLTAGINTERP, PLTAGREGLACO, ADPPLTAGGREG in the last 168 hours.  Hgb A1C:   Recent Labs  Lab 07/19/17  1449   HGBA1C 4.6     TSH:   Recent Labs  Lab 07/19/17  1449   TSH 3.018       Diagnostic Results:  I have personally reviewed:   Findings:     CT Head. Date: 07/19/17  There are CT findings of a large, acute intracranial hemorrhage involving the medial right temporal lobe with evidence of mass effect as described above.    CTA Head/Neck. Date: 07/19/17  Acute right temporal intraparenchymal hemorrhage grossly stable to the prior exam with persistent edema and localized mass effect. No significant hydrocephalus or midline shift.    Scattered atherosclerosis of the bilateral ICAs resulting in 50% stenosis of the right cavernous ICA. No large vessel occlusion or aneurysm identified.    Age advanced senescent changes.    Remote right basal ganglia and left cerebral and cerebellar infarcts, unchanged from examination performed earlier today.

## 2017-07-20 NOTE — CONSULTS
[facility]  Gastroenterology  Consult Note    Patient Name: Malina Robles  MRN: [id number]  Admission Date: 7/19/2017  Hospital Length of Stay: 0 days  Code Status: Full Code   Attending Provider: No att. providers found   Consulting Provider: Jorge Luis Birch MD  Primary Care Physician: Jose Rios MD  Principal Problem:Nontraumatic intracerebral hemorrhage in hemisphere, unspecified    Consults  Subjective:     HPI:  57 year old female with a PMHx of prior Stroke, Asthma, coronary artery disease (S/P Cardiac Surgery), Depression, Emphysema, GERD, Hypertension, thyroid disease, with recent Biliary Stent 7/18, who presents to Neuro Critical Care as a transfer from [facility] s/ Right Temporal [facility]. Per the chart,  she was admitted to [facility] from Nursing Home on 7/12/17 with altered mental status and increase jaundice upon admission PT-INR 4.9, , , Ammonia <9 on 7/12. She was on Plavix prior to admit for coronary artery disease which was upon admission. CT of abdomen on 7/13/17 showed 2cm stone distal common duct resulting in moderate biliary ductal dilatation and 3cm infrarenal abdominal aortic aneurysm. On 7/14 Urine culture positive for gram negative rods. She is being treated with IV Ceftriaxone.  7/15 liver were enzymes worsening.   On 7/18/17 GI attempted ERCP, but could not get stone out. Was able to place stent though with only old bile noted behind the stone. he was started on nicardipine gtt for goal  to 140. Her GCS was 9 to 10, and she was intubated for airway protection prior to arrival at [facility]. She lives at [facility]. No family at bedside. Patient will be admitted to Neuro Critical Care for a higher level of care.        Hospital Course: 7/19 admitted to Neuro Critical Care as a transfer from [facility] s/ Right Temporal ICH 4.2 cm ; recent Biliary Stent 7/18 at outside hospital. We are cnnsulted  for repeat of ERCP; started abx, pending CTA    Past Medical History:   Diagnosis Date    Asthma     Coronary artery disease     Depression     Emphysema, unspecified     GERD (gastroesophageal reflux disease)     Hypertension     Thyroid disease        History reviewed. No pertinent surgical history.    Review of patient's allergies indicates:  No Known Allergies  Family History     None        Social History Main Topics    Smoking status: Never Smoker    Smokeless tobacco: Never Used    Alcohol use No    Drug use: No    Sexual activity: No     Review of Systems   Unable to perform ROS: Intubated     Objective:     Vital Signs (Most Recent):  Temp: 99.6 °F (37.6 °C) (07/20/17 1100)  Pulse: 60 (07/20/17 1315)  Resp: (!) 23 (07/20/17 1200)  BP: 125/80 (07/20/17 1200)  SpO2: 100 % (07/20/17 1315) Vital Signs (24h Range):  Temp:  [97.9 °F (36.6 °C)-102.2 °F (39 °C)] 99.6 °F (37.6 °C)  Pulse:  [39-79] 60  Resp:  [12-32] 23  SpO2:  [99 %-100 %] 100 %  BP: ()/(56-94) 125/80  Arterial Line BP: ()/(50-95) 104/60     Weight: 54 kg (119 lb 0.8 oz) (07/20/17 0140)  Body mass index is 20.43 kg/m².      Intake/Output Summary (Last 24 hours) at 07/20/17 1322  Last data filed at 07/20/17 1200   Gross per 24 hour   Intake          4486.25 ml   Output             1240 ml   Net          3246.25 ml       Lines/Drains/Airways     Drain                 NG/OG Tube 07/19/17 1412 orogastric 18 Fr. Right mouth less than 1 day         Urethral Catheter 07/19/17 1409 Double-lumen 16 Fr. less than 1 day          Airway                 Airway - Non-Surgical 07/19/17 1232 Endotracheal Tube 1 day          Arterial Line                 Arterial Line 07/19/17 2125 Left Radial less than 1 day          Peripheral Intravenous Line                 Peripheral IV - Single Lumen 07/18/17 0545 Right Forearm 2 days         Peripheral IV - Single Lumen 07/19/17 1606 Right Antecubital less than 1 day         Peripheral IV - Single  Lumen 07/19/17 1606 Right Hand less than 1 day         Peripheral IV - Single Lumen 07/19/17 1822 Left Forearm less than 1 day                Physical Exam   Constitutional:   Frail, ill appearing female   HENT:   Head: Normocephalic and atraumatic.   Eyes: Scleral icterus is present.   Neck: Normal range of motion. Neck supple.   Cardiovascular: Normal rate and regular rhythm.  Exam reveals no friction rub.    No murmur heard.  Pulmonary/Chest:   Intubated    Abdominal: Soft. Bowel sounds are normal. She exhibits no distension.   Neurological:   Intubated and sedated    Skin: Skin is warm and dry.   Jaundice    Psychiatric: She has a normal mood and affect. Her behavior is normal.       Significant Labs:  All pertinent lab results from the last 24 hours have been reviewed.    Significant Imaging:  Imaging results within the past 24 hours have been reviewed.    Assessment/Plan:     Calculus of bile duct without mention of cholecystitis, with obstruction    57 year old female with a PMHx of prior Stroke, Asthma, coronary artery disease (S/P Cardiac Surgery), Depression, Emphysema, GERD, Hypertension, thyroid disease, with recent Biliary Stent 7/18, who presents to Neuro Critical Care as a transfer from [facility] s/p Right Temporal [facility]. she was admitted to [facility] from Nursing Home on 7/12/17 with altered mental status and increase jaundice upon admission PT-INR 4.9, , , Ammonia <9 on 7/12. She was on Plavix prior to admit for coronary artery disease which was upon admission. CT of abdomen on 7/13/17 showed 2cm stone distal common duct resulting in moderate biliary ductal dilatation and 3cm infrarenal abdominal aortic aneurysm. On 7/14 Urine culture positive for gram negative rods. She is being treated with IV Ceftriaxone.  7/15 liver were enzymes worsening but improving after ERCP. On 7/18/17 GI attempted ERCP, but could not get stone out. Was able to place stent though with only  old bile noted behind the stone.      7/19 admitted to Neuro Critical Care as a transfer from [facility] s/p Right Temporal ICH 4.2 cm ;Cultured today, we are cOnsulted for repeat of ERCP; started abx, pending CTA    - Patient will certainly warrant repeat ERCP at some point for stone removal, however she does have a biliary stent in place with trending down bilirubin so this will likely be delayed until all of her acute issues are improving and stable          Thank you for your consult. I will sign off. Please contact us if you have any additional questions.    Jorge Luis Birch MD  Gastroenterology  [facility]

## 2017-07-20 NOTE — PLAN OF CARE
Problem: Patient Care Overview  Goal: Plan of Care Review  Outcome: Ongoing (interventions implemented as appropriate)  Nutrition assessment completed. Please see RD note for details.    Recommendation/Intervention:   1. If unable to extubate >48hrs, recommend starting TF.   Isosource 1.5 @ goal rate 40mL/hr.   -Initiate @ 10mL/hr and increase by 10mL every 2-4hrs, or as tolerated, until goal rate is reached.   -Hold for residuals >500mL.      2. If able to extubate and advance diet, recommend Regular with texture per SLP recommendations.      RD to monitor.

## 2017-07-20 NOTE — CONSULTS
[facility]  Neurosurgery  Consult Note    Consults  Subjective:     Chief Complaint/Reason for Admission: ICH    History of Present Illness: Mrs.. Robles is a 57 y.o.  F with PMHx of HTN, CAD, hypothyroidism, and Hep C with with elevated liver enzymes.  Transferred from [facility] today for evaluation of ICH.   Pt was admitted for Liver issues and hyperbilirubinemia.  She was found to have a common bile duct stone and was scheduled for ERCP today, she has change in MS today, HCT revealed right temporal ICH.  She was intubated upon arrival at ED.  No family present during examination.  History obtained from chart.  Intitla presenting INR on  was 4.9, today INR is 1.0. Pt on aspirin and plavix at home, has reportedly been held x 7 days.          (Not in a hospital admission)    Review of patient's allergies indicates:  No Known Allergies    Past Medical History:   Diagnosis Date    Asthma     Coronary artery disease     Depression     Emphysema, unspecified     GERD (gastroesophageal reflux disease)     Hypertension     Thyroid disease      History reviewed. No pertinent surgical history.  Family History     None        Social History Main Topics    Smoking status: Never Smoker    Smokeless tobacco: Never Used    Alcohol use No    Drug use: No    Sexual activity: No     Review of Systems   Unable to obtain 2/2 to intubation   Objective:     Weight: 51.9 kg (114 lb 6.7 oz)  Body mass index is 19.64 kg/m².  Vital Signs (Most Recent):  Temp: (!) 102.2 °F (39 °C) (17 1404)  Pulse: 69 (17 1732)  Resp: 14 (17 1350)  BP: 112/68 (17 1732)  SpO2: 100 % (17 1732) Vital Signs (24h Range):  Temp:  [98 °F (36.7 °C)-102.2 °F (39 °C)] 102.2 °F (39 °C)  Pulse:  [] 69  Resp:  [14-31] 14  SpO2:  [97 %-100 %] 100 %  BP: ()/() 112/68            Temp (24hrs), Av.9 °F (37.7 °C), Min:98 °F (36.7 °C), Max:102.2 °F (39 °C)    Vent Mode: A/C  Oxygen  Concentration (%):  [] 40  Resp Rate Total:  [16 br/min-26 br/min] 21 br/min  Vt Set:  [400 mL] 400 mL  PEEP/CPAP:  [5 cmH20] 5 cmH20  Pressure Support:  [0 cmH20] 0 cmH20  Mean Airway Pressure:  [8 htS20-50 cmH20] 11 cmH20            Neurosurgery Physical Exam   Examined while intubated, sedated on propofol   E2VTM5  + corneal reflex b/l  + gag  Pupils 2mm, reactive  Localizes in RUE, LUE strength - 2/5  Withdraws briskly in B/L LEs  Skin is jaundice   No c/c/e    Significant Labs:    Recent Labs  Lab 07/18/17 2212 07/19/17  1449   GLU 98  --    * 134*   K 3.3*  --    CL 99  --    CO2 23  --    BUN 10  --    CREATININE 0.8  --    CALCIUM 9.1  --    MG 1.6  --        Recent Labs  Lab 07/18/17 0419 07/18/17 2212   WBC 8.50 12.77*   HGB 9.8* 10.6*   HCT 28.3* 31.6*   * 170       Recent Labs  Lab 07/18/17 0419 07/18/17 2212 07/19/17  1449   LABPT 13.5  --   --   --    INR 1.0  < > 1.0 1.0   APTT  --   --  25.3  --    < > = values in this interval not displayed.  Microbiology Results (last 7 days)     Procedure Component Value Units Date/Time    Blood culture [[id number]] Collected:  07/19/17 1505    Order Status:  Sent Specimen:  Blood from Peripheral, Hand, Left Updated:  07/19/17 1513    Blood culture [[id number]] Collected:  07/19/17 1455    Order Status:  Sent Specimen:  Blood from Peripheral, Forearm, Left Updated:  07/19/17 1509    Culture, Respiratory with Gram Stain [[id number]]     Order Status:  No result Specimen:  Respiratory             Significant Diagnostics:  CT: Ct Head Without Contrast    Result Date: 7/19/2017   There are CT findings of a large, acute intracranial hemorrhage involving the medial right temporal lobe with evidence of mass effect as described above. These findings were discussed with Dr. Galindo on 7/19/17 t 11:10 am   Electronically signed by: BENITO DELGADO MD Date: 07/19/17        Time:11:10     Assessment/Plan:     * Spontaneous intraparenchymal intracranial  hemorrhage, acute    58 yo F with non traumatic right temporal ICH  - Admit to neuro ICU q 1 hour neuro checks  - CTA Head is pending to r/o vascular etiology  - No acute neurosurgical interventions   - Strict BP control, SBP < 160  - Keppra for Sz prophylaxis   - Will continue to follow, please call with questions  - Discussed with Dr. Jasen Rivas PA  Neurosurgery  [facility]

## 2017-07-20 NOTE — ED NOTES
Pt continues to be bradycardic. Stroke called and informed. States unless pt becomes hypotensive or HR less than 35 to continue to monitor pt.

## 2017-07-20 NOTE — HPI
57 year old female with a PMHx of prior Stroke, Asthma, coronary artery disease (S/P Cardiac Surgery), Depression, Emphysema, GERD, Hypertension, thyroid disease, with recent Biliary Stent 7/18, who presents to Neuro Critical Care as a transfer from [facility] s/ Right Temporal ICH. Per the chart,  she was admitted to [facility] from Nursing Home on 7/12/17 with altered mental status and increase jaundice upon admission PT-INR 4.9, , , Ammonia <9 on 7/12. She was on Plavix prior to admit for coronary artery disease which was upon admission. CT of abdomen on 7/13/17 showed 2cm stone distal common duct resulting in moderate biliary ductal dilatation and 3cm infrarenal abdominal aortic aneurysm. On 7/14 Urine culture positive for gram negative rods. She is being treated with IV Ceftriaxone.  7/15 liver were enzymes worsening.   On 7/18/17 GI attempted ERCP, but could not get stone out. Was able to place stent though with only old bile noted behind the stone. he was started on nicardipine gtt for goal  to 140. Her GCS was 9 to 10, and she was intubated for airway protection prior to arrival at [facility]. She lives at [facility]. No family at bedside. Patient will be admitted to Neuro Critical Care for a higher level of care.        Hospital Course: 7/19 admitted to Neuro Critical Care as a transfer from [facility] s/ Right Temporal ICH 4.2 cm ; recent Biliary Stent 7/18 at outside hospital. We are cnnsulted for repeat of ERCP; started abx, pending CTA

## 2017-07-20 NOTE — ASSESSMENT & PLAN NOTE
57 year old female with a PMHx of prior Stroke, Asthma, coronary artery disease (S/P Cardiac Surgery), Depression, Emphysema, GERD, Hypertension, thyroid disease, with recent Biliary Stent 7/18, who presents to Neuro Critical Care as a transfer from [facility]/ Right Temporal [facility]. she was admitted to [facility] from Nursing Home on 7/12/17 with altered mental status and increase jaundice upon admission PT-INR 4.9, , , Ammonia <9 on 7/12. She was on Plavix prior to admit for coronary artery disease which was upon admission. CT of abdomen on 7/13/17 showed 2cm stone distal common duct resulting in moderate biliary ductal dilatation and 3cm infrarenal abdominal aortic aneurysm. On 7/14 Urine culture positive for gram negative rods. She is being treated with IV Ceftriaxone.  7/15 liver were enzymes worsening but improving after ERCP. On 7/18/17 GI attempted ERCP, but could not get stone out. Was able to place stent though with only old bile noted behind the stone.      7/19 admitted to Neuro Critical Care as a transfer from [facility] s/ Right Temporal ICH 4.2 cm ;Cultured today, we are cOnsulted for repeat of ERCP; started abx, pending CTA    - Patient will certainly warrant repeat ERCP at some point for stone removal, however she does have a biliary stent in place with trending down bilirubin so this will likely be delayed until all of her acute issues are improving and stable

## 2017-07-20 NOTE — PROGRESS NOTES
[facility]  Vascular Neurology  Comprehensive Stroke Center  Progress Note    Assessment/Plan:     7/19: AMS this AM. CT head shows R temporal ICH with IVH. Transferred to [facility] ED and admitted to [facility].    07/20: remains intubated and on cardene, patient not sedated. See by GI for bile duct stone and plans to repeat ERCP. Patient with leukocytosis-cultures pending, on vanc/zosyn    * Nontraumatic intracerebral hemorrhage in hemisphere, unspecified    57 y.o. female PMH hepatitis C with transaminitis, CAD, thyroid disease, and h/o CVA on ASA, plavix. CT with R temporal ICH    Antiplatelets: None - hemorrhage  Statins: None - hemorrhage  SBP < 140  DVT ppx: TEDs, SCDs  PT/OT and speech to evaluate and treat  Neuro checks qh        Vasogenic cerebral edema    2/2 ICH  Evident on imaging        Essential hypertension    - Stroke risk factor.  - Management per primary team.  - SBP<140 in setting of bleed.  - on cardene        Elevated liver enzymes    Currently jaundiced  Management per primary team.        Jaundice    Patient with history of hep C and with current bile duct stone  GI following            Neurologic Chief Complaint: R temporal ICH    Subjective:     Interval History: Patient is seen for follow-up neurological assessment and treatment recommendations: NAEON, remains intubated, not sedated    HPI, Past Medical, Family, and Social History remains the same as documented in the initial encounter.     Review of Systems   Constitutional: Positive for fever.   Respiratory:        Intubated   Cardiovascular: Negative for leg swelling.   Gastrointestinal: Negative for vomiting.   Skin: Positive for color change.   Neurological: Positive for speech difficulty.   Psychiatric/Behavioral: Negative for agitation.     Scheduled Meds:   amlodipine  5 mg Oral Daily    chlorhexidine  15 mL Mouth/Throat BID    famotidine  20 mg Per NG tube BID    lactulose  10 g Per NG tube TID    levetiracetam in NaCl  (iso-os)  500 mg Intravenous Q12H    levothyroxine  25 mcg Per OG tube Before breakfast    piperacillin-tazobactam 4.5 g in dextrose 5 % 100 mL IVPB (ready to mix system)  4.5 g Intravenous Q8H    sodium chloride 0.9%  500 mL Intravenous Once    sodium chloride 0.9%  3 mL Intravenous Q8H    vancomycin (VANCOCIN) IVPB  750 mg Intravenous Q12H     Continuous Infusions:   sodium chloride 0.9% 50 mL/hr at 07/20/17 1000    nicardipine 2.5 mg/hr (07/20/17 1000)     PRN Meds:fentaNYL, magnesium oxide, magnesium oxide, potassium chloride 10%, potassium chloride 10%, potassium chloride 10%, potassium, sodium phosphates, potassium, sodium phosphates, potassium, sodium phosphates, buffered 2% sodium acetate 86meq, sodium chloride 86meq, sterile water for inj IV soln    Objective:     Vital Signs (Most Recent):  Temp: 98.3 °F (36.8 °C) (07/20/17 0701)  Pulse: (!) 57 (07/20/17 1000)  Resp: 12 (07/20/17 1000)  BP: 119/74 (07/20/17 1000)  SpO2: 100 % (07/20/17 1000)       Vital Signs Range (Last 24H):  Temp:  [97.9 °F (36.6 °C)-102.2 °F (39 °C)]   Pulse:  []   Resp:  [12-32]   BP: ()/()   SpO2:  [99 %-100 %]   Arterial Line BP: ()/(50-95)        Physical Exam   Constitutional: She appears well-developed and well-nourished. No distress.   HENT:   Head: Normocephalic and atraumatic.   Eyes: Pupils are equal, round, and reactive to light.   Scleral icterus   Cardiovascular: Bradycardia present.    Pulmonary/Chest:   intubated   Abdominal: Soft.   Skin: Skin is warm and dry.   Jaundice skin       Neurological Exam:   LOC: drowsy, off sedation, intubated  Language: Intubated  Speech: Intubated  Orientation: Intubated  Pupils (CN III, IV, VI): PERRL  Motor*: moves all extremities spontaneously with equal strength, strength 3/5 in all 4 limbs    NIH Stroke Scale:    Level of Consciousness: 1 - drowsy  LOC Questions: 2 - answers none correctly  LOC Commands: 2 - performs neither correctly  Best Gaze: 0 -  normal  Visual: 0 - no visual loss  Facial Palsy: 0 - normal  Motor Left Arm: 2 - can't resist gravity  Motor Right Arm: 2 - can't resist gravity  Motor Left Le - can't resist gravity  Motor Right Le - can't resist gravity  Limb Ataxia: 0 - absent  Sensory: 0 - normal  Best Language: 3 - mute  Dysarthria: UN - intubated or other barrier  Extinction and Inattention: 0 - no neglect  NIH Stroke Scale Total: 16      Laboratory:  CMP:   Recent Labs  Lab 17  0613 17  0815   CALCIUM 8.8  --   --    ALBUMIN 2.1*  --   --    PROT 6.1  --   --    * 134*  --    K 2.9*  --  2.9*   CO2 24  --   --      --   --    BUN 10  --   --    CREATININE 0.8  --   --    ALKPHOS 460*  --   --    *  --   --    AST 76*  --   --    BILITOT 7.7*  --   --      CBC:   Recent Labs  Lab 17   WBC 11.64   RBC 3.03*   HGB 9.3*   HCT 27.0*      MCV 89   MCH 30.7   MCHC 34.4     Lipid Panel:   Recent Labs  Lab 17  1449   CHOL 420*   LDLCALC 342.0*   HDL 11*   TRIG 335*     Coagulation:   Recent Labs  Lab 17  2212  17   INR 1.0  < > 1.1   APTT 25.3  --   --    < > = values in this interval not displayed.  Platelet Aggregation Study: No results for input(s): PLTAGG, PLTAGINTERP, PLTAGREGLACO, ADPPLTAGGREG in the last 168 hours.  Hgb A1C:   Recent Labs  Lab 17  1449   HGBA1C 4.6     TSH:   Recent Labs  Lab 17  1449   TSH 3.018       Diagnostic Results:  I have personally reviewed:   Findings:     CT Head. Date: 17  There are CT findings of a large, acute intracranial hemorrhage involving the medial right temporal lobe with evidence of mass effect as described above.    CTA Head/Neck. Date: 17  Acute right temporal intraparenchymal hemorrhage grossly stable to the prior exam with persistent edema and localized mass effect. No significant hydrocephalus or midline shift.    Scattered atherosclerosis of the bilateral ICAs resulting in 50%  stenosis of the right cavernous ICA. No large vessel occlusion or aneurysm identified.    Age advanced senescent changes.    Remote right basal ganglia and left cerebral and cerebellar infarcts, unchanged from examination performed earlier today.            Laura Dumas PA-C  Comprehensive Stroke Center  Department of Vascular Neurology   [facility]

## 2017-07-20 NOTE — PT/OT/SLP EVAL
Physical Therapy  Evaluation  Bed Level Nicolasa Robles   MRN: [id number]   Admitting Diagnosis: Nontraumatic intracerebral hemorrhage in hemisphere, unspecified    PT Received On: 07/20/17  PT Start Time: 1552     PT Stop Time: 1617    PT Total Time (min): 25 min       Billable Minutes:  Evaluation 10 and Therapeutic Exercise 15    Diagnosis: Nontraumatic intracerebral hemorrhage in hemisphere, unspecified    Comorbidities and personal factors that affect the PT plan of care or the patient's ability to participation or progress with therapy:  1. H/o L MCA stroke   2. Depression   3. Emphysema  4. UTI  5. Recent biliary stent    Clinical Presentation: unstable/unpredictable characteristics  Pt currently intubated, not following commands, lethargic with eyes open 5% of session, and exhibits only withdrawal of extremities during ROM.  Pt not currently stable or appropriate for mobilization.     Level of Complexity:   High Complexity:   · At least 3 or more personal factors or comorbidities that impact the plan of care  · Examination addressing 4 or more body structures and functions, activity limitations, and/or participation restrictions  · Clinical presentation with unstable or unpredictable characteristics       Past Medical History:   Diagnosis Date    Asthma     Coronary artery disease     Depression     Emphysema, unspecified     GERD (gastroesophageal reflux disease)     Hypertension     Thyroid disease       History reviewed. No pertinent surgical history.    Referring physician: GERTRUDE Duenas  Date referred to PT: 7/19/2017    General Precautions: Standard,  NPO, aspiration precautions, wrist restraints, seizure precautions    Patient History:  Living Environment Comment: Pt lived at [facility] [place] prior to admit where she required assistance with all ADLs and was w/c bound.  She does not have any family and is her own decision maker.    Equipment Currently Used at Home:  (w/c  and DME provided by facility )    Subjective:  Communicated with RN prior to session.  Patient intubated, minimal wakefulness and not following any commands during session.   Chief Complaint: intubated   Patient goals: Goals set by therapist at this time.     Pain/Comfort  Pain Rating 1:  (withdrawl response to pain )      Objective:   Patient found with: arterial line, blood pressure cuff, jenkins catheter, NG tube, pressure relief boots, peripheral IV, ventilator, restraints, telemetry, pulse ox (continuous)   Pt intubated with RN present during assessment and ROM.  Pt opened eyes slightly 5-10% of session, but otherwise kept eyes closed.      Cognitive Exam:  Oriented to: intubated, non verbal    Follows Commands/attention: lethargic, inattentive  Communication: non verbal   Safety awareness/insight to disability: impaired    Physical Exam:  Postural examination/scapula alignment: Unable to assess at this time.     Skin integrity: jaundiced appearance, healing wound on R knee   Edema: None noted     Sensation:   Unable to assess 2* cognition and intubation     Upper Extremity Range of Motion:  Right Upper Extremity: PROM WFL with hypertonicity grossly 2 throughout (Modified Gracie Scale), 3 MAS L hand  Left Upper Extremity: PROM WFL with hypertonicity grossly 3 throughout (Modified Gracie Scale)    Lower Extremity Range of Motion:  Right Lower Extremity: PROM WFL with hypertonicity 2 (MAS) hip flexion, 3 (MAS) hip ABD/ADD, 2 plantar flexors  Left Lower Extremity: PROM WFL with hypertonicity 2 (MAS) hip flexion, 3 hip ABD/ADD, 4 (MAS) plantar flexors     Upper/Lower Extremity Strength: no active movement observed, except withdrawal response    Fine motor coordination: unable to assess    Gross motor coordination: impaired, hypertonicity   Continuous clonus on R ankle    Functional Mobility:  Pt is being seen for ROM only at this time until appropriate to progress.      Therapeutic Activities and  Exercises:  Therapist educated patient on role of PT, POC    Bilateral UE and LE passive ROM performed 1x10 to all joints in all planes. Scapular support provided and shoulder ROM performed to 90* only.     AM-PAC 6 CLICK MOBILITY  How much help from another person does this patient currently need?   1 = Unable, Total/Dependent Assistance  2 = A lot, Maximum/Moderate Assistance  3 = A little, Minimum/Contact Guard/Supervision  4 = None, Modified [place]/Independent    Turning over in bed (including adjusting bedclothes, sheets and blankets)?: 1  Sitting down on and standing up from a chair with arms (e.g., wheelchair, bedside commode, etc.): 1  Moving from lying on back to sitting on the side of the bed?: 1  Moving to and from a bed to a chair (including a wheelchair)?: 1  Need to walk in hospital room?: 1  Climbing 3-5 steps with a railing?: 1  Total Score: 6     AM-PAC Raw Score CMS G-Code Modifier Level of Impairment Assistance   6 % Total / Unable   7 - 9 CM 80 - 100% Maximal Assist   10 - 14 CL 60 - 80% Moderate Assist   15 - 19 CK 40 - 60% Moderate Assist   20 - 22 CJ 20 - 40% Minimal Assist   23 CI 1-20% SBA / CGA   24 CH 0% Independent/ Mod I     Patient left with bed position unchanged during session with all lines intact and RN present. UE restraints reapplied by PT following ROM.     Assessment:   Malina Robles is a 57 y.o. female with a medical diagnosis of Nontraumatic intracerebral hemorrhage in hemisphere, unspecified and presents with significant deficits in functional mobility and continues to be intubated.  Pt tolerated therapy session with VSS, but did not demonstrate any command following and minimal eye opening 5-10% session.  Pt would benefit from skilled PT services for ROM and progressive mobilization when appropriate.  She is appropriate for only 1 therapy discipline at this time.      Rehab identified problem list/impairments: Rehab identified problem list/impairments:  impaired self care skills, impaired functional mobilty, impaired cognition, decreased lower extremity function, decreased upper extremity function, abnormal tone    Rehab potential is fair.    Activity tolerance: Fair    Discharge recommendations: Discharge Facility/Level Of Care Needs:  SNF (return to [facility])     Barriers to discharge: Barriers to Discharge: None    Equipment recommendations: Equipment Needed After Discharge: none     GOALS:    Physical Therapy Goals        Problem: Physical Therapy Goal    Goal Priority Disciplines Outcome Goal Variances Interventions   Physical Therapy Goal     PT/OT, PT      Description:  Goals to be met by: 8/3/2017     Patient will increase functional independence with mobility by performin. Pt will participate in bilateral UE and LE ROM exercises on 3 consecutive visits with no change in vitals.   2. Pt will perform rolling R and L with moderate assistance  3. Supine to sit with moderate assistance   4. Sit to supine with moderate assistance   5.  Pt will tolerate sitting EOB x 10 minutes with moderate assistance                       PLAN:    Patient to be seen 6x/wk for ROM while intubated and progressive mobilization when appropriate  Plan of Care expires: 17  Plan of Care reviewed with: patient          Airam Vazquez, PT  2017

## 2017-07-20 NOTE — PLAN OF CARE
07/20/17 1600   Discharge Assessment   Assessment Type Discharge Planning Assessment   Confirmed/corrected address and phone number on facesheet? Yes   Assessment information obtained from? Medical Record;Caregiver  (Per Diana, patient's nurse at [facility])   Expected Length of Stay (days) 3   Communicated expected length of stay with patient/caregiver no   Prior to hospitilization cognitive status: Alert/Oriented   Prior to hospitalization functional status: Wheelchair Bound;Needs Assistance   Current cognitive status: Unable to Assess   Current Functional Status: Completely Dependent   Arrived From admitted as an inpatient;nursing home  ([facility])   Lives With facility resident   Able to Return to Prior Arrangements yes   Is patient able to care for self after discharge? No   Does the patient have family/friends to help with healtcare needs after discharge? no   How many people do you have in your home that can help with your care after discharge? other (see comments)  (facility resident)   Who are your caregiver(s) and their phone number(s)? Per Diana at [facility], the patient is her own decision maker but there is a number listed on her chart for Scott Travis (relationship unknown)  [phone]   Patient's perception of discharge disposition other (comments)  (carri)   Readmission Within The Last 30 Days no previous admission in last 30 days   Patient currently being followed by outpatient case management? Unable to determine (comments)   Patient currently receives home health services? No   Does the patient currently use HME? Yes   Patient currently receives private duty nursing? N/A   Patient currently receives any other outside agency services? No   Equipment Currently Used at Home wheelchair   Do you have any problems affording any of your prescribed medications? No   Is the patient taking medications as prescribed? yes   Do you have any financial concerns preventing you from  receiving the healthcare you need? No   Does the patient have transportation to healthcare appointments? Yes   Transportation Available agency transportation   On Dialysis? No   Does the patient receive services at the [facility]madin Clinic? No   Are there any open cases? No   Discharge Plan A Return to nursing home   Discharge Plan B New Nursing Home placement - correction care facility   Patient/Family In Agreement With Plan unable to assess         Discharge/ My Health Packet Folder Given to patient/family:      yes      PCP:  Jose Rios MD        Pharmacy:  [facility]    Emergency Contacts:  Extended Emergency Contact Information  Primary Emergency Contact: No,Contact   [place]  Home Phone: [phone]  Relation: Other  Per Diana at [facility], the patient is her own decision maker but there is a number listed on her chart for Scott Travis (relationship unknown)  [phone]    Insurance:  Payor: MEDICAID / Plan: MEDICAID OF [place] / Product Type: Government /

## 2017-07-20 NOTE — PROGRESS NOTES
Patient arrived to [facility] from ED via RN and RT transport.    Patient connected to cardiac monitor and ventilator.     Type of Stroke: R temporal IPH    See doc flowsheets for complete assessment details.    Notified NCC team of patient arrival to unit.

## 2017-07-20 NOTE — PLAN OF CARE
Problem: Patient Care Overview  Goal: Plan of Care Review  Outcome: Ongoing (interventions implemented as appropriate)  POC reviewed with pt and family at 1400. Pt showed no evidence of understanding. Questions and concerns addressed. No acute events today. Pt progressing toward goals. Will continue to monitor. See flowsheets for full assessment and VS info.

## 2017-07-20 NOTE — ASSESSMENT & PLAN NOTE
Transferred to ICU to start on nicardipine gtt. Goal SBP from 120 to 140 with the continuous gtt. She has not been on her prior clopidogrel or ASA for at least 6 days while in the other hospital prior to the ERCP. Intubated her for airway protection prior to [place] [facility] [place] level of care and NSGY consultation.

## 2017-07-21 PROBLEM — G93.6 VASOGENIC CEREBRAL EDEMA: Status: ACTIVE | Noted: 2017-07-21

## 2017-07-21 LAB
ALBUMIN SERPL BCP-MCNC: 2.2 G/DL
ALLENS TEST: ABNORMAL
ALP SERPL-CCNC: 389 U/L
ALT SERPL W/O P-5'-P-CCNC: 124 U/L
AMMONIA PLAS-SCNC: 42 UMOL/L
AMMONIA PLAS-SCNC: 50 UMOL/L
AMYLASE SERPL-CCNC: 49 U/L
ANION GAP SERPL CALC-SCNC: 9 MMOL/L
AST SERPL-CCNC: 77 U/L
BASOPHILS # BLD AUTO: 0.06 K/UL
BASOPHILS NFR BLD: 0.5 %
BILIRUB SERPL-MCNC: 6 MG/DL
BUN SERPL-MCNC: 7 MG/DL
CALCIUM SERPL-MCNC: 8.2 MG/DL
CHLORIDE SERPL-SCNC: 105 MMOL/L
CO2 SERPL-SCNC: 24 MMOL/L
CREAT SERPL-MCNC: 0.7 MG/DL
DELSYS: ABNORMAL
DIFFERENTIAL METHOD: ABNORMAL
EOSINOPHIL # BLD AUTO: 0.4 K/UL
EOSINOPHIL NFR BLD: 3.2 %
ERYTHROCYTE [DISTWIDTH] IN BLOOD BY AUTOMATED COUNT: 15.5 %
ERYTHROCYTE [SEDIMENTATION RATE] IN BLOOD BY WESTERGREN METHOD: 12 MM/H
EST. GFR  (AFRICAN AMERICAN): >60 ML/MIN/1.73 M^2
EST. GFR  (NON AFRICAN AMERICAN): >60 ML/MIN/1.73 M^2
FIO2: 40
GLUCOSE SERPL-MCNC: 88 MG/DL
HCO3 UR-SCNC: 30.9 MMOL/L (ref 24–28)
HCT VFR BLD AUTO: 23.8 %
HGB BLD-MCNC: 8 G/DL
INR PPP: 1
LIPASE SERPL-CCNC: 37 U/L
LYMPHOCYTES # BLD AUTO: 3.5 K/UL
LYMPHOCYTES NFR BLD: 28.3 %
MAGNESIUM SERPL-MCNC: 1.9 MG/DL
MAGNESIUM SERPL-MCNC: 1.9 MG/DL
MAGNESIUM SERPL-MCNC: 2 MG/DL
MCH RBC QN AUTO: 31.5 PG
MCHC RBC AUTO-ENTMCNC: 33.6 G/DL
MCV RBC AUTO: 94 FL
MIN VOL: 5
MODE: ABNORMAL
MONOCYTES # BLD AUTO: 1.2 K/UL
MONOCYTES NFR BLD: 9.9 %
NEUTROPHILS # BLD AUTO: 6.9 K/UL
NEUTROPHILS NFR BLD: 56.1 %
PCO2 BLDA: 37.2 MMHG (ref 35–45)
PEEP: 5
PH SMN: 7.53 [PH] (ref 7.35–7.45)
PHOSPHATE SERPL-MCNC: 2.6 MG/DL
PHOSPHATE SERPL-MCNC: 3.6 MG/DL
PIP: 20
PLATELET # BLD AUTO: 209 K/UL
PMV BLD AUTO: 9.9 FL
PO2 BLDA: 175 MMHG (ref 80–100)
POC BE: 8 MMOL/L
POC SATURATED O2: 100 % (ref 95–100)
POC TCO2: 32 MMOL/L (ref 23–27)
POCT GLUCOSE: 106 MG/DL (ref 70–110)
POCT GLUCOSE: 114 MG/DL (ref 70–110)
POCT GLUCOSE: 165 MG/DL (ref 70–110)
POCT GLUCOSE: 89 MG/DL (ref 70–110)
POCT GLUCOSE: 99 MG/DL (ref 70–110)
POTASSIUM SERPL-SCNC: 3.6 MMOL/L
POTASSIUM SERPL-SCNC: 3.7 MMOL/L
POTASSIUM SERPL-SCNC: 4.2 MMOL/L
PROT SERPL-MCNC: 6.2 G/DL
PROTHROMBIN TIME: 11 SEC
RBC # BLD AUTO: 2.54 M/UL
SAMPLE: ABNORMAL
SITE: ABNORMAL
SODIUM SERPL-SCNC: 136 MMOL/L
SODIUM SERPL-SCNC: 138 MMOL/L
SODIUM SERPL-SCNC: 138 MMOL/L
SP02: 100
TROPONIN I SERPL DL<=0.01 NG/ML-MCNC: <0.006 NG/ML
VANCOMYCIN SERPL-MCNC: 12.5 UG/ML
VT: 400
WBC # BLD AUTO: 12.28 K/UL

## 2017-07-21 PROCEDURE — 94761 N-INVAS EAR/PLS OXIMETRY MLT: CPT

## 2017-07-21 PROCEDURE — 84132 ASSAY OF SERUM POTASSIUM: CPT

## 2017-07-21 PROCEDURE — 83690 ASSAY OF LIPASE: CPT

## 2017-07-21 PROCEDURE — 80053 COMPREHEN METABOLIC PANEL: CPT

## 2017-07-21 PROCEDURE — 36569 INSJ PICC 5 YR+ W/O IMAGING: CPT

## 2017-07-21 PROCEDURE — 83735 ASSAY OF MAGNESIUM: CPT

## 2017-07-21 PROCEDURE — 20000000 HC ICU ROOM

## 2017-07-21 PROCEDURE — 97110 THERAPEUTIC EXERCISES: CPT

## 2017-07-21 PROCEDURE — 83735 ASSAY OF MAGNESIUM: CPT | Mod: 91

## 2017-07-21 PROCEDURE — 99233 SBSQ HOSP IP/OBS HIGH 50: CPT | Mod: ,,, | Performed by: PSYCHIATRY & NEUROLOGY

## 2017-07-21 PROCEDURE — 84484 ASSAY OF TROPONIN QUANT: CPT

## 2017-07-21 PROCEDURE — 84100 ASSAY OF PHOSPHORUS: CPT | Mod: 91

## 2017-07-21 PROCEDURE — 84100 ASSAY OF PHOSPHORUS: CPT

## 2017-07-21 PROCEDURE — 25000003 PHARM REV CODE 250: Performed by: PHYSICIAN ASSISTANT

## 2017-07-21 PROCEDURE — 99233 SBSQ HOSP IP/OBS HIGH 50: CPT | Mod: ,,, | Performed by: NEUROLOGICAL SURGERY

## 2017-07-21 PROCEDURE — 85025 COMPLETE CBC W/AUTO DIFF WBC: CPT

## 2017-07-21 PROCEDURE — 85610 PROTHROMBIN TIME: CPT

## 2017-07-21 PROCEDURE — 63600175 PHARM REV CODE 636 W HCPCS: Performed by: NURSE PRACTITIONER

## 2017-07-21 PROCEDURE — 25000003 PHARM REV CODE 250: Performed by: NURSE PRACTITIONER

## 2017-07-21 PROCEDURE — 84295 ASSAY OF SERUM SODIUM: CPT

## 2017-07-21 PROCEDURE — C1751 CATH, INF, PER/CENT/MIDLINE: HCPCS

## 2017-07-21 PROCEDURE — 84132 ASSAY OF SERUM POTASSIUM: CPT | Mod: 91

## 2017-07-21 PROCEDURE — 05H333Z INSERTION OF INFUSION DEVICE INTO RIGHT INNOMINATE VEIN, PERCUTANEOUS APPROACH: ICD-10-PCS | Performed by: PSYCHIATRY & NEUROLOGY

## 2017-07-21 PROCEDURE — 94003 VENT MGMT INPAT SUBQ DAY: CPT

## 2017-07-21 PROCEDURE — 82140 ASSAY OF AMMONIA: CPT

## 2017-07-21 PROCEDURE — 84295 ASSAY OF SERUM SODIUM: CPT | Mod: 91

## 2017-07-21 PROCEDURE — 80202 ASSAY OF VANCOMYCIN: CPT

## 2017-07-21 PROCEDURE — 82150 ASSAY OF AMYLASE: CPT

## 2017-07-21 PROCEDURE — 99291 CRITICAL CARE FIRST HOUR: CPT | Mod: ,,, | Performed by: PSYCHIATRY & NEUROLOGY

## 2017-07-21 PROCEDURE — 63600175 PHARM REV CODE 636 W HCPCS: Performed by: PHYSICIAN ASSISTANT

## 2017-07-21 PROCEDURE — 97530 THERAPEUTIC ACTIVITIES: CPT

## 2017-07-21 PROCEDURE — 76937 US GUIDE VASCULAR ACCESS: CPT

## 2017-07-21 PROCEDURE — 27000221 HC OXYGEN, UP TO 24 HOURS

## 2017-07-21 RX ORDER — LANOLIN ALCOHOL/MO/W.PET/CERES
800 CREAM (GRAM) TOPICAL
Status: DISCONTINUED | OUTPATIENT
Start: 2017-07-21 | End: 2017-07-26

## 2017-07-21 RX ORDER — SODIUM CHLORIDE 0.9 % (FLUSH) 0.9 %
10 SYRINGE (ML) INJECTION EVERY 6 HOURS
Status: DISCONTINUED | OUTPATIENT
Start: 2017-07-21 | End: 2017-07-31 | Stop reason: HOSPADM

## 2017-07-21 RX ORDER — HYDRALAZINE HYDROCHLORIDE 20 MG/ML
10 INJECTION INTRAMUSCULAR; INTRAVENOUS EVERY 4 HOURS PRN
Status: DISCONTINUED | OUTPATIENT
Start: 2017-07-21 | End: 2017-07-24

## 2017-07-21 RX ORDER — POTASSIUM CHLORIDE 20 MEQ/15ML
40 SOLUTION ORAL
Status: DISCONTINUED | OUTPATIENT
Start: 2017-07-21 | End: 2017-07-26

## 2017-07-21 RX ORDER — SODIUM,POTASSIUM PHOSPHATES 280-250MG
2 POWDER IN PACKET (EA) ORAL
Status: DISCONTINUED | OUTPATIENT
Start: 2017-07-21 | End: 2017-07-26

## 2017-07-21 RX ORDER — 3% SODIUM CHLORIDE 3 G/100ML
300 INJECTION, SOLUTION INTRAVENOUS EVERY 6 HOURS PRN
Status: DISPENSED | OUTPATIENT
Start: 2017-07-21 | End: 2017-07-21

## 2017-07-21 RX ORDER — SODIUM CHLORIDE 0.9 % (FLUSH) 0.9 %
10 SYRINGE (ML) INJECTION
Status: DISCONTINUED | OUTPATIENT
Start: 2017-07-21 | End: 2017-07-31 | Stop reason: HOSPADM

## 2017-07-21 RX ORDER — POTASSIUM CHLORIDE 20 MEQ/15ML
60 SOLUTION ORAL
Status: DISCONTINUED | OUTPATIENT
Start: 2017-07-21 | End: 2017-07-26

## 2017-07-21 RX ORDER — HYDRALAZINE HYDROCHLORIDE 20 MG/ML
INJECTION INTRAMUSCULAR; INTRAVENOUS
Status: DISPENSED
Start: 2017-07-21 | End: 2017-07-21

## 2017-07-21 RX ORDER — HEPARIN SODIUM 5000 [USP'U]/ML
5000 INJECTION, SOLUTION INTRAVENOUS; SUBCUTANEOUS EVERY 12 HOURS
Status: DISCONTINUED | OUTPATIENT
Start: 2017-07-21 | End: 2017-07-31 | Stop reason: HOSPADM

## 2017-07-21 RX ADMIN — PIPERACILLIN AND TAZOBACTAM 4.5 G: 4; .5 INJECTION, POWDER, LYOPHILIZED, FOR SOLUTION INTRAVENOUS; PARENTERAL at 12:07

## 2017-07-21 RX ADMIN — Medication 3 ML: at 02:07

## 2017-07-21 RX ADMIN — HEPARIN SODIUM 5000 UNITS: 5000 INJECTION, SOLUTION INTRAVENOUS; SUBCUTANEOUS at 09:07

## 2017-07-21 RX ADMIN — PIPERACILLIN AND TAZOBACTAM 4.5 G: 4; .5 INJECTION, POWDER, LYOPHILIZED, FOR SOLUTION INTRAVENOUS; PARENTERAL at 03:07

## 2017-07-21 RX ADMIN — Medication 3 ML: at 05:07

## 2017-07-21 RX ADMIN — SODIUM CHLORIDE: 234 INJECTION INTRAMUSCULAR; INTRAVENOUS; SUBCUTANEOUS at 08:07

## 2017-07-21 RX ADMIN — POTASSIUM CHLORIDE 40 MEQ: 20 SOLUTION ORAL at 06:07

## 2017-07-21 RX ADMIN — LACTULOSE 10 G: 20 SOLUTION ORAL at 05:07

## 2017-07-21 RX ADMIN — HYDRALAZINE HYDROCHLORIDE 10 MG: 20 INJECTION INTRAMUSCULAR; INTRAVENOUS at 05:07

## 2017-07-21 RX ADMIN — HYDRALAZINE HYDROCHLORIDE 10 MG: 20 INJECTION INTRAMUSCULAR; INTRAVENOUS at 01:07

## 2017-07-21 RX ADMIN — CHLORHEXIDINE GLUCONATE 15 ML: 1.2 RINSE ORAL at 09:07

## 2017-07-21 RX ADMIN — FENTANYL CITRATE 25 MCG: 50 INJECTION INTRAMUSCULAR; INTRAVENOUS at 03:07

## 2017-07-21 RX ADMIN — LEVETIRACETAM 500 MG: 5 INJECTION INTRAVENOUS at 09:07

## 2017-07-21 RX ADMIN — SODIUM CHLORIDE: 234 INJECTION INTRAMUSCULAR; INTRAVENOUS; SUBCUTANEOUS at 01:07

## 2017-07-21 RX ADMIN — LEVOTHYROXINE SODIUM 25 MCG: 25 TABLET ORAL at 05:07

## 2017-07-21 RX ADMIN — POTASSIUM & SODIUM PHOSPHATES POWDER PACK 280-160-250 MG 2 PACKET: 280-160-250 PACK at 06:07

## 2017-07-21 RX ADMIN — POTASSIUM & SODIUM PHOSPHATES POWDER PACK 280-160-250 MG 2 PACKET: 280-160-250 PACK at 10:07

## 2017-07-21 RX ADMIN — LACTULOSE 10 G: 20 SOLUTION ORAL at 01:07

## 2017-07-21 RX ADMIN — VANCOMYCIN HYDROCHLORIDE 750 MG: 750 INJECTION, POWDER, LYOPHILIZED, FOR SOLUTION INTRAVENOUS at 07:07

## 2017-07-21 RX ADMIN — LACTULOSE 10 G: 20 SOLUTION ORAL at 09:07

## 2017-07-21 RX ADMIN — FAMOTIDINE 20 MG: 20 TABLET, FILM COATED ORAL at 09:07

## 2017-07-21 RX ADMIN — Medication 10 ML: at 11:07

## 2017-07-21 RX ADMIN — SODIUM CHLORIDE 300 ML: 3 INJECTION, SOLUTION INTRAVENOUS at 08:07

## 2017-07-21 RX ADMIN — VANCOMYCIN HYDROCHLORIDE 750 MG: 750 INJECTION, POWDER, LYOPHILIZED, FOR SOLUTION INTRAVENOUS at 08:07

## 2017-07-21 RX ADMIN — POTASSIUM CHLORIDE 40 MEQ: 20 SOLUTION ORAL at 11:07

## 2017-07-21 RX ADMIN — PIPERACILLIN AND TAZOBACTAM 4.5 G: 4; .5 INJECTION, POWDER, LYOPHILIZED, FOR SOLUTION INTRAVENOUS; PARENTERAL at 07:07

## 2017-07-21 RX ADMIN — AMLODIPINE BESYLATE 5 MG: 5 TABLET ORAL at 09:07

## 2017-07-21 RX ADMIN — Medication 3 ML: at 09:07

## 2017-07-21 NOTE — PLAN OF CARE
Problem: Patient Care Overview  Goal: Plan of Care Review  Outcome: Ongoing (interventions implemented as appropriate)  POC reviewed with pt at 1400. Pt showed no evidence of understanding. Reassurance provided.  No acute events today. Pt progressing toward goals. Will continue to monitor. See flowsheets for full assessment and VS info.

## 2017-07-21 NOTE — PLAN OF CARE
Problem: Physical Therapy Goal  Goal: Physical Therapy Goal  Goals to be met by: 8/3/2017     Patient will increase functional independence with mobility by performin. Pt will participate in bilateral UE and LE ROM exercises on 3 consecutive visits with no change in vitals.   2. Pt will perform rolling R and L with moderate assistance  3. Supine to sit with moderate assistance   4. Sit to supine with moderate assistance   5.  Pt will tolerate sitting EOB x 10 minutes with moderate assistance            Goals remain appropriate.     Swapna Davis, PTA.  2017

## 2017-07-21 NOTE — SUBJECTIVE & OBJECTIVE
Interval History:  >4 elements OR status of 3 inpatient conditions  No issues. Bradycardic with Precedex.  Pending EEG report. CTA with no evidence of AVM. Gi consult. Repeat ERCP.   Review of Systems   Unable to perform ROS: Mental status change     2 systems OR Unable to obtain a complete ROS due to level of consciousness.  Objective:     Vitals:  Temp: 99.1 °F (37.3 °C) (07/20/17 1500)  Pulse: (!) 59 (07/20/17 1800)  Resp: 14 (07/20/17 1800)  BP: 129/65 (07/20/17 1800)  SpO2: 100 % (07/20/17 1800)    Temp:  [97.9 °F (36.6 °C)-99.6 °F (37.6 °C)] 99.1 °F (37.3 °C)  Pulse:  [39-79] 59  Resp:  [12-32] 14  SpO2:  [100 %] 100 %  BP: ()/(59-94) 129/65  Arterial Line BP: ()/(50-95) 111/78         Vent Mode: A/C  Oxygen Concentration (%):  [40] 40  Resp Rate Total:  [12 br/min-30 br/min] 12 br/min  Vt Set:  [400 mL-500 mL] 400 mL  PEEP/CPAP:  [5 cmH20] 5 cmH20  Pressure Support:  [0 cmH20] 0 cmH20  Mean Airway Pressure:  [8.4 [id number] cmH20] 8.6 cmH20    07/19 0701 - 07/20 0700  In: 1350 [I.V.:300]  Out: 640 [Urine:640]    Physical Exam  Unable to test orientation, language, memory, judgment, insight, fund of knowledge, hearing, shoulder shrug, tongue protrusion, coordination, gait due to level of consciousness.  General   HEENT: ETT, severe jaundiced.  Chest Heart RRR / Lungs Clear to auscultation  Abdomen: Soft nontender + BS  Extremities: OK distal pulses.  Skin: UK  Neurological Exam:  MS; Arousable and not following commands.  CN: II-XII R-gaze preference. RUMN VII  Motor: LUE  3 /5 / RUE 3/5  Tone normal bilaterally R-UE increased tone and contractures             LLE  3 /5 /  RLE  3/5  Tone normal bilaterally  Sensory: LT/PP/T/ Vibration Not assessed                 Complex sensory modalities: not tested  DTR:  normal throughout.  Coordination /Fine motor:   Gait: Not tested.  Meningeal signs: Absent  Medications:  Continuous  sodium chloride 0.9% Last Rate: 50 mL/hr at 07/20/17 1800   nicardipine  Last Rate: Stopped (07/20/17 1400)   Scheduled  [START ON 7/21/2017] amlodipine 5 mg Daily   chlorhexidine 15 mL BID   famotidine 20 mg BID   lactulose 10 g TID   levetiracetam in NaCl (iso-os) 500 mg Q12H   levothyroxine 25 mcg Before breakfast   piperacillin-tazobactam 4.5 g in dextrose 5 % 100 mL IVPB (ready to mix system) 4.5 g Q8H   sodium chloride 0.9% 3 mL Q8H   vancomycin (VANCOCIN) IVPB 750 mg Q12H   PRN  fentaNYL 25 mcg Q2H PRN   magnesium oxide 800 mg PRN   magnesium oxide 800 mg PRN   potassium chloride 10% 40 mEq PRN   potassium chloride 10% 40 mEq PRN   potassium chloride 10% 60 mEq PRN   potassium, sodium phosphates 2 packet PRN   potassium, sodium phosphates 2 packet PRN   potassium, sodium phosphates 2 packet PRN   buffered 2% sodium acetate 86meq, sodium chloride 86meq, sterile water for inj IV soln  Q6H PRN     Today I personally reviewed pertinent medications, lines/drains/airways, imaging, cardiology, lab results, microbiology results, notably:

## 2017-07-21 NOTE — PROGRESS NOTES
[facility]  Neurosurgery  Progress Note    Subjective:     History of Present Illness: Mrs.. Robles is a 57 y.o.  F with PMHx of HTN, CAD, hypothyroidism, and Hep C with with elevated liver enzymes.  Transferred from [facility] today for evaluation of ICH.   Pt was admitted for Liver issues and hyperbilirubinemia.  She was found to have a common bile duct stone and was scheduled for ERCP today, she has change in MS today, HCT revealed right temporal ICH.  She was intubated upon arrival at ED.  No family present during examination.  History obtained from chart.  Intitla presenting INR on 7/12 was 4.9, today INR is 1.0. Pt on aspirin and plavix at home, has reportedly been held x 7 days.        Post-Op Info:  * No surgery found *         Interval History: NAEON. Remains intubated.    Medications:  Continuous Infusions:   sodium chloride 0.9% 50 mL/hr at 07/21/17 1300    nicardipine Stopped (07/20/17 1400)     Scheduled Meds:   amlodipine  5 mg Per OG tube Daily    chlorhexidine  15 mL Mouth/Throat BID    famotidine  20 mg Per NG tube BID    hydrALAZINE        lactulose  10 g Per NG tube TID    levetiracetam in NaCl (iso-os)  500 mg Intravenous Q12H    levothyroxine  25 mcg Per OG tube Before breakfast    piperacillin-tazobactam 4.5 g in dextrose 5 % 100 mL IVPB (ready to mix system)  4.5 g Intravenous Q8H    sodium chloride 0.9%  3 mL Intravenous Q8H    vancomycin (VANCOCIN) IVPB  750 mg Intravenous Q12H     PRN Meds:fentaNYL, hydrALAZINE, magnesium oxide, magnesium oxide, potassium chloride 10%, potassium chloride 10%, potassium chloride 10%, potassium, sodium phosphates, potassium, sodium phosphates, potassium, sodium phosphates, buffered 2% sodium acetate 86meq, sodium chloride 86meq, sterile water for inj IV soln     Review of Systems  Objective:     Weight: 54.7 kg (120 lb 9.5 oz)  Body mass index is 20.7 kg/m².  Vital Signs (Most Recent):  Temp: 98.6 °F (37 °C) (07/21/17  1100)  Pulse: 71 (07/21/17 1300)  Resp: 14 (07/21/17 1300)  BP: 120/77 (07/21/17 1300)  SpO2: 100 % (07/21/17 1300) Vital Signs (24h Range):  Temp:  [98.4 °F (36.9 °C)-99.3 °F (37.4 °C)] 98.6 °F (37 °C)  Pulse:  [55-80] 71  Resp:  [12-48] 14  SpO2:  [100 %] 100 %  BP: (104-161)/(59-88) 120/77  Arterial Line BP: ()/() 115/65       Date 07/21/17 0700 - 07/22/17 0659   Shift 7229-4567 2381-3403 7023-7404 24 Hour Total   I  N  T  A  K  E   I.V.  (mL/kg) 2135  (39)   2135  (39)    NG/   180    IV Piggyback 450   450    Shift Total  (mL/kg) 2765  (50.5)   2765  (50.5)   O  U  T  P  U  T   Urine  (mL/kg/hr) 1425   1425    Shift Total  (mL/kg) 1425  (26.1)   1425  (26.1)   Weight (kg) 54.7 54.7 54.7 54.7              Vent Mode: A/C  Oxygen Concentration (%):  [40] 40  Resp Rate Total:  [12 br/min-24 br/min] 12 br/min  Vt Set:  [400 mL] 400 mL  PEEP/CPAP:  [5 cmH20] 5 cmH20  Pressure Support:  [0 cmH20] 0 cmH20  Mean Airway Pressure:  [8.4 udD97-80 cmH20] 9.6 cmH20         NG/OG Tube 07/19/17 1412 orogastric 18 Fr. Right mouth (Active)   Placement Check placement verified by x-ray;placement verified by aspirate characteristics;placement verified by distal tube length measurement 7/21/2017 11:00 AM   Distal Tube Length (cm) 60 7/21/2017 11:00 AM   Clamp Status/Tolerance clamped 7/21/2017 11:00 AM   Insertion Site Appearance no redness, warmth, tenderness, skin breakdown, drainage 7/21/2017 11:00 AM   Drainage None 7/21/2017 11:00 AM   Flush/Irrigation flushed w/;water;no resistance met 7/21/2017 11:00 AM   Current Rate (mL/hr) 0 mL/hr 7/21/2017  3:05 AM   Intake (mL) 80 mL 7/21/2017 11:00 AM   Residual Amount (ml) 0 ml 7/21/2017 11:00 AM            Rectal Tube 07/21/17 0200 fecal management system (Active)   Balloon Inflation Volume (mL) 45 7/21/2017 11:00 AM   Reposition drainage bags for BMS & Kay on opposite sides of bed 7/21/2017 11:00 AM   Outcome gas expelled, stool evacuated 7/21/2017 11:00 AM  "  Stool Color Brown 7/21/2017 11:00 AM   Insertion Site Appearance no redness, warmth, tenderness, skin breakdown, drainage 7/21/2017 11:00 AM   Flush/Irrigation flushed w/;water;no resistance met 7/21/2017  2:00 AM   Intake (mL) 30 mL 7/21/2017  2:00 AM            Urethral Catheter 07/19/17 1409 Double-lumen 16 Fr. (Active)   Site Assessment Clean;Intact 7/21/2017 11:00 AM   Collection Container Urimeter 7/21/2017 11:00 AM   Securement Method secured to top of thigh w/ adhesive device 7/21/2017 11:00 AM   Catheter Care Performed yes 7/21/2017 11:00 AM   Reason for Continuing Urinary Catheterization Urinary retention 7/21/2017 11:00 AM   CAUTI Prevention Bundle StatLock in place w 1" slack 7/21/2017  7:01 AM   Output (mL) 160 mL 7/21/2017  1:00 PM       Neurosurgery Physical Exam    [id number]  Pupils 2mm, reactive bilaterally  FCx4 with good strength  Skin is jaundiced    Significant Labs:    Recent Labs  Lab 07/20/17  0051 07/20/17 0223 07/20/17  1613 07/21/17  0013 07/21/17  0324 07/21/17  0546 07/21/17  1011   *  --  106  --   --   --  88  --   --    *  135*  --  134*  < > 136 136 138 136 138   K 2.8*  --  2.9*  < > 4.3  --  3.7  --  3.6     --  100  --   --   --  105  --   --    CO2 24  --  24  --   --   --  24  --   --    BUN 9  --  10  --   --   --  7  --   --    CREATININE 0.8  --  0.8  --   --   --  0.7  --   --    CALCIUM 8.9  --  8.8  --   --   --  8.2*  --   --    MG  --   < > 1.3*  --  1.7 1.9 1.9 2.0  --    < > = values in this interval not displayed.    Recent Labs  Lab 07/20/17 0051 07/20/17 0223 07/21/17  0324   WBC 11.67 11.64 12.28   HGB 9.5* 9.3* 8.0*   HCT 27.4* 27.0* 23.8*    154 209       Recent Labs  Lab 07/19/17  1449 07/20/17  0223 07/21/17  0324   INR 1.0 1.1 1.0     Microbiology Results (last 7 days)     Procedure Component Value Units Date/Time    Clostridium difficile EIA [[id number]]     Order Status:  Canceled Specimen:  Stool from Stool     Blood culture " [[id number]] Collected:  07/19/17 1455    Order Status:  Completed Specimen:  Blood from Peripheral, Forearm, Left Updated:  07/20/17 1812     Blood Culture, Routine No Growth to date     Blood Culture, Routine No Growth to date    Narrative:       Blood cultures from 2 different sites. 4 bottles total.  Please draw before starting antibiotics.    Blood culture [[id number]] Collected:  07/19/17 1505    Order Status:  Completed Specimen:  Blood from Peripheral, Hand, Left Updated:  07/20/17 1812     Blood Culture, Routine No Growth to date     Blood Culture, Routine No Growth to date    Narrative:       Blood cultures x 2 different sites. 4 bottles total. Please  draw cultures before administering antibiotics.    Culture, Respiratory with Gram Stain [[id number]]     Order Status:  No result Specimen:  Respiratory           Significant Diagnostics:  CT: Ct Head Without Contrast    Result Date: 7/20/2017   Acute right intracranial hemorrhage stable to slightly decreased in size in exam with persistent edema and localized mass effect. No hydrocephalus or midline shift. No new focus of hemorrhage identified. Age advanced senescent changes. Remote left cerebral hemisphere, right basal ganglia, and left cerebellar infarcts. Sinus disease. ______________________________________ Electronically signed by resident: DIAZ MONTELONGO MD Date:     07/20/17 Time:    21:51 As the supervising and teaching physician, I personally reviewed the images and resident's interpretation and I agree with the findings. Electronically signed by: JOHNNIE DEOUARD MD Date:     07/20/17 Time:    22:03     Assessment/Plan:     * Nontraumatic intracerebral hemorrhage in hemisphere, unspecified    56 yo F with non traumatic right temporal ICH    - CTA negative.  - Strict BP control, SBP < 160  - Keppra for Sz prophylaxis   - Medical management per NCC.  - No further neurosurgical needs at this time, will sign off, please call with any questions.              Ed Quigley MD  Neurosurgery  [facility]

## 2017-07-21 NOTE — ASSESSMENT & PLAN NOTE
Patient with history of hep C and with current bile duct stone, had stent placed at OSH  GI following

## 2017-07-21 NOTE — ASSESSMENT & PLAN NOTE
57 y.o. female PMH hepatitis C with transaminitis, CAD, thyroid disease, and h/o CVA on ASA, plavix. CT with R temporal ICH. CTA without evidence of etiology and ECHO EF 60, normal atrial size. Pending MRI.      Antiplatelets: None - hemorrhage  Statins: None - hemorrhage - though consider statin as LDL is 342  SBP < 140  Diagnostics: MRI brain. Pending EEG  DVT ppx: TEDs, SCDs  PT/OT and speech to evaluate and treat  Neuro checks qh

## 2017-07-21 NOTE — SUBJECTIVE & OBJECTIVE
Neurologic Chief Complaint: R temporal ICH    Subjective:     Interval History: Patient is seen for follow-up neurological assessment and treatment recommendations: NAEON, remains intubated, not sedated    HPI, Past Medical, Family, and Social History remains the same as documented in the initial encounter.     Review of Systems   Constitutional: Negative for fever.   Respiratory:        Intubated   Cardiovascular: Negative for leg swelling.   Gastrointestinal: Negative for vomiting.   Skin: Positive for color change.   Neurological: Positive for speech difficulty.   Psychiatric/Behavioral: Negative for agitation.     Scheduled Meds:   amlodipine  5 mg Per OG tube Daily    chlorhexidine  15 mL Mouth/Throat BID    famotidine  20 mg Per NG tube BID    lactulose  10 g Per NG tube TID    levetiracetam in NaCl (iso-os)  500 mg Intravenous Q12H    levothyroxine  25 mcg Per OG tube Before breakfast    piperacillin-tazobactam 4.5 g in dextrose 5 % 100 mL IVPB (ready to mix system)  4.5 g Intravenous Q8H    sodium chloride 0.9%  3 mL Intravenous Q8H    vancomycin (VANCOCIN) IVPB  750 mg Intravenous Q12H     Continuous Infusions:   sodium chloride 0.9% 50 mL/hr at 07/21/17 1400    nicardipine Stopped (07/20/17 1400)     PRN Meds:fentaNYL, hydrALAZINE, magnesium oxide, magnesium oxide, potassium chloride 10%, potassium chloride 10%, potassium chloride 10%, potassium, sodium phosphates, potassium, sodium phosphates, potassium, sodium phosphates, buffered 2% sodium acetate 86meq, sodium chloride 86meq, sterile water for inj IV soln    Objective:     Vital Signs (Most Recent):  Temp: 98.6 °F (37 °C) (07/21/17 1100)  Pulse: 64 (07/21/17 1400)  Resp: 12 (07/21/17 1400)  BP: 130/74 (07/21/17 1400)  SpO2: 100 % (07/21/17 1400)  BP Location: Right arm    Vital Signs Range (Last 24H):  Temp:  [98.4 °F (36.9 °C)-99.3 °F (37.4 °C)]   Pulse:  [55-80]   Resp:  [12-48]   BP: (110-161)/(59-88)   SpO2:  [100 %]   Arterial Line BP:  ()/()   BP Location: Right arm    Physical Exam   Constitutional: She appears well-developed and well-nourished. No distress.   HENT:   Head: Normocephalic and atraumatic.   Eyes: EOM are normal. Pupils are equal, round, and reactive to light.   Scleral icterus   Cardiovascular: Bradycardia present.    Pulmonary/Chest:   intubated   Abdominal: Soft.   Neurological:   Drowsy, does not follow commands   Skin: Skin is warm and dry.   Jaundice skin       Neurological Exam:   LOC: drowsy, intubated  Language: Intubated  Speech: Intubated  Orientation: Intubated  Pupils (CN III, IV, VI): PERRL  Motor*: moves all extremities spontaneously, strength 3/5 in all 4 limbs; does not move to command.   Increased tone bilaterally     NIH Stroke Scale:    Level of Consciousness: 1 - drowsy  LOC Questions: 2 - answers none correctly  LOC Commands: 2 - performs neither correctly  Best Gaze: 0 - normal  Visual: 0 - no visual loss  Facial Palsy: 0 - normal  Motor Left Arm: 2 - can't resist gravity  Motor Right Arm: 2 - can't resist gravity  Motor Left Le - can't resist gravity  Motor Right Le - can't resist gravity  Limb Ataxia: 0 - absent  Sensory: 0 - normal  Best Language: 3 - mute  Dysarthria: UN - intubated or other barrier  Extinction and Inattention: 0 - no neglect  NIH Stroke Scale Total: 16      Laboratory:  CMP:   Recent Labs  Lab 17  0324  17  1011   CALCIUM 8.2*  --   --    ALBUMIN 2.2*  --   --    PROT 6.2  --   --      < > 138   K 3.7  --  3.6   CO2 24  --   --      --   --    BUN 7  --   --    CREATININE 0.7  --   --    ALKPHOS 389*  --   --    *  --   --    AST 77*  --   --    BILITOT 6.0*  --   --    < > = values in this interval not displayed.  CBC:     Recent Labs  Lab 17  0324   WBC 12.28   RBC 2.54*   HGB 8.0*   HCT 23.8*      MCV 94   MCH 31.5*   MCHC 33.6     Lipid Panel:     Recent Labs  Lab 17  1449   CHOL 420*   LDLCALC 342.0*   HDL 11*    TRIG 335*     Coagulation:   Recent Labs  Lab 07/18/17  2212  07/21/17  0324   INR 1.0  < > 1.0   APTT 25.3  --   --    < > = values in this interval not displayed.  Platelet Aggregation Study: No results for input(s): PLTAGG, PLTAGINTERP, PLTAGREGLACO, ADPPLTAGGREG in the last 168 hours.  Hgb A1C:     Recent Labs  Lab 07/19/17  1449   HGBA1C 4.6     TSH:     Recent Labs  Lab 07/19/17  1449   TSH 3.018       Diagnostic Results:  I have personally reviewed:   Findings:     CT Head. Date: 07/19/17  There are CT findings of a large, acute intracranial hemorrhage involving the medial right temporal lobe with evidence of mass effect as described above.    CTA Head/Neck. Date: 07/19/17  Acute right temporal intraparenchymal hemorrhage grossly stable to the prior exam with persistent edema and localized mass effect. No significant hydrocephalus or midline shift.    Scattered atherosclerosis of the bilateral ICAs resulting in 50% stenosis of the right cavernous ICA. No large vessel occlusion or aneurysm identified.    Age advanced senescent changes.    Remote right basal ganglia and left cerebral and cerebellar infarcts, unchanged from examination performed earlier today.

## 2017-07-21 NOTE — PLAN OF CARE
Problem: Patient Care Overview  Goal: Plan of Care Review  POC reviewed with pt at 0500. Pt is nonverbal and unable to verbalize understanding. No acute events overnight. 2x PRN hydralazine doses were administered per order. Potassium and phos were replaced per PRN orders. Flexi was inserted overnight after second liquidity BM.  Pt progressing toward goals. Will continue to monitor. See flowsheets for full assessment and VS info.

## 2017-07-21 NOTE — PROCEDURES
"Malina Robles is a 57 y.o. female patient.    Temp: 98.8 °F (37.1 °C) (07/21/17 1500)  Pulse: 60 (07/21/17 1706)  Resp: (!) 26 (07/21/17 1706)  BP: (!) 155/84 (07/21/17 1706)  SpO2: 100 % (07/21/17 1706)  Weight: 54.7 kg (120 lb 9.5 oz) (07/21/17 0400)  Height: 5' 4" (162.6 cm) (07/20/17 0140)    PICC  Date/Time: 7/21/2017 5:11 PM  Performed by: NICHO ROMERO  Consent Done: Yes  Time out: Immediately prior to procedure a time out was called to verify the correct patient, procedure, equipment, support staff and site/side marked as required  Indications: med administration and vascular access  Anesthesia: local infiltration  Local anesthetic: lidocaine 1% without epinephrine  Anesthetic Total (mL): 3  Preparation: skin prepped with ChloraPrep  Skin prep agent dried: skin prep agent completely dried prior to procedure  Sterile barriers: all five maximum sterile barriers used - cap, mask, sterile gown, sterile gloves, and large sterile sheet  Hand hygiene: hand hygiene performed prior to central venous catheter insertion  Location details: right brachial  Catheter type: double lumen  Catheter size: 5 Fr  Catheter Length: 32cm    Ultrasound guidance: yes  Vessel Caliber: medium and patent, compressibility normal  Vascular Doppler: not done  Needle advanced into vessel with real time Ultrasound guidance.  Guidewire confirmed in vessel.  Image recorded and saved.  Sterile sheath used.  no esophageal manometryNumber of attempts: 1  Post-procedure: blood return through all ports, chlorhexidine patch and sterile dressing applied  Specimens: No  Implants: No  Assessment: placement verified by x-ray  Complications: none        Laura Adams  7/21/2017  "

## 2017-07-21 NOTE — ASSESSMENT & PLAN NOTE
56 yo F with non traumatic right temporal ICH    - CTA negative.  - Strict BP control, SBP < 160  - Keppra for Sz prophylaxis   - Medical management per NCC.  - No further neurosurgical needs at this time, will sign off, please call with any questions.

## 2017-07-21 NOTE — CONSULTS
Spoke to legal about consent, stated if PICC in necessary for care that 2 physicians can sign for procedure since pt unable to consent self and no other contacts can be notified.

## 2017-07-21 NOTE — PROGRESS NOTES
[facility]  Neurocritical Care  Progress Note    Admit Date: 7/19/2017  Service Date: 07/20/2017  Length of Stay: 0    Subjective:     Chief Complaint: Nontraumatic intracerebral hemorrhage in hemisphere, unspecified    History of Present Illness: Ms. Malina Robles is 57 year old female with a PMHx of prior stroke,  Asthma, coronary artery disease (S/P Cardiac Surgery), Depression, Emphysema, GERD, Hypertension, thyroid disease, with recent Biliary Stent 7/18,  who presents to Neuro Critical Care as a transfer from [facility]/ Right Temporal ICH. Per the chart,  she was [facility] on 7[facility]17 with altered mental status and increase jaundice upon admission PT-INR 4.9, , , Ammonia <9 on 7/12. She was on Plavix prior to admit for coronary artery disease which was upon admission.CT of abdomen on 7/13/17 showed 2cm stone distal common duct resulting in moderate biliary ductal dilatation and 3cm infrarenal abdominal aortic aneurysm. On 7/14 Urine culture positive for gram negative rods. She is being treated with IV Ceftriaxone.  7/15 liver were enzymes worsening.   On 7/18/17 GI attempted  ERCP, but could not get stone out. Was able to place stent though with only old bile noted behind the stone. he was started on nicardipine gtt for goal  to 140. Her GCS was 9 to 10, and she was intubated for airway protection prior to arrival at [facility]. She lives at [facility]. No family at bedside. Patient will be admitted to Neuro Critical Care for a higher level of care.            Hospital Course: 7/19 admitted to Neuro Critical Care as a transfer from [facility] Right Temporal ICH 4.2 cm ; recent Biliary Stent 7/18 at outside hospital  Cultured today, consulted GI for repeat of ERCP; started abx, pending CTA  7/20: No issues. Bradycardic with Precedex.  Pending EEG report. CTA with no evidence of AVM.  Gi consult. Repeat ERCP.     Interval History:  >4 elements OR status of 3 inpatient conditions  No issues. Bradycardic with Precedex.  Pending EEG report. CTA with no evidence of AVM. Gi consult. Repeat ERCP.   Review of Systems   Unable to perform ROS: Mental status change     2 systems OR Unable to obtain a complete ROS due to level of consciousness.  Objective:     Vitals:  Temp: 99.1 °F (37.3 °C) (07/20/17 1500)  Pulse: (!) 59 (07/20/17 1800)  Resp: 14 (07/20/17 1800)  BP: 129/65 (07/20/17 1800)  SpO2: 100 % (07/20/17 1800)    Temp:  [97.9 °F (36.6 °C)-99.6 °F (37.6 °C)] 99.1 °F (37.3 °C)  Pulse:  [39-79] 59  Resp:  [12-32] 14  SpO2:  [100 %] 100 %  BP: ()/(59-94) 129/65  Arterial Line BP: ()/(50-95) 111/78         Vent Mode: A/C  Oxygen Concentration (%):  [40] 40  Resp Rate Total:  [12 br/min-30 br/min] 12 br/min  Vt Set:  [400 mL-500 mL] 400 mL  PEEP/CPAP:  [5 cmH20] 5 cmH20  Pressure Support:  [0 cmH20] 0 cmH20  Mean Airway Pressure:  [8.4 wyZ86-70 cmH20] 8.6 cmH20    07/19 0701 - 07/20 0700  In: 1350 [I.V.:300]  Out: 640 [Urine:640]    Physical Exam  Unable to test orientation, language, memory, judgment, insight, fund of knowledge, hearing, shoulder shrug, tongue protrusion, coordination, gait due to level of consciousness.  General   HEENT: ETT, severe jaundiced.  Chest Heart RRR / Lungs Clear to auscultation  Abdomen: Soft nontender + BS  Extremities: OK distal pulses.  Skin: UK  Neurological Exam:  MS; Arousable and not following commands.  CN: II-XII R-gaze preference. RUMN VII  Motor: LUE  3 /5 / RUE 3/5  Tone normal bilaterally R-UE increased tone and contractures             LLE  3 /5 /  RLE  3/5  Tone normal bilaterally  Sensory: LT/PP/T/ Vibration Not assessed                 Complex sensory modalities: not tested  DTR:  normal throughout.  Coordination /Fine motor:   Gait: Not tested.  Meningeal signs: Absent  Medications:  Continuous  sodium chloride 0.9% Last Rate: 50 mL/hr at  07/20/17 1800   nicardipine Last Rate: Stopped (07/20/17 1400)   Scheduled  [START ON 7/21/2017] amlodipine 5 mg Daily   chlorhexidine 15 mL BID   famotidine 20 mg BID   lactulose 10 g TID   levetiracetam in NaCl (iso-os) 500 mg Q12H   levothyroxine 25 mcg Before breakfast   piperacillin-tazobactam 4.5 g in dextrose 5 % 100 mL IVPB (ready to mix system) 4.5 g Q8H   sodium chloride 0.9% 3 mL Q8H   vancomycin (VANCOCIN) IVPB 750 mg Q12H   PRN  fentaNYL 25 mcg Q2H PRN   magnesium oxide 800 mg PRN   magnesium oxide 800 mg PRN   potassium chloride 10% 40 mEq PRN   potassium chloride 10% 40 mEq PRN   potassium chloride 10% 60 mEq PRN   potassium, sodium phosphates 2 packet PRN   potassium, sodium phosphates 2 packet PRN   potassium, sodium phosphates 2 packet PRN   buffered 2% sodium acetate 86meq, sodium chloride 86meq, sterile water for inj IV soln  Q6H PRN     Today I personally reviewed pertinent medications, lines/drains/airways, imaging, cardiology, lab results, microbiology results, notably:      Assessment/Plan:     No new Assessment & Plan notes have been filed under this hospital service since the last note was generated.  Service: Neuro Critical Care      Active Problem List:   1.R-temporal  ICH spontaneus  2.HTN  3. Biliary obstruction. Pending ERCP. Prior biliary stent.  4. CAD  5. GERD  6. Hx thyroid disease.  7. Prior L-HIGINIO/ MCA stroke.     Assessment / Plan:  R/O AVM related bleeding .  Neuro: Target serum Na 145-150.  -HTS 2% 300 cc q 6hrs PRN for serum Na < 145.  -Fentanyl 25-50mcg IV PRN.  -D/C propofol.  -Keppra 500mg q 12hrs x 7 days.  -Keep normothermic and normoglycemic  -Head of the bed at 30 degrees.  -Hourly pupillometry.  -CT head at noon.  Resp:  -Vent Mode: A/C  Oxygen Concentration (%):  [] 100  Resp Rate Total:  [16 br/min-23 br/min] 16 br/min  Vt Set:  [400 mL] 400 mL  PEEP/CPAP:  [5 cmH20] 5 cmH20  Pressure Support:  [0 cmH20] 0 cmH20  Mean Airway Pressure:  [8 cmH20-9.5 cmH20] 8.7  cmH20   -VAP  CV: Keep SBP <=140 mmHg  -TTE  -Amlodipine 5mg qd.  -12 lead ECG  -Cardene IVD PRN.  -PICC line consult.  IVF/nutrition/Renal/GI: Cholelithiasis' Target serum Na 145-150.  -GI consult.  -NPO except meds.  -E-replacement protocol  -PICC line consult.  -LFTs , amylase, lipase, ammonia.  -OGT  -NS at 50cc /hr IVD  -Serum Na q 6hrs  -HTS 2% 300 cc q 6hrs PRN for serum Na < 145.  -Change HTS 3% boluses when PICC line placed.  -BR  Hem / ID: WBC 12 K.  -Coagulation OK  -Panculture  -UA  -Vanc /Zosyn start.  -Procalcitonin.  -CBC + diff.  Endo:  -Free T4 OK,.  -Levothroxine 0.025mg qd.  Prophylaxis:  -SCDs  -VAP  -Famotidine.  Advance Directives and Disposition:    Full Code. Keep in the  NICU.           Uninterrupted Critical Care/Counseling Time (directly spent today by me not including procedures 30-74 min (69291)): =   35 min    Activity Orders          None        Full Code    Enoch Colon MD  Neurocritical Care  [facility]

## 2017-07-21 NOTE — CONSULTS
Double lumen PICC placed in the right brachial vein of the RUE, 32cm in length with 0cm exposed and 23cm arm circumference. Lot#[id number].

## 2017-07-21 NOTE — PROGRESS NOTES
[facility]  Vascular Neurology  Comprehensive Stroke Center  Progress Note    Assessment/Plan:     7/19: AMS this AM. CT head shows R temporal ICH with IVH. [facility] [facility] [place] and [facility] to [facility]. Of note, patient's initial INR was 4.9.     07/20: remains intubated and on cardene, patient not sedated. See by GI for bile duct stone and plans to repeat ERCP. Patient with leukocytosis-cultures pending, on vanc/zosyn.   7/21/17 Remains intubated. CTA without abnormality to explain ICH.     * Nontraumatic intracerebral hemorrhage in hemisphere, unspecified    57 y.o. female PMH hepatitis C with transaminitis, CAD, thyroid disease, and h/o CVA on ASA, plavix. CT with R temporal ICH. CTA without evidence of etiology and ECHO EF 60, normal atrial size. Pending MRI.      Antiplatelets: None - hemorrhage  Statins: None - hemorrhage - though consider statin as LDL is 342  SBP < 140  Diagnostics: MRI brain. Pending EEG  DVT ppx: TEDs, SCDs  PT/OT and speech to evaluate and treat  Neuro checks qh        Vasogenic cerebral edema    2/2 ICH  Evident on imaging        Essential hypertension    - Stroke risk factor.  - Management per primary team  - SBP<140 in setting of bleed        Elevated liver enzymes    AST/ALT improving 77/124 now  Management per primary team  INR 4.9 previously; now normal at 1.1        Jaundice    Patient with history of hep C and with current bile duct stone, had stent placed at OSH  GI following            Neurologic Chief Complaint: R temporal ICH    Subjective:     Interval History: Patient is seen for follow-up neurological assessment and treatment recommendations: NAEON, remains intubated, not sedated    HPI, Past Medical, Family, and Social History remains the same as documented in the initial encounter.     Review of Systems   Constitutional: Negative for fever.   Respiratory:        Intubated   Cardiovascular: Negative for leg swelling.   Gastrointestinal: Negative for  vomiting.   Skin: Positive for color change.   Neurological: Positive for speech difficulty.   Psychiatric/Behavioral: Negative for agitation.     Scheduled Meds:   amlodipine  5 mg Per OG tube Daily    chlorhexidine  15 mL Mouth/Throat BID    famotidine  20 mg Per NG tube BID    lactulose  10 g Per NG tube TID    levetiracetam in NaCl (iso-os)  500 mg Intravenous Q12H    levothyroxine  25 mcg Per OG tube Before breakfast    piperacillin-tazobactam 4.5 g in dextrose 5 % 100 mL IVPB (ready to mix system)  4.5 g Intravenous Q8H    sodium chloride 0.9%  3 mL Intravenous Q8H    vancomycin (VANCOCIN) IVPB  750 mg Intravenous Q12H     Continuous Infusions:   sodium chloride 0.9% 50 mL/hr at 07/21/17 1400    nicardipine Stopped (07/20/17 1400)     PRN Meds:fentaNYL, hydrALAZINE, magnesium oxide, magnesium oxide, potassium chloride 10%, potassium chloride 10%, potassium chloride 10%, potassium, sodium phosphates, potassium, sodium phosphates, potassium, sodium phosphates, buffered 2% sodium acetate 86meq, sodium chloride 86meq, sterile water for inj IV soln    Objective:     Vital Signs (Most Recent):  Temp: 98.6 °F (37 °C) (07/21/17 1100)  Pulse: 64 (07/21/17 1400)  Resp: 12 (07/21/17 1400)  BP: 130/74 (07/21/17 1400)  SpO2: 100 % (07/21/17 1400)  BP Location: Right arm    Vital Signs Range (Last 24H):  Temp:  [98.4 °F (36.9 °C)-99.3 °F (37.4 °C)]   Pulse:  [55-80]   Resp:  [12-48]   BP: (110-161)/(59-88)   SpO2:  [100 %]   Arterial Line BP: ()/()   BP Location: Right arm    Physical Exam   Constitutional: She appears well-developed and well-nourished. No distress.   HENT:   Head: Normocephalic and atraumatic.   Eyes: EOM are normal. Pupils are equal, round, and reactive to light.   Scleral icterus   Cardiovascular: Bradycardia present.    Pulmonary/Chest:   intubated   Abdominal: Soft.   Neurological:   Drowsy, does not follow commands   Skin: Skin is warm and dry.   Jaundice skin       Neurological  Exam:   LOC: drowsy, intubated  Language: Intubated  Speech: Intubated  Orientation: Intubated  Pupils (CN III, IV, VI): PERRL  Motor*: moves all extremities spontaneously, strength 3/5 in all 4 limbs; does not move to command.   Increased tone bilaterally     NIH Stroke Scale:    Level of Consciousness: 1 - drowsy  LOC Questions: 2 - answers none correctly  LOC Commands: 2 - performs neither correctly  Best Gaze: 0 - normal  Visual: 0 - no visual loss  Facial Palsy: 0 - normal  Motor Left Arm: 2 - can't resist gravity  Motor Right Arm: 2 - can't resist gravity  Motor Left Le - can't resist gravity  Motor Right Le - can't resist gravity  Limb Ataxia: 0 - absent  Sensory: 0 - normal  Best Language: 3 - mute  Dysarthria: UN - intubated or other barrier  Extinction and Inattention: 0 - no neglect  NIH Stroke Scale Total: 16      Laboratory:  CMP:   Recent Labs  Lab 17  0324  17  1011   CALCIUM 8.2*  --   --    ALBUMIN 2.2*  --   --    PROT 6.2  --   --      < > 138   K 3.7  --  3.6   CO2 24  --   --      --   --    BUN 7  --   --    CREATININE 0.7  --   --    ALKPHOS 389*  --   --    *  --   --    AST 77*  --   --    BILITOT 6.0*  --   --    < > = values in this interval not displayed.  CBC:     Recent Labs  Lab 17  0324   WBC 12.28   RBC 2.54*   HGB 8.0*   HCT 23.8*      MCV 94   MCH 31.5*   MCHC 33.6     Lipid Panel:     Recent Labs  Lab 17  1449   CHOL 420*   LDLCALC 342.0*   HDL 11*   TRIG 335*     Coagulation:   Recent Labs  Lab 17  2212  17  0324   INR 1.0  < > 1.0   APTT 25.3  --   --    < > = values in this interval not displayed.  Platelet Aggregation Study: No results for input(s): PLTAGG, PLTAGINTERP, PLTAGREGLACO, ADPPLTAGGREG in the last 168 hours.  Hgb A1C:     Recent Labs  Lab 17  1449   HGBA1C 4.6     TSH:     Recent Labs  Lab 17  1449   TSH 3.018       Diagnostic Results:  I have personally reviewed:   Findings:     CT  Head. Date: 07/19/17  There are CT findings of a large, acute intracranial hemorrhage involving the medial right temporal lobe with evidence of mass effect as described above.    CTA Head/Neck. Date: 07/19/17  Acute right temporal intraparenchymal hemorrhage grossly stable to the prior exam with persistent edema and localized mass effect. No significant hydrocephalus or midline shift.    Scattered atherosclerosis of the bilateral ICAs resulting in 50% stenosis of the right cavernous ICA. No large vessel occlusion or aneurysm identified.    Age advanced senescent changes.    Remote right basal ganglia and left cerebral and cerebellar infarcts, unchanged from examination performed earlier today.      Laura Aggarwal PA-C  Comprehensive Stroke Center  Department of Vascular Neurology   [facility]

## 2017-07-21 NOTE — SUBJECTIVE & OBJECTIVE
Interval History: NAEON. Remains intubated.    Medications:  Continuous Infusions:   sodium chloride 0.9% 50 mL/hr at 07/21/17 1300    nicardipine Stopped (07/20/17 1400)     Scheduled Meds:   amlodipine  5 mg Per OG tube Daily    chlorhexidine  15 mL Mouth/Throat BID    famotidine  20 mg Per NG tube BID    hydrALAZINE        lactulose  10 g Per NG tube TID    levetiracetam in NaCl (iso-os)  500 mg Intravenous Q12H    levothyroxine  25 mcg Per OG tube Before breakfast    piperacillin-tazobactam 4.5 g in dextrose 5 % 100 mL IVPB (ready to mix system)  4.5 g Intravenous Q8H    sodium chloride 0.9%  3 mL Intravenous Q8H    vancomycin (VANCOCIN) IVPB  750 mg Intravenous Q12H     PRN Meds:fentaNYL, hydrALAZINE, magnesium oxide, magnesium oxide, potassium chloride 10%, potassium chloride 10%, potassium chloride 10%, potassium, sodium phosphates, potassium, sodium phosphates, potassium, sodium phosphates, buffered 2% sodium acetate 86meq, sodium chloride 86meq, sterile water for inj IV soln     Review of Systems  Objective:     Weight: 54.7 kg (120 lb 9.5 oz)  Body mass index is 20.7 kg/m².  Vital Signs (Most Recent):  Temp: 98.6 °F (37 °C) (07/21/17 1100)  Pulse: 71 (07/21/17 1300)  Resp: 14 (07/21/17 1300)  BP: 120/77 (07/21/17 1300)  SpO2: 100 % (07/21/17 1300) Vital Signs (24h Range):  Temp:  [98.4 °F (36.9 °C)-99.3 °F (37.4 °C)] 98.6 °F (37 °C)  Pulse:  [55-80] 71  Resp:  [12-48] 14  SpO2:  [100 %] 100 %  BP: (104-161)/(59-88) 120/77  Arterial Line BP: ()/() 115/65       Date 07/21/17 0700 - 07/22/17 0659   Shift 5591-2062 2841-3968 3490-6022 24 Hour Total   I  N  T  A  K  E   I.V.  (mL/kg) 2135  (39)   2135  (39)    NG/   180    IV Piggyback 450   450    Shift Total  (mL/kg) 2765  (50.5)   2765  (50.5)   O  U  T  P  U  T   Urine  (mL/kg/hr) 1425   1425    Shift Total  (mL/kg) 1425  (26.1)   1425  (26.1)   Weight (kg) 54.7 54.7 54.7 54.7              Vent Mode: A/C  Oxygen Concentration  "(%):  [40] 40  Resp Rate Total:  [12 br/min-24 br/min] 12 br/min  Vt Set:  [400 mL] 400 mL  PEEP/CPAP:  [5 cmH20] 5 cmH20  Pressure Support:  [0 cmH20] 0 cmH20  Mean Airway Pressure:  [8.4 fhJ49-07 cmH20] 9.6 cmH20         NG/OG Tube 07/19/17 1412 orogastric 18 Fr. Right mouth (Active)   Placement Check placement verified by x-ray;placement verified by aspirate characteristics;placement verified by distal tube length measurement 7/21/2017 11:00 AM   Distal Tube Length (cm) 60 7/21/2017 11:00 AM   Clamp Status/Tolerance clamped 7/21/2017 11:00 AM   Insertion Site Appearance no redness, warmth, tenderness, skin breakdown, drainage 7/21/2017 11:00 AM   Drainage None 7/21/2017 11:00 AM   Flush/Irrigation flushed w/;water;no resistance met 7/21/2017 11:00 AM   Current Rate (mL/hr) 0 mL/hr 7/21/2017  3:05 AM   Intake (mL) 80 mL 7/21/2017 11:00 AM   Residual Amount (ml) 0 ml 7/21/2017 11:00 AM            Rectal Tube 07/21/17 0200 fecal management system (Active)   Balloon Inflation Volume (mL) 45 7/21/2017 11:00 AM   Reposition drainage bags for BMS & Kay on opposite sides of bed 7/21/2017 11:00 AM   Outcome gas expelled, stool evacuated 7/21/2017 11:00 AM   Stool Color Brown 7/21/2017 11:00 AM   Insertion Site Appearance no redness, warmth, tenderness, skin breakdown, drainage 7/21/2017 11:00 AM   Flush/Irrigation flushed w/;water;no resistance met 7/21/2017  2:00 AM   Intake (mL) 30 mL 7/21/2017  2:00 AM            Urethral Catheter 07/19/17 1409 Double-lumen 16 Fr. (Active)   Site Assessment Clean;Intact 7/21/2017 11:00 AM   Collection Container Urimeter 7/21/2017 11:00 AM   Securement Method secured to top of thigh w/ adhesive device 7/21/2017 11:00 AM   Catheter Care Performed yes 7/21/2017 11:00 AM   Reason for Continuing Urinary Catheterization Urinary retention 7/21/2017 11:00 AM   CAUTI Prevention Bundle StatLock in place w 1" slack 7/21/2017  7:01 AM   Output (mL) 160 mL 7/21/2017  1:00 PM       Neurosurgery " Physical Exam    [id number]  Pupils 2mm, reactive bilaterally  FCx4 with good strength  Skin is jaundiced    Significant Labs:    Recent Labs  Lab 07/20/17  0051  07/20/17  0223  07/20/17  1613 07/21/17  0013 07/21/17  0324 07/21/17  0546 07/21/17  1011   *  --  106  --   --   --  88  --   --    *  135*  --  134*  < > 136 136 138 136 138   K 2.8*  --  2.9*  < > 4.3  --  3.7  --  3.6     --  100  --   --   --  105  --   --    CO2 24  --  24  --   --   --  24  --   --    BUN 9  --  10  --   --   --  7  --   --    CREATININE 0.8  --  0.8  --   --   --  0.7  --   --    CALCIUM 8.9  --  8.8  --   --   --  8.2*  --   --    MG  --   < > 1.3*  --  1.7 1.9 1.9 2.0  --    < > = values in this interval not displayed.    Recent Labs  Lab 07/20/17  0051 07/20/17  0223 07/21/17  0324   WBC 11.67 11.64 12.28   HGB 9.5* 9.3* 8.0*   HCT 27.4* 27.0* 23.8*    154 209       Recent Labs  Lab 07/19/17  1449 07/20/17  0223 07/21/17  0324   INR 1.0 1.1 1.0     Microbiology Results (last 7 days)     Procedure Component Value Units Date/Time    Clostridium difficile EIA [[id number]]     Order Status:  Canceled Specimen:  Stool from Stool     Blood culture [[id number]] Collected:  07/19/17 1455    Order Status:  Completed Specimen:  Blood from Peripheral, Forearm, Left Updated:  07/20/17 1812     Blood Culture, Routine No Growth to date     Blood Culture, Routine No Growth to date    Narrative:       Blood cultures from 2 different sites. 4 bottles total.  Please draw before starting antibiotics.    Blood culture [[id number]] Collected:  07/19/17 1505    Order Status:  Completed Specimen:  Blood from Peripheral, Hand, Left Updated:  07/20/17 1812     Blood Culture, Routine No Growth to date     Blood Culture, Routine No Growth to date    Narrative:       Blood cultures x 2 different sites. 4 bottles total. Please  draw cultures before administering antibiotics.    Culture, Respiratory with Gram Stain [[id number]]     Order  Status:  No result Specimen:  Respiratory           Significant Diagnostics:  CT: Ct Head Without Contrast    Result Date: 7/20/2017   Acute right intracranial hemorrhage stable to slightly decreased in size in exam with persistent edema and localized mass effect. No hydrocephalus or midline shift. No new focus of hemorrhage identified. Age advanced senescent changes. Remote left cerebral hemisphere, right basal ganglia, and left cerebellar infarcts. Sinus disease. ______________________________________ Electronically signed by resident: DIAZ MONTELONGO MD Date:     07/20/17 Time:    21:51 As the supervising and teaching physician, I personally reviewed the images and resident's interpretation and I agree with the findings. Electronically signed by: JOHNNIE EDOUARD MD Date:     07/20/17 Time:    22:03

## 2017-07-22 PROBLEM — D62 ACUTE BLOOD LOSS ANEMIA: Status: ACTIVE | Noted: 2017-07-22

## 2017-07-22 PROBLEM — E87.3 RESPIRATORY ALKALOSIS: Status: ACTIVE | Noted: 2017-07-22

## 2017-07-22 LAB
ABO + RH BLD: NORMAL
ALBUMIN SERPL BCP-MCNC: 2.1 G/DL
ALLENS TEST: ABNORMAL
ALLENS TEST: ABNORMAL
ALP SERPL-CCNC: 363 U/L
ALT SERPL W/O P-5'-P-CCNC: 98 U/L
AMMONIA PLAS-SCNC: 37 UMOL/L
AMMONIA PLAS-SCNC: 39 UMOL/L
AMYLASE SERPL-CCNC: 46 U/L
ANION GAP SERPL CALC-SCNC: 8 MMOL/L
ANISOCYTOSIS BLD QL SMEAR: SLIGHT
AST SERPL-CCNC: 53 U/L
BASOPHILS # BLD AUTO: ABNORMAL K/UL
BASOPHILS NFR BLD: 0 %
BILIRUB SERPL-MCNC: 5.2 MG/DL
BLD GP AB SCN CELLS X3 SERPL QL: NORMAL
BLD PROD TYP BPU: NORMAL
BLD PROD TYP BPU: NORMAL
BLOOD UNIT EXPIRATION DATE: NORMAL
BLOOD UNIT EXPIRATION DATE: NORMAL
BLOOD UNIT TYPE CODE: 5100
BLOOD UNIT TYPE CODE: 5100
BLOOD UNIT TYPE: NORMAL
BLOOD UNIT TYPE: NORMAL
BUN SERPL-MCNC: 4 MG/DL
CALCIUM SERPL-MCNC: 8.2 MG/DL
CHLORIDE SERPL-SCNC: 114 MMOL/L
CO2 SERPL-SCNC: 20 MMOL/L
CODING SYSTEM: NORMAL
CODING SYSTEM: NORMAL
CREAT SERPL-MCNC: 0.7 MG/DL
DELSYS: ABNORMAL
DELSYS: ABNORMAL
DIFFERENTIAL METHOD: ABNORMAL
DISPENSE STATUS: NORMAL
DISPENSE STATUS: NORMAL
EOSINOPHIL # BLD AUTO: ABNORMAL K/UL
EOSINOPHIL NFR BLD: 5 %
ERYTHROCYTE [DISTWIDTH] IN BLOOD BY AUTOMATED COUNT: 15.7 %
ERYTHROCYTE [SEDIMENTATION RATE] IN BLOOD BY WESTERGREN METHOD: 12 MM/H
ERYTHROCYTE [SEDIMENTATION RATE] IN BLOOD BY WESTERGREN METHOD: 29 MM/H
EST. GFR  (AFRICAN AMERICAN): >60 ML/MIN/1.73 M^2
EST. GFR  (NON AFRICAN AMERICAN): >60 ML/MIN/1.73 M^2
FIO2: 40
FIO2: 40
FLOW: 10
GLUCOSE SERPL-MCNC: 100 MG/DL
HCO3 UR-SCNC: 22 MMOL/L (ref 24–28)
HCO3 UR-SCNC: 24.9 MMOL/L (ref 24–28)
HCT VFR BLD AUTO: 22.3 %
HGB BLD-MCNC: 7.1 G/DL
HYPOCHROMIA BLD QL SMEAR: ABNORMAL
INR PPP: 1
LIPASE SERPL-CCNC: 55 U/L
LYMPHOCYTES # BLD AUTO: ABNORMAL K/UL
LYMPHOCYTES NFR BLD: 32 %
MAGNESIUM SERPL-MCNC: 1.8 MG/DL
MCH RBC QN AUTO: 30.5 PG
MCHC RBC AUTO-ENTMCNC: 31.8 G/DL
MCV RBC AUTO: 96 FL
MIN VOL: 4
MODE: ABNORMAL
MODE: ABNORMAL
MONOCYTES # BLD AUTO: ABNORMAL K/UL
MONOCYTES NFR BLD: 4 %
NEUTROPHILS NFR BLD: 59 %
PCO2 BLDA: 30.7 MMHG (ref 35–45)
PCO2 BLDA: 35.3 MMHG (ref 35–45)
PEEP: 5
PH SMN: 7.4 [PH] (ref 7.35–7.45)
PH SMN: 7.52 [PH] (ref 7.35–7.45)
PHOSPHATE SERPL-MCNC: 3.7 MG/DL
PIP: 20
PLATELET # BLD AUTO: 144 K/UL
PLATELET BLD QL SMEAR: ABNORMAL
PMV BLD AUTO: 9.1 FL
PO2 BLDA: 100 MMHG (ref 80–100)
PO2 BLDA: 192 MMHG (ref 80–100)
POC BE: -3 MMOL/L
POC BE: 2 MMOL/L
POC SATURATED O2: 100 % (ref 95–100)
POC SATURATED O2: 98 % (ref 95–100)
POC TCO2: 23 MMOL/L (ref 23–27)
POC TCO2: 26 MMOL/L (ref 23–27)
POCT GLUCOSE: 100 MG/DL (ref 70–110)
POCT GLUCOSE: 84 MG/DL (ref 70–110)
POCT GLUCOSE: 90 MG/DL (ref 70–110)
POTASSIUM SERPL-SCNC: 3.5 MMOL/L
POTASSIUM SERPL-SCNC: 4 MMOL/L
PROT SERPL-MCNC: 5.5 G/DL
PROTHROMBIN TIME: 10.7 SEC
RBC # BLD AUTO: 2.33 M/UL
SAMPLE: ABNORMAL
SAMPLE: ABNORMAL
SITE: ABNORMAL
SITE: ABNORMAL
SODIUM SERPL-SCNC: 136 MMOL/L
SODIUM SERPL-SCNC: 139 MMOL/L
SODIUM SERPL-SCNC: 140 MMOL/L
SODIUM SERPL-SCNC: 140 MMOL/L
SODIUM SERPL-SCNC: 142 MMOL/L
SP02: 100
SP02: 100
TRANS ERYTHROCYTES VOL PATIENT: NORMAL ML
TRANS ERYTHROCYTES VOL PATIENT: NORMAL ML
VT: 400
WBC # BLD AUTO: 11.11 K/UL

## 2017-07-22 PROCEDURE — 84132 ASSAY OF SERUM POTASSIUM: CPT

## 2017-07-22 PROCEDURE — 84295 ASSAY OF SERUM SODIUM: CPT | Mod: 91

## 2017-07-22 PROCEDURE — 99291 CRITICAL CARE FIRST HOUR: CPT | Mod: ,,, | Performed by: PSYCHIATRY & NEUROLOGY

## 2017-07-22 PROCEDURE — 97530 THERAPEUTIC ACTIVITIES: CPT

## 2017-07-22 PROCEDURE — 37799 UNLISTED PX VASCULAR SURGERY: CPT

## 2017-07-22 PROCEDURE — 83690 ASSAY OF LIPASE: CPT

## 2017-07-22 PROCEDURE — 87070 CULTURE OTHR SPECIMN AEROBIC: CPT

## 2017-07-22 PROCEDURE — 83735 ASSAY OF MAGNESIUM: CPT

## 2017-07-22 PROCEDURE — 25000003 PHARM REV CODE 250: Performed by: PSYCHIATRY & NEUROLOGY

## 2017-07-22 PROCEDURE — 25000003 PHARM REV CODE 250: Performed by: PHYSICIAN ASSISTANT

## 2017-07-22 PROCEDURE — 99900026 HC AIRWAY MAINTENANCE (STAT)

## 2017-07-22 PROCEDURE — 84100 ASSAY OF PHOSPHORUS: CPT

## 2017-07-22 PROCEDURE — 86901 BLOOD TYPING SEROLOGIC RH(D): CPT

## 2017-07-22 PROCEDURE — 84295 ASSAY OF SERUM SODIUM: CPT

## 2017-07-22 PROCEDURE — 20000000 HC ICU ROOM

## 2017-07-22 PROCEDURE — 87205 SMEAR GRAM STAIN: CPT

## 2017-07-22 PROCEDURE — 63600175 PHARM REV CODE 636 W HCPCS: Performed by: PHYSICIAN ASSISTANT

## 2017-07-22 PROCEDURE — 85007 BL SMEAR W/DIFF WBC COUNT: CPT

## 2017-07-22 PROCEDURE — 82803 BLOOD GASES ANY COMBINATION: CPT

## 2017-07-22 PROCEDURE — 80053 COMPREHEN METABOLIC PANEL: CPT

## 2017-07-22 PROCEDURE — 82140 ASSAY OF AMMONIA: CPT

## 2017-07-22 PROCEDURE — P9021 RED BLOOD CELLS UNIT: HCPCS

## 2017-07-22 PROCEDURE — 82140 ASSAY OF AMMONIA: CPT | Mod: 91

## 2017-07-22 PROCEDURE — 25000003 PHARM REV CODE 250: Performed by: NURSE PRACTITIONER

## 2017-07-22 PROCEDURE — 94003 VENT MGMT INPAT SUBQ DAY: CPT

## 2017-07-22 PROCEDURE — 63600175 PHARM REV CODE 636 W HCPCS: Performed by: NURSE PRACTITIONER

## 2017-07-22 PROCEDURE — 85610 PROTHROMBIN TIME: CPT

## 2017-07-22 PROCEDURE — 86900 BLOOD TYPING SEROLOGIC ABO: CPT

## 2017-07-22 PROCEDURE — 94150 VITAL CAPACITY TEST: CPT

## 2017-07-22 PROCEDURE — 85027 COMPLETE CBC AUTOMATED: CPT

## 2017-07-22 PROCEDURE — 82150 ASSAY OF AMYLASE: CPT

## 2017-07-22 RX ORDER — 3% SODIUM CHLORIDE 3 G/100ML
300 INJECTION, SOLUTION INTRAVENOUS EVERY 6 HOURS PRN
Status: DISCONTINUED | OUTPATIENT
Start: 2017-07-22 | End: 2017-07-22

## 2017-07-22 RX ORDER — HYDROCODONE BITARTRATE AND ACETAMINOPHEN 500; 5 MG/1; MG/1
TABLET ORAL
Status: DISCONTINUED | OUTPATIENT
Start: 2017-07-22 | End: 2017-07-24

## 2017-07-22 RX ADMIN — Medication 10 ML: at 11:07

## 2017-07-22 RX ADMIN — HYDRALAZINE HYDROCHLORIDE 10 MG: 20 INJECTION INTRAMUSCULAR; INTRAVENOUS at 07:07

## 2017-07-22 RX ADMIN — VANCOMYCIN HYDROCHLORIDE 750 MG: 750 INJECTION, POWDER, LYOPHILIZED, FOR SOLUTION INTRAVENOUS at 07:07

## 2017-07-22 RX ADMIN — LEVOTHYROXINE SODIUM 25 MCG: 25 TABLET ORAL at 05:07

## 2017-07-22 RX ADMIN — LACTULOSE 10 G: 20 SOLUTION ORAL at 01:07

## 2017-07-22 RX ADMIN — Medication 10 ML: at 06:07

## 2017-07-22 RX ADMIN — PIPERACILLIN AND TAZOBACTAM 4.5 G: 4; .5 INJECTION, POWDER, LYOPHILIZED, FOR SOLUTION INTRAVENOUS; PARENTERAL at 03:07

## 2017-07-22 RX ADMIN — PIPERACILLIN AND TAZOBACTAM 4.5 G: 4; .5 INJECTION, POWDER, LYOPHILIZED, FOR SOLUTION INTRAVENOUS; PARENTERAL at 12:07

## 2017-07-22 RX ADMIN — CHLORHEXIDINE GLUCONATE 15 ML: 1.2 RINSE ORAL at 09:07

## 2017-07-22 RX ADMIN — HYDRALAZINE HYDROCHLORIDE 10 MG: 20 INJECTION INTRAMUSCULAR; INTRAVENOUS at 10:07

## 2017-07-22 RX ADMIN — SODIUM CHLORIDE: 0.9 INJECTION, SOLUTION INTRAVENOUS at 08:07

## 2017-07-22 RX ADMIN — Medication 10 ML: at 07:07

## 2017-07-22 RX ADMIN — Medication 3 ML: at 09:07

## 2017-07-22 RX ADMIN — FENTANYL CITRATE 25 MCG: 50 INJECTION INTRAMUSCULAR; INTRAVENOUS at 06:07

## 2017-07-22 RX ADMIN — Medication 3 ML: at 01:07

## 2017-07-22 RX ADMIN — LEVETIRACETAM 500 MG: 5 INJECTION INTRAVENOUS at 09:07

## 2017-07-22 RX ADMIN — Medication 3 ML: at 05:07

## 2017-07-22 RX ADMIN — FENTANYL CITRATE 25 MCG: 50 INJECTION INTRAMUSCULAR; INTRAVENOUS at 09:07

## 2017-07-22 RX ADMIN — HYDRALAZINE HYDROCHLORIDE 10 MG: 20 INJECTION INTRAMUSCULAR; INTRAVENOUS at 06:07

## 2017-07-22 RX ADMIN — LEVETIRACETAM 500 MG: 5 INJECTION INTRAVENOUS at 08:07

## 2017-07-22 RX ADMIN — AMLODIPINE BESYLATE 5 MG: 5 TABLET ORAL at 08:07

## 2017-07-22 RX ADMIN — VANCOMYCIN HYDROCHLORIDE 750 MG: 750 INJECTION, POWDER, LYOPHILIZED, FOR SOLUTION INTRAVENOUS at 08:07

## 2017-07-22 RX ADMIN — HEPARIN SODIUM 5000 UNITS: 5000 INJECTION, SOLUTION INTRAVENOUS; SUBCUTANEOUS at 08:07

## 2017-07-22 RX ADMIN — FENTANYL CITRATE 25 MCG: 50 INJECTION INTRAMUSCULAR; INTRAVENOUS at 11:07

## 2017-07-22 RX ADMIN — PIPERACILLIN AND TAZOBACTAM 4.5 G: 4; .5 INJECTION, POWDER, LYOPHILIZED, FOR SOLUTION INTRAVENOUS; PARENTERAL at 08:07

## 2017-07-22 RX ADMIN — Medication 10 ML: at 12:07

## 2017-07-22 RX ADMIN — PIPERACILLIN AND TAZOBACTAM 4.5 G: 4; .5 INJECTION, POWDER, LYOPHILIZED, FOR SOLUTION INTRAVENOUS; PARENTERAL at 11:07

## 2017-07-22 RX ADMIN — FENTANYL CITRATE 25 MCG: 50 INJECTION INTRAMUSCULAR; INTRAVENOUS at 03:07

## 2017-07-22 RX ADMIN — SODIUM CHLORIDE: 0.9 INJECTION, SOLUTION INTRAVENOUS at 04:07

## 2017-07-22 RX ADMIN — HEPARIN SODIUM 5000 UNITS: 5000 INJECTION, SOLUTION INTRAVENOUS; SUBCUTANEOUS at 09:07

## 2017-07-22 RX ADMIN — LACTULOSE 10 G: 20 SOLUTION ORAL at 05:07

## 2017-07-22 RX ADMIN — FAMOTIDINE 20 MG: 20 TABLET, FILM COATED ORAL at 09:07

## 2017-07-22 RX ADMIN — LACTULOSE 10 G: 20 SOLUTION ORAL at 09:07

## 2017-07-22 RX ADMIN — POTASSIUM CHLORIDE 40 MEQ: 20 SOLUTION ORAL at 05:07

## 2017-07-22 RX ADMIN — CHLORHEXIDINE GLUCONATE 15 ML: 1.2 RINSE ORAL at 08:07

## 2017-07-22 RX ADMIN — SODIUM CHLORIDE TAB 1 GM 1 G: 1 TAB at 09:07

## 2017-07-22 RX ADMIN — FAMOTIDINE 20 MG: 20 TABLET, FILM COATED ORAL at 08:07

## 2017-07-22 NOTE — CONSULTS
Consult received re: tube feeding requirements. RD is following pt, will post recs below. Will continue to follow-up as previously scheduled.     Recommendation/Intervention:   1. If unable to extubate >48hrs, recommend starting TF.   Isosource 1.5 @ goal rate 40mL/hr.   -Initiate @ 10mL/hr and increase by 10mL every 2-4hrs, or as tolerated, until goal rate is reached.   -Hold for residuals >500mL.      2. If able to extubate and advance diet, recommend Regular with texture per SLP recommendations.      RD to monitor.

## 2017-07-22 NOTE — PT/OT/SLP PROGRESS
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  Patient ageeable to PT session with head nod. Non-verbal able to follow simple commands by nodding or blinking.    Pain/Comfort  Pain Rating 1: 0/10  Pain Rating Post-Intervention 1: 0/10    Objective:   Patient found with: arterial line, blood pressure cuff, bowel management system, jenkins catheter, NG tube, pressure relief boots, SCD, restraints, pulse ox (continuous), ventilator    Functional Mobility:  Bed Mobility:   Rolling/Turning to Left: Maximum assistance (x2)  Rolling/Turning Right: Moderate assistance (x2)  Scooting/Bridging: Total Assistance, With assist of 2 (with help of RN)  Supine to Sit: Total Assistance  Sit to Supine: Total Assistance    Transfers:  Sit <> Stand Assistance:  (Did not occur; patient with limited command following limiting safety)    Gait:   Gait Distance: Did not occur; patient with limited command following limiting safety    Balance:   Static Sit: POOR: Needs MODERATE assist to maintain  Dynamic Sit: FAIR: Cannot move trunk without losing balance    Therapeutic Activities and Exercises:  Patient educated on role of PT/POC.    R roll with Max Assist x2 for repositioning post static sitting activity  L roll with Mod Assist; increased patient participation and command following    Supine<>sit Total Assist x1 x3 mins for 2 attempts    Patient returned to supine position. Total Assist  Positioned in bed with help of RN including scoot to HOB Total  Assist x2.    White board updated: safe to transfer with rehab only     AM-PAC 6 CLICK MOBILITY  How much help from another person does this patient currently need?   1 = Unable, Total/Dependent Assistance  2 = A lot, Maximum/Moderate Assistance  3 = A little, Minimum/Contact Guard/Supervision  4 = None, Modified [place]/Independent    Turning over in bed (including adjusting bedclothes, sheets and blankets)?: 2  Sitting down on and standing up from a chair with arms (e.g., wheelchair, bedside commode, etc.): 1  Moving from lying on back to sitting on the side of the bed?: 2  Moving to and from a bed to a chair (including a wheelchair)?: 1  Need to walk in hospital room?: 1  Climbing 3-5 steps with a railing?: 1  Total Score: 8    AM-PAC Raw Score CMS G-Code Modifier Level of Impairment Assistance   6 % Total / Unable   7 - 9 CM 80 - 100% Maximal Assist   10 - 14 CL 60 - 80% Moderate Assist   15 - 19 CK 40 - 60% Moderate Assist   20 - 22 CJ 20 - 40% Minimal Assist   23 CI 1-20% SBA / CGA   24 CH 0% Independent/ Mod I     Patient left supine with all lines intact, call button in reach and RN present.    Assessment:  Malina Robles is a 57 y.o. female with a medical diagnosis of Nontraumatic intracerebral hemorrhage in hemisphere, unspecified and presents with generalized weakness, impaired cognition with limited command following, and impaired dynamic balance limiting functional mobility. Supine<>sit Total Assist x1 for two attempts. Static sitting tolerance x3 minutes each with Mod Assist to maintain upright positioning with verbal cues for posture and hand placement. To benefit from continued skilled intervention to address deficits prior to transition back to NH.    Rehab identified problem list/impairments: Rehab identified problem list/impairments: weakness, impaired endurance, impaired sensation, impaired self care skills, impaired functional mobilty, gait instability, impaired balance,  impaired cognition, decreased coordination, decreased upper extremity function, decreased lower extremity function, decreased safety awareness, abnormal tone, decreased ROM, impaired coordination, impaired fine motor    Rehab potential is fair.    Activity tolerance: Fair    Discharge recommendations: Discharge Facility/Level Of Care Needs:  (return to NH)     Barriers to discharge: Barriers to Discharge: None    Equipment recommendations: Equipment Needed After Discharge: none     GOALS:    Physical Therapy Goals        Problem: Physical Therapy Goal    Goal Priority Disciplines Outcome Goal Variances Interventions   Physical Therapy Goal     PT/OT, PT Ongoing (interventions implemented as appropriate)     Description:  Goals to be met by: 8/3/2017     Patient will increase functional independence with mobility by performin. Pt will participate in bilateral UE and LE ROM exercises on 3 consecutive visits with no change in vitals.   2. Pt will perform rolling R and L with moderate assistance  3. Supine to sit with moderate assistance   4. Sit to supine with moderate assistance   5.  Pt will tolerate sitting EOB x 10 minutes with moderate assistance                       PLAN:    Patient to be seen 6 x/week  to address the above listed problems via gait training, therapeutic activities, therapeutic exercises, neuromuscular re-education  Plan of Care expires: 17  Plan of Care reviewed with: patient         David Corbett III, PT  2017

## 2017-07-22 NOTE — PROGRESS NOTES
[facility]  Neurocritical Care  Progress Note    Admit Date: 7/19/2017  Service Date: 07/22/2017  Length of Stay: 2    Subjective:     Chief Complaint: Nontraumatic intracerebral hemorrhage in hemisphere, unspecified    History of Present Illness: Ms. Malina Robles is 57 year old female with a PMHx of prior stroke,  Asthma, coronary artery disease (S/P Cardiac Surgery), Depression, Emphysema, GERD, Hypertension, thyroid disease, with recent Biliary Stent 7/18,  who presents to Neuro Critical Care as a transfer from [facility]/ Right Temporal ICH. Per the chart,  she was admitted to [facility] from [facility] Home on 7/12/17 with altered mental status and increase jaundice upon admission PT-INR 4.9, , , Ammonia <9 on 7/12. She was on Plavix prior to admit for coronary artery disease which was upon admission.CT of abdomen on 7/13/17 showed 2cm stone distal common duct resulting in moderate biliary ductal dilatation and 3cm infrarenal abdominal aortic aneurysm. On 7/14 Urine culture positive for gram negative rods. She is being treated with IV Ceftriaxone.  7/15 liver were enzymes worsening.   On 7/18/17 GI attempted  ERCP, but could not get stone out. Was able to place stent though with only old bile noted behind the stone. he was started on nicardipine gtt for goal  to 140. Her GCS was 9 to 10, and she was intubated for airway protection prior to arrival at [facility]. She lives at [facility]. No family at bedside. Patient will be admitted to Neuro Critical Care for a higher level of care.            Hospital Course: 7/19 admitted to Neuro Critical Care as a transfer from [facility] Right Temporal ICH 4.2 cm ; recent Biliary Stent 7/18 at outside hospital  Cultured today, consulted GI for repeat of ERCP; started abx, pending CTA  7/20: No issues. Bradycardic with Precedex.  Pending EEG report. CTA with no evidence of AVM.  Gi consult. Repeat ERCP.   7/21: Pending ERCP. Pending. More awake and interactive. Following commands.  Pending PICC line placement. Na 138. Getting HTS 2% 300 cc PRN.  7/22: awake, interactive, poorly following commands. PICC in place. Na 142. Discontinue hypertonic. Send ammonia    Interval History: no acute adverse events overnight; patient remains encephalopathic     Review of Systems: Unable to obtain a complete ROS due to level of consciousness.     Vitals:   Temp: 98.2 °F (36.8 °C) (07/22/17 1345)  Pulse: 82 (07/22/17 1423)  Resp: (!) 21 (07/22/17 1423)  BP: (!) 152/78 (07/22/17 1345)  SpO2: 100 % (07/22/17 1423)    Temp:  [98.2 °F (36.8 °C)-99.3 °F (37.4 °C)] 98.2 °F (36.8 °C)  Pulse:  [57-90] 82  Resp:  [12-38] 21  SpO2:  [100 %] 100 %  BP: ()/(52-84) 152/78  Arterial Line BP: ()/() 109/58         Vent Mode: Spont  Oxygen Concentration (%):  [40] 40  Resp Rate Total:  [12 br/min-28 br/min] 22 br/min  Vt Set:  [400 mL] 400 mL  PEEP/CPAP:  [5 cmH20] 5 cmH20  Pressure Support:  [0 cmH20-8 cmH20] 8 cmH20  Mean Airway Pressure:  [7 pwC65-18 cmH20] 7.6 cmH20    07/21 0701 - 07/22 0700  In: 4611 [I.V.:3021]  Out: 3316 [Urine:3116]     Physical Exam  Unable to test orientation, language, memory, judgment, insight, fund of knowledge, hearing, shoulder shrug, tongue protrusion, coordination, gait due to level of consciousness.  General   HEENT: ETT, severe jaundiced.  Chest Heart RRR / Lungs Clear to auscultation  Abdomen: Soft nontender + BS  Extremities: OK distal pulses.  Skin: UK  Neurological Exam:  MS; Arousable and not following commands.  CN: II-XII R-gaze preference. RUMN VII  Motor: LUE  3 /5 / RUE 3/5  Tone normal bilaterally R-UE slightly increased tone             LLE  3 /5 /  RLE  3/5  Tone normal bilaterally  Sensory: LT/PP/T/ Vibration Not assessed                 Complex sensory modalities: not tested  DTR:  normal throughout.  Coordination /Fine motor:   Gait: Not  tested.  Meningeal signs: Absent    Medications:   Continuous    sodium chloride 0.9% Last Rate: 75 mL/hr at 07/22/17 1205   Scheduled    amlodipine 5 mg Daily   chlorhexidine 15 mL BID   famotidine 20 mg BID   heparin (porcine) 5,000 Units Q12H   lactulose 10 g TID   levetiracetam in NaCl (iso-os) 500 mg Q12H   levothyroxine 25 mcg Before breakfast   piperacillin-tazobactam 4.5 g in dextrose 5 % 100 mL IVPB (ready to mix system) 4.5 g Q8H   sodium chloride 0.9% 10 mL Q6H   sodium chloride 0.9% 3 mL Q8H   vancomycin (VANCOCIN) IVPB 750 mg Q12H   PRN    sodium chloride  Q24H PRN   fentaNYL 25 mcg Q2H PRN   hydrALAZINE 10 mg Q4H PRN   magnesium oxide 800 mg PRN   magnesium oxide 800 mg PRN   potassium chloride 10% 40 mEq PRN   potassium chloride 10% 40 mEq PRN   potassium chloride 10% 60 mEq PRN   potassium, sodium phosphates 2 packet PRN   potassium, sodium phosphates 2 packet PRN   potassium, sodium phosphates 2 packet PRN   sodium chloride 0.9% 10 mL PRN              Assessment/Plan:     Neuro   * Nontraumatic intracerebral hemorrhage in hemisphere, unspecified    Right Temporal ICH   --Continue Neuro checks q 1hr  -- Neurosurgery consulted  -- Stroke Service consulted   -- CT Head 7/19 reveals there is a 4.2 cm hyperdense mass seen in the medial right temporal lobe with associated mass effect resulting in sulcal effacement of adjacent sulci.  -- repeat head CT on 7/20 stable  -- SBP goal < 140  -- Na+ goal 140  -- discontinue hypertonic saline, start salt tabs  -- continue Keppra  -- PT/OT/Speech  -- AMS does not correlate to CT head - will get ammonia level for further investigation of encephalopathy              Vasogenic cerebral edema    See ICH above        Hepatic coma/encephalopathy    Pt is encephalopathic  -ERCP pending  -LFTs elevated  -check ammonia          Hem/Onc   Leukocytosis    -- Leukocytosis  -- Started empirical abx treatment with Vancomycin /Zosyn for 8 days on 7/19, de-escalate as soon as cx  has returned  -- Pan culture pending - sent repeat resp culture today        Fluids/Electrolytes/Nutrition/GI   Respiratory alkalosis    On spontaneous - monitor ABG            Prophylaxis:  Venous Thromboembolism: mechanical chemical  Stress Ulcer: H2B  Ventilator Pneumonia: yes     Activity Orders          None        Full Code    Seferino Gutierrez MD  Neurocritical Care  [facility]

## 2017-07-22 NOTE — PLAN OF CARE
Problem: Patient Care Overview  Goal: Plan of Care Review  POC reviewed with pt at 0500. Pt unable to verbalize understanding due to current clinical condition. No acute events overnight. Potassium was replaced per PRN order. 2 PRN 3% sodium chloride boluses were given for serum sodium <145. Pt progressing toward goals. Will continue to monitor. See flowsheets for full assessment and VS info

## 2017-07-22 NOTE — ASSESSMENT & PLAN NOTE
--Continue Neuro checks q 1hr  -- Neurosurgery consulted  -- Stroke Service consulted   -- CT Head 7/19 reveals there is a 4.2 cm hyperdense mass seen in the medial right temporal lobe with associated mass effect resulting in sulcal effacement of adjacent sulci.  -- repeat head CT on 7/20 stable  -- SBP goal < 140  -- Na+ goal 140  -- continue Keppra  -- PT/OT/Speech

## 2017-07-22 NOTE — PROGRESS NOTES
Notified Dr. Ayala of low H&H (7.1 & 22.3). No new orders at this time. Will continue to monitor.

## 2017-07-22 NOTE — PROGRESS NOTES
[facility]  Neurocritical Care  Progress Note    Admit Date: 7/19/2017  Service Date: 07/21/2017  Length of Stay: 2    Subjective:     Chief Complaint: Nontraumatic intracerebral hemorrhage in hemisphere, unspecified    History of Present Illness: Ms. Malina Robles is 57 year old female with a PMHx of prior stroke,  Asthma, coronary artery disease (S/P Cardiac Surgery), Depression, Emphysema, GERD, Hypertension, thyroid disease, with recent Biliary Stent 7/18,  who presents to Neuro Critical Care as a transfer from [facility]/ Right Temporal ICH. Per the chart,  she was admitted to [facility] Home on 7/12/17 with altered mental status and increase jaundice upon admission PT-INR 4.9, , , Ammonia <9 on 7/12. She was on Plavix prior to admit for coronary artery disease which was upon admission.CT of abdomen on 7/13/17 showed 2cm stone distal common duct resulting in moderate biliary ductal dilatation and 3cm infrarenal abdominal aortic aneurysm. On 7/14 Urine culture positive for gram negative rods. She is being treated with IV Ceftriaxone.  7/15 liver were enzymes worsening.   On 7/18/17 GI attempted  ERCP, but could not get stone out. Was able to place stent though with only old bile noted behind the stone. he was started on nicardipine gtt for goal  to 140. Her GCS was 9 to 10, and she was intubated for airway protection prior to arrival at [facility]. She lives at [facility]. No family at bedside. Patient will be admitted to Neuro Critical Care for a higher level of care.            Hospital Course: 7/19 admitted to Neuro Critical Care as a transfer from [facility] Right Temporal ICH 4.2 cm ; recent Biliary Stent 7/18 at outside hospital  Cultured today, consulted GI for repeat of ERCP; started abx, pending CTA  7/20: No issues. Bradycardic with Precedex.  Pending EEG report. CTA with no evidence of AVM.  Gi consult. Repeat ERCP.   7/21: Pending ERCP. Pending. More awake and interactive. Following commands.  Pending PICC line placement. Na 138. Getting HTS 2% 300 cc PRN.    Interval History:  >4 elements OR status of 3 inpatient conditions  Pending ERCP. Pending. More awake and interactive. Following commands.  Pending PICC line placement. Na 138. Getting HTS 2% 300 cc PRN.  Review of Systems   2 systems OR Unable to obtain a complete ROS due to level of consciousness.  Objective:     Vitals:  Temp: 99.1 °F (37.3 °C) (07/22/17 0705)  Pulse: 88 (07/22/17 0744)  Resp: (!) 31 (07/22/17 0744)  BP: 105/77 (07/22/17 0744)  SpO2: 100 % (07/22/17 0744)    Temp:  [98.6 °F (37 °C)-99.3 °F (37.4 °C)] 99.1 °F (37.3 °C)  Pulse:  [55-88] 88  Resp:  [12-38] 31  SpO2:  [100 %] 100 %  BP: ()/(52-84) 105/77  Arterial Line BP: ()/() 66/38         Vent Mode: Spont  Oxygen Concentration (%):  [40] 40  Resp Rate Total:  [12 br/min-28 br/min] 28 br/min  Vt Set:  [400 mL] 400 mL  PEEP/CPAP:  [5 cmH20] 5 cmH20  Pressure Support:  [0 cmH20-8 cmH20] 8 cmH20  Mean Airway Pressure:  [7.5 frT09-71 cmH20] 7.5 cmH20    07/21 0701 - 07/22 0700  In: 4611 [I.V.:3021]  Out: 3316 [Urine:3116]    Physical Exam  Unable to test orientation, language, memory, judgment, insight, fund of knowledge, hearing, shoulder shrug, tongue protrusion, coordination, gait due to level of consciousness.  General   HEENT: ETT, severe jaundiced.  Chest Heart RRR / Lungs Clear to auscultation  Abdomen: Soft nontender + BS  Extremities: OK distal pulses.  Skin: UK  Neurological Exam:  MS; Arousable and not following commands.  CN: II-XII R-gaze preference. RUMN VII  Motor: LUE  3 /5 / RUE 3/5  Tone normal bilaterally R-UE increased tone and contractures             LLE  3 /5 /  RLE  3/5  Tone normal bilaterally  Sensory: LT/PP/T/ Vibration Not assessed                 Complex sensory modalities: not tested  DTR:  normal throughout.  Coordination /Fine motor:    Gait: Not tested.  Meningeal signs: Absent  Medications:  Continuous  sodium chloride 0.9% Last Rate: 50 mL/hr at 07/22/17 0705   nicardipine Last Rate: Stopped (07/20/17 1400)   Scheduled  amlodipine 5 mg Daily   chlorhexidine 15 mL BID   famotidine 20 mg BID   heparin (porcine) 5,000 Units Q12H   lactulose 10 g TID   levetiracetam in NaCl (iso-os) 500 mg Q12H   levothyroxine 25 mcg Before breakfast   piperacillin-tazobactam 4.5 g in dextrose 5 % 100 mL IVPB (ready to mix system) 4.5 g Q8H   sodium chloride 0.9% 10 mL Q6H   sodium chloride 0.9% 3 mL Q8H   vancomycin (VANCOCIN) IVPB 750 mg Q12H   PRN  fentaNYL 25 mcg Q2H PRN   hydrALAZINE 10 mg Q4H PRN   magnesium oxide 800 mg PRN   magnesium oxide 800 mg PRN   potassium chloride 10% 40 mEq PRN   potassium chloride 10% 40 mEq PRN   potassium chloride 10% 60 mEq PRN   potassium, sodium phosphates 2 packet PRN   potassium, sodium phosphates 2 packet PRN   potassium, sodium phosphates 2 packet PRN   sodium chloride 0.9% 10 mL PRN   sodium chloride 3% 300 mL Q6H PRN     Today I personally reviewed pertinent medications, lines/drains/airways, imaging, cardiology, lab results, microbiology results, notably:      Assessment/Plan:     No new Assessment & Plan notes have been filed under this hospital service since the last note was generated.  Service: Neuro Critical Care      Active Problem List:   1.R-temporal  ICH spontaneus  2.HTN  3. Biliary obstruction. Pending ERCP. Prior biliary stent.  4. CAD  5. GERD  6. Hx thyroid disease.  7. Prior L-HIGINIO/ MCA stroke.     Assessment / Plan:  R/O AVM related bleeding .  Neuro: Target serum Na 145-150.  -HTS 2% 300 cc q 6hrs PRN for serum Na < 145.  -Fentanyl 25-50mcg IV PRN.  -Keppra 500mg q 12hrs x 7 days.  -Keep normothermic and normoglycemic  -Head of the bed at 30 degrees.  -Hourly pupillometry.  -CT head at noon.  Resp:  -Vent Mode: A/C  Oxygen Concentration (%):  [] 100  Resp Rate Total:  [16 br/min-23 br/min] 16  br/min  Vt Set:  [400 mL] 400 mL  PEEP/CPAP:  [5 cmH20] 5 cmH20  Pressure Support:  [0 cmH20] 0 cmH20  Mean Airway Pressure:  [8 cmH20-9.5 cmH20] 8.7 cmH20   -VAP  CV: Keep SBP <=140 mmHg  -TTE  -Amlodipine 5mg qd.  -12 lead ECG  -Cardene IVD PRN.  -PICC line consult.  IVF/nutrition/Renal/GI: Cholelithiasis' Target serum Na 145-150.  -TF trickle feedings.  -E-replacement protocol  -PICC line consult.  -LFTs , amylase, lipase, ammonia.  -OGT  -NS at 50cc /hr IVD  -Serum Na q 6hrs  -HTS 2% 300 cc q 6hrs PRN for serum Na < 145.  -Change HTS 3% boluses when PICC line placed.  -BR  Hem / ID: WBC 12 K. INR 1.0, Pro calcitonin OK. 0.16. Bilary obstruction.  -Coagulation OK  -Vanc /Zosyn.  -Procalcitonin.  -CBC + diff.  Endo: -Free T4 OK,.  -Levothroxine 0.025mg qd.  Prophylaxis:  -SCDs  -VAP  -Famotidine.  SC Heaprin prophylaxis 5000 U q 12hrs.   Advance Directives and Disposition:    Full Code. Keep in the  NICU.           Uninterrupted Critical Care/Counseling Time (directly spent today by me not including procedures 30-74 min (15774)): =   35 min    Activity Orders          None        Full Code    Enoch Colon MD  Neurocritical Care  [facility]

## 2017-07-22 NOTE — PROGRESS NOTES
Returned to vent.  Parameters taken.     NIF = -50, Vt = 260, VC = n/a, RSBI = 115.  Passed leak test.

## 2017-07-22 NOTE — PLAN OF CARE
Problem: Patient Care Overview  Goal: Plan of Care Review  Outcome: Ongoing (interventions implemented as appropriate)  POC reviewed with pt at 1400.  Pt showed no signs of understanding. Questions and concerns addressed. No acute events today. Pt progressing toward goals. Will continue to monitor. See flowsheets for full assessment and VS info.

## 2017-07-22 NOTE — SUBJECTIVE & OBJECTIVE
Interval History:  >4 elements OR status of 3 inpatient conditions  Pending ERCP. Pending. More awake and interactive. Following commands.  Pending PICC line placement. Na 138. Getting HTS 2% 300 cc PRN.  Review of Systems   2 systems OR Unable to obtain a complete ROS due to level of consciousness.  Objective:     Vitals:  Temp: 99.1 °F (37.3 °C) (07/22/17 0705)  Pulse: 88 (07/22/17 0744)  Resp: (!) 31 (07/22/17 0744)  BP: 105/77 (07/22/17 0744)  SpO2: 100 % (07/22/17 0744)    Temp:  [98.6 °F (37 °C)-99.3 °F (37.4 °C)] 99.1 °F (37.3 °C)  Pulse:  [55-88] 88  Resp:  [12-38] 31  SpO2:  [100 %] 100 %  BP: ()/(52-84) 105/77  Arterial Line BP: ()/() 66/38         Vent Mode: Spont  Oxygen Concentration (%):  [40] 40  Resp Rate Total:  [12 br/min-28 br/min] 28 br/min  Vt Set:  [400 mL] 400 mL  PEEP/CPAP:  [5 cmH20] 5 cmH20  Pressure Support:  [0 cmH20-8 cmH20] 8 cmH20  Mean Airway Pressure:  [7.5 ejS67-52 cmH20] 7.5 cmH20    07/21 0701 - 07/22 0700  In: 4611 [I.V.:3021]  Out: 3316 [Urine:3116]    Physical Exam  Unable to test orientation, language, memory, judgment, insight, fund of knowledge, hearing, shoulder shrug, tongue protrusion, coordination, gait due to level of consciousness.  General   HEENT: ETT, severe jaundiced.  Chest Heart RRR / Lungs Clear to auscultation  Abdomen: Soft nontender + BS  Extremities: OK distal pulses.  Skin: UK  Neurological Exam:  MS; Arousable and not following commands.  CN: II-XII R-gaze preference. RUMN VII  Motor: LUE  3 /5 / RUE 3/5  Tone normal bilaterally R-UE increased tone and contractures             LLE  3 /5 /  RLE  3/5  Tone normal bilaterally  Sensory: LT/PP/T/ Vibration Not assessed                 Complex sensory modalities: not tested  DTR:  normal throughout.  Coordination /Fine motor:   Gait: Not tested.  Meningeal signs: Absent  Medications:  Continuous  sodium chloride 0.9% Last Rate: 50 mL/hr at 07/22/17 0705   nicardipine Last Rate: Stopped  (07/20/17 1400)   Scheduled  amlodipine 5 mg Daily   chlorhexidine 15 mL BID   famotidine 20 mg BID   heparin (porcine) 5,000 Units Q12H   lactulose 10 g TID   levetiracetam in NaCl (iso-os) 500 mg Q12H   levothyroxine 25 mcg Before breakfast   piperacillin-tazobactam 4.5 g in dextrose 5 % 100 mL IVPB (ready to mix system) 4.5 g Q8H   sodium chloride 0.9% 10 mL Q6H   sodium chloride 0.9% 3 mL Q8H   vancomycin (VANCOCIN) IVPB 750 mg Q12H   PRN  fentaNYL 25 mcg Q2H PRN   hydrALAZINE 10 mg Q4H PRN   magnesium oxide 800 mg PRN   magnesium oxide 800 mg PRN   potassium chloride 10% 40 mEq PRN   potassium chloride 10% 40 mEq PRN   potassium chloride 10% 60 mEq PRN   potassium, sodium phosphates 2 packet PRN   potassium, sodium phosphates 2 packet PRN   potassium, sodium phosphates 2 packet PRN   sodium chloride 0.9% 10 mL PRN   sodium chloride 3% 300 mL Q6H PRN     Today I personally reviewed pertinent medications, lines/drains/airways, imaging, cardiology, lab results, microbiology results, notably:

## 2017-07-22 NOTE — ASSESSMENT & PLAN NOTE
Right Temporal ICH   --Continue Neuro checks q 1hr  -- Neurosurgery consulted  -- Stroke Service consulted   -- CT Head 7/19 reveals there is a 4.2 cm hyperdense mass seen in the medial right temporal lobe with associated mass effect resulting in sulcal effacement of adjacent sulci.  -- repeat head CT on 7/20 stable  -- SBP goal < 140  -- Na+ goal 140  -- discontinue hypertonic saline, start salt tabs  -- continue Keppra  -- PT/OT/Speech  -- AMS does not correlate to CT head - will get ammonia level for further investigation of encephalopathy

## 2017-07-22 NOTE — PLAN OF CARE
Problem: Physical Therapy Goal  Goal: Physical Therapy Goal  Goals to be met by: 8/3/2017     Patient will increase functional independence with mobility by performin. Pt will participate in bilateral UE and LE ROM exercises on 3 consecutive visits with no change in vitals.   2. Pt will perform rolling R and L with moderate assistance  3. Supine to sit with moderate assistance   4. Sit to supine with moderate assistance   5.  Pt will tolerate sitting EOB x 10 minutes with moderate assistance      Outcome: Ongoing (interventions implemented as appropriate)  Goals assessed and remain appropriate to improve functional mobility.    David Corbett III, DPT  2017

## 2017-07-22 NOTE — ASSESSMENT & PLAN NOTE
-- Leukocytosis  -- Started empirical abx treatment with Vancomycin /Zosyn for 8 days on 7/19, de-escalate as soon as cx has returned  -- Pan culture pending - sent repeat resp culture yesterday  -- continue broad spec abx while cx's pending

## 2017-07-22 NOTE — PROGRESS NOTES
[facility]  Neurocritical Care  Progress Note    Admit Date: 7/19/2017  Service Date: 07/22/2017  Length of Stay: 2    Subjective:     Chief Complaint: Nontraumatic intracerebral hemorrhage in hemisphere, unspecified    History of Present Illness: Ms. Malina Robles is 57 year old female with a PMHx of prior stroke,  Asthma, coronary artery disease (S/P Cardiac Surgery), Depression, Emphysema, GERD, Hypertension, thyroid disease, with recent Biliary Stent 7/18,  who presents to Neuro Critical Care as a transfer from [facility]/ Right Temporal ICH. Per the chart,  she was admitted to [facility] from [facility] Home on 7/12/17 with altered mental status and increase jaundice upon admission PT-INR 4.9, , , Ammonia <9 on 7/12. She was on Plavix prior to admit for coronary artery disease which was upon admission.CT of abdomen on 7/13/17 showed 2cm stone distal common duct resulting in moderate biliary ductal dilatation and 3cm infrarenal abdominal aortic aneurysm. On 7/14 Urine culture positive for gram negative rods. She is being treated with IV Ceftriaxone.  7/15 liver were enzymes worsening.   On 7/18/17 GI attempted  ERCP, but could not get stone out. Was able to place stent though with only old bile noted behind the stone. he was started on nicardipine gtt for goal  to 140. Her GCS was 9 to 10, and she was intubated for airway protection prior to arrival at [facility]. She lives at [facility]. No family at bedside. Patient will be admitted to Neuro Critical Care for a higher level of care.            Hospital Course: 7/19 admitted to Neuro Critical Care as a transfer from [facility] Right Temporal ICH 4.2 cm ; recent Biliary Stent 7/18 at outside hospital  Cultured today, consulted GI for repeat of ERCP; started abx, pending CTA  7/20: No issues. Bradycardic with Precedex.  Pending EEG report. CTA with no evidence of AVM.  Gi consult. Repeat ERCP.   7/21: Pending ERCP. Pending. More awake and interactive. Following commands.  Pending PICC line placement. Na 138. Getting HTS 2% 300 cc PRN.  7/22: awake, interactive, poorly following commands. PICC in place. Na 142. Discontinue hypertonic. Send ammonia    Review of Symptoms:   Unable to fully obtain, encephalopathy    Physical Exam:  GA:  comfortable, no acute distress.   HEENT: No scleral icterus or JVD.   Pulmonary: Clear to auscultation A/L. No wheezing, crackles, or rhonchi.  Cardiac: RRR S1 & S2 w/o rubs/murmurs/gallops.   Abdominal: Bowel sounds present x 4. No appreciable hepatosplenomegaly.  Skin: jaundice  Pulses: 1+ Dp bilat    Neuro:  --sedation: none  --GCS: [id number]  --Mental Status: encephalopathic, no commands, doses off if not stimulated continuously   --CN II-XII grossly intact.   --Pupils 3-->2mm, PERRL. Scleral icterus  --brainstem: weak cough and gag  --Motor: 4/5 throughout  --sensory: intact to soft touch and pain throughout  --Reflexes: not tested  --Gait: deferred      Recent Labs  Lab 07/22/17 0236   WBC 11.11   RBC 2.33*   HGB 7.1*   HCT 22.3*   *   MCV 96   MCH 30.5   MCHC 31.8*       Recent Labs  Lab 07/22/17 0236 07/22/17  1134   CALCIUM 8.2*  --   --    PROT 5.5*  --   --      < > 140   K 3.5  --  4.0   CO2 20*  --   --    *  --   --    BUN 4*  --   --    CREATININE 0.7  --   --    ALKPHOS 363*  --   --    ALT 98*  --   --    AST 53*  --   --    BILITOT 5.2*  --   --    < > = values in this interval not displayed.    Recent Labs  Lab 07/22/17 0236   INR 1.0     Vent Mode: Spont  Oxygen Concentration (%):  [40] 40  Resp Rate Total:  [12 br/min-30 br/min] 25 br/min  Vt Set:  [400 mL] 400 mL  PEEP/CPAP:  [5 cmH20] 5 cmH20  Pressure Support:  [0 cmH20-8 cmH20] 8 cmH20  Mean Airway Pressure:  [7 yxW77-57 cmH20] 8.3 cmH20      I have personally reviewed all labs, imaging, and studies today      Assessment/Plan:     Neuro   * Nontraumatic  intracerebral hemorrhage in hemisphere, unspecified      --Continue Neuro checks q 1hr  -- Neurosurgery consulted  -- Stroke Service consulted   -- CT Head 7/19 reveals there is a 4.2 cm hyperdense mass seen in the medial right temporal lobe with associated mass effect resulting in sulcal effacement of adjacent sulci.  -- repeat head CT on 7/20 stable  -- SBP goal < 140  -- Na+ goal 140  -- continue Keppra  -- PT/OT/Speech                Vasogenic cerebral edema    See ICH above        Chronic hepatitis C with hepatic coma    --see hepatic coma encephalopathy          Hepatic coma/encephalopathy    Pt is encephalopathic  -ERCP repeat pending for   -LFTs elevated  -trend ammonia          Cardiac   Essential hypertension    -- SBP goal less 140  -- 2D echo pending  -- EKG pending   -- CK MB and Troponin pending        Renal   E-coli UTI    -- Treat with 7 day course of Ceftriaxone ; Treatment began at outside hospital prior to arrival         Endocrine   Hypothyroid    --TSH 5.2  -- Continue Synthroid  --  Endocrinology consult        Fluids/Electrolytes/Nutrition/GI   Calculus of bile duct without mention of cholecystitis, with obstruction    --ERCP unsuccessful stone removal  --continue Lactulose   --trend ammonia  --trickle feeds        Cholelithiasis    -- See above  -- Consulted GI  --ERCP unsuccessful  --will need stone removal in future        Other   Acute blood loss anemia    2U PRBC  Monitor HH  No signs of bleeding          Elevated liver enzymes    --lactulose  -- See above           Jaundice    -- see cholelithiasis            Prophylaxis:  Venous Thromboembolism: chemical  Stress Ulcer: H2B  Ventilator Pneumonia: yes     Activity Orders          None        Full Code    Varinder Olivas MD  Neurocritical Care  [facility] time 32 minutes

## 2017-07-23 LAB
ALBUMIN SERPL BCP-MCNC: 2.2 G/DL
ALLENS TEST: ABNORMAL
ALP SERPL-CCNC: 388 U/L
ALT SERPL W/O P-5'-P-CCNC: 90 U/L
AMMONIA PLAS-SCNC: 36 UMOL/L
AMYLASE SERPL-CCNC: 45 U/L
ANION GAP SERPL CALC-SCNC: 11 MMOL/L
ANISOCYTOSIS BLD QL SMEAR: ABNORMAL
AST SERPL-CCNC: 56 U/L
BASO STIPL BLD QL SMEAR: ABNORMAL
BASOPHILS # BLD AUTO: ABNORMAL K/UL
BASOPHILS NFR BLD: 0 %
BILIRUB SERPL-MCNC: 5.4 MG/DL
BILIRUB UR QL STRIP: NEGATIVE
BUN SERPL-MCNC: 5 MG/DL
BURR CELLS BLD QL SMEAR: ABNORMAL
CALCIUM SERPL-MCNC: 8.4 MG/DL
CHLORIDE SERPL-SCNC: 108 MMOL/L
CLARITY UR REFRACT.AUTO: CLEAR
CO2 SERPL-SCNC: 18 MMOL/L
COLOR UR AUTO: YELLOW
CREAT SERPL-MCNC: 0.7 MG/DL
DACRYOCYTES BLD QL SMEAR: ABNORMAL
DELSYS: ABNORMAL
DIFFERENTIAL METHOD: ABNORMAL
EOSINOPHIL # BLD AUTO: ABNORMAL K/UL
EOSINOPHIL NFR BLD: 7 %
ERYTHROCYTE [DISTWIDTH] IN BLOOD BY AUTOMATED COUNT: 16.8 %
EST. GFR  (AFRICAN AMERICAN): >60 ML/MIN/1.73 M^2
EST. GFR  (NON AFRICAN AMERICAN): >60 ML/MIN/1.73 M^2
FIO2: 40
GLUCOSE SERPL-MCNC: 99 MG/DL
GLUCOSE UR QL STRIP: NEGATIVE
HCO3 UR-SCNC: 25.2 MMOL/L (ref 24–28)
HCT VFR BLD AUTO: 33.2 %
HGB BLD-MCNC: 11.3 G/DL
HGB UR QL STRIP: NEGATIVE
HYPOCHROMIA BLD QL SMEAR: ABNORMAL
INR PPP: 1
KETONES UR QL STRIP: NEGATIVE
LEUKOCYTE ESTERASE UR QL STRIP: NEGATIVE
LIPASE SERPL-CCNC: 59 U/L
LYMPHOCYTES # BLD AUTO: ABNORMAL K/UL
LYMPHOCYTES NFR BLD: 18 %
MAGNESIUM SERPL-MCNC: 1.9 MG/DL
MCH RBC QN AUTO: 30.8 PG
MCHC RBC AUTO-ENTMCNC: 34 G/DL
MCV RBC AUTO: 91 FL
METAMYELOCYTES NFR BLD MANUAL: 3 %
MIN VOL: 6
MODE: ABNORMAL
MONOCYTES # BLD AUTO: ABNORMAL K/UL
MONOCYTES NFR BLD: 7 %
NEUTROPHILS NFR BLD: 63 %
NEUTS BAND NFR BLD MANUAL: 2 %
NITRITE UR QL STRIP: NEGATIVE
OVALOCYTES BLD QL SMEAR: ABNORMAL
PCO2 BLDA: 37.3 MMHG (ref 35–45)
PEEP: 5
PH SMN: 7.44 [PH] (ref 7.35–7.45)
PH UR STRIP: 6 [PH] (ref 5–8)
PHOSPHATE SERPL-MCNC: 3.6 MG/DL
PLATELET # BLD AUTO: 163 K/UL
PLATELET BLD QL SMEAR: ABNORMAL
PMV BLD AUTO: 9.7 FL
PO2 BLDA: 147 MMHG (ref 80–100)
POC BE: 1 MMOL/L
POC SATURATED O2: 99 % (ref 95–100)
POC TCO2: 26 MMOL/L (ref 23–27)
POIKILOCYTOSIS BLD QL SMEAR: SLIGHT
POLYCHROMASIA BLD QL SMEAR: ABNORMAL
POTASSIUM SERPL-SCNC: 3.5 MMOL/L
POTASSIUM SERPL-SCNC: 4 MMOL/L
PROT SERPL-MCNC: 5.9 G/DL
PROT UR QL STRIP: NEGATIVE
PROTHROMBIN TIME: 10.8 SEC
PS: 8
RBC # BLD AUTO: 3.67 M/UL
SAMPLE: ABNORMAL
SITE: ABNORMAL
SODIUM SERPL-SCNC: 124 MMOL/L
SODIUM SERPL-SCNC: 137 MMOL/L
SODIUM SERPL-SCNC: 138 MMOL/L
SODIUM SERPL-SCNC: 139 MMOL/L
SP GR UR STRIP: 1 (ref 1–1.03)
SP02: 100
SPHEROCYTES BLD QL SMEAR: ABNORMAL
SPONT RATE: 23
TOXIC GRANULES BLD QL SMEAR: PRESENT
URN SPEC COLLECT METH UR: NORMAL
UROBILINOGEN UR STRIP-ACNC: NEGATIVE EU/DL
VANCOMYCIN TROUGH SERPL-MCNC: 13.2 UG/ML
WBC # BLD AUTO: 18.77 K/UL

## 2017-07-23 PROCEDURE — 63600175 PHARM REV CODE 636 W HCPCS: Performed by: NURSE PRACTITIONER

## 2017-07-23 PROCEDURE — 85007 BL SMEAR W/DIFF WBC COUNT: CPT

## 2017-07-23 PROCEDURE — 84295 ASSAY OF SERUM SODIUM: CPT | Mod: 91

## 2017-07-23 PROCEDURE — 83735 ASSAY OF MAGNESIUM: CPT

## 2017-07-23 PROCEDURE — 25000003 PHARM REV CODE 250: Performed by: NURSE PRACTITIONER

## 2017-07-23 PROCEDURE — 94003 VENT MGMT INPAT SUBQ DAY: CPT

## 2017-07-23 PROCEDURE — 85027 COMPLETE CBC AUTOMATED: CPT

## 2017-07-23 PROCEDURE — 20000000 HC ICU ROOM

## 2017-07-23 PROCEDURE — 99900026 HC AIRWAY MAINTENANCE (STAT)

## 2017-07-23 PROCEDURE — 83690 ASSAY OF LIPASE: CPT

## 2017-07-23 PROCEDURE — 25000003 PHARM REV CODE 250: Performed by: PHYSICIAN ASSISTANT

## 2017-07-23 PROCEDURE — 81003 URINALYSIS AUTO W/O SCOPE: CPT

## 2017-07-23 PROCEDURE — 63600175 PHARM REV CODE 636 W HCPCS: Performed by: PHYSICIAN ASSISTANT

## 2017-07-23 PROCEDURE — 99291 CRITICAL CARE FIRST HOUR: CPT | Mod: ,,, | Performed by: PSYCHIATRY & NEUROLOGY

## 2017-07-23 PROCEDURE — 25000003 PHARM REV CODE 250: Performed by: STUDENT IN AN ORGANIZED HEALTH CARE EDUCATION/TRAINING PROGRAM

## 2017-07-23 PROCEDURE — 94761 N-INVAS EAR/PLS OXIMETRY MLT: CPT

## 2017-07-23 PROCEDURE — 85610 PROTHROMBIN TIME: CPT

## 2017-07-23 PROCEDURE — 80202 ASSAY OF VANCOMYCIN: CPT

## 2017-07-23 PROCEDURE — 84100 ASSAY OF PHOSPHORUS: CPT

## 2017-07-23 PROCEDURE — 25000003 PHARM REV CODE 250: Performed by: PSYCHIATRY & NEUROLOGY

## 2017-07-23 PROCEDURE — 82150 ASSAY OF AMYLASE: CPT

## 2017-07-23 PROCEDURE — 84132 ASSAY OF SERUM POTASSIUM: CPT

## 2017-07-23 PROCEDURE — 82140 ASSAY OF AMMONIA: CPT

## 2017-07-23 PROCEDURE — 80053 COMPREHEN METABOLIC PANEL: CPT

## 2017-07-23 RX ORDER — LABETALOL HYDROCHLORIDE 5 MG/ML
10 INJECTION, SOLUTION INTRAVENOUS EVERY 6 HOURS PRN
Status: DISCONTINUED | OUTPATIENT
Start: 2017-07-23 | End: 2017-07-24

## 2017-07-23 RX ORDER — LABETALOL HYDROCHLORIDE 5 MG/ML
20 INJECTION, SOLUTION INTRAVENOUS EVERY 6 HOURS PRN
Status: DISCONTINUED | OUTPATIENT
Start: 2017-07-23 | End: 2017-07-23

## 2017-07-23 RX ADMIN — LACTULOSE 10 G: 20 SOLUTION ORAL at 01:07

## 2017-07-23 RX ADMIN — VANCOMYCIN HYDROCHLORIDE 750 MG: 750 INJECTION, POWDER, LYOPHILIZED, FOR SOLUTION INTRAVENOUS at 07:07

## 2017-07-23 RX ADMIN — Medication 3 ML: at 09:07

## 2017-07-23 RX ADMIN — HYDRALAZINE HYDROCHLORIDE 10 MG: 20 INJECTION INTRAMUSCULAR; INTRAVENOUS at 11:07

## 2017-07-23 RX ADMIN — HEPARIN SODIUM 5000 UNITS: 5000 INJECTION, SOLUTION INTRAVENOUS; SUBCUTANEOUS at 09:07

## 2017-07-23 RX ADMIN — PIPERACILLIN AND TAZOBACTAM 4.5 G: 4; .5 INJECTION, POWDER, LYOPHILIZED, FOR SOLUTION INTRAVENOUS; PARENTERAL at 11:07

## 2017-07-23 RX ADMIN — AMLODIPINE BESYLATE 5 MG: 5 TABLET ORAL at 08:07

## 2017-07-23 RX ADMIN — CHLORHEXIDINE GLUCONATE 15 ML: 1.2 RINSE ORAL at 09:07

## 2017-07-23 RX ADMIN — SODIUM CHLORIDE TAB 1 GM 1 G: 1 TAB at 01:07

## 2017-07-23 RX ADMIN — POTASSIUM CHLORIDE 40 MEQ: 20 SOLUTION ORAL at 04:07

## 2017-07-23 RX ADMIN — LACTULOSE 10 G: 20 SOLUTION ORAL at 09:07

## 2017-07-23 RX ADMIN — HEPARIN SODIUM 5000 UNITS: 5000 INJECTION, SOLUTION INTRAVENOUS; SUBCUTANEOUS at 08:07

## 2017-07-23 RX ADMIN — SODIUM CHLORIDE TAB 1 GM 1 G: 1 TAB at 05:07

## 2017-07-23 RX ADMIN — LABETALOL HYDROCHLORIDE 10 MG: 5 INJECTION, SOLUTION INTRAVENOUS at 10:07

## 2017-07-23 RX ADMIN — Medication 3 ML: at 05:07

## 2017-07-23 RX ADMIN — FENTANYL CITRATE 25 MCG: 50 INJECTION INTRAMUSCULAR; INTRAVENOUS at 07:07

## 2017-07-23 RX ADMIN — LACTULOSE 10 G: 20 SOLUTION ORAL at 05:07

## 2017-07-23 RX ADMIN — Medication 10 ML: at 07:07

## 2017-07-23 RX ADMIN — FAMOTIDINE 20 MG: 20 TABLET, FILM COATED ORAL at 08:07

## 2017-07-23 RX ADMIN — SODIUM CHLORIDE: 0.9 INJECTION, SOLUTION INTRAVENOUS at 11:07

## 2017-07-23 RX ADMIN — Medication 10 ML: at 11:07

## 2017-07-23 RX ADMIN — Medication 10 ML: at 05:07

## 2017-07-23 RX ADMIN — LEVETIRACETAM 500 MG: 5 INJECTION INTRAVENOUS at 09:07

## 2017-07-23 RX ADMIN — Medication 3 ML: at 02:07

## 2017-07-23 RX ADMIN — VANCOMYCIN HYDROCHLORIDE 750 MG: 750 INJECTION, POWDER, LYOPHILIZED, FOR SOLUTION INTRAVENOUS at 08:07

## 2017-07-23 RX ADMIN — LEVETIRACETAM 500 MG: 5 INJECTION INTRAVENOUS at 08:07

## 2017-07-23 RX ADMIN — PIPERACILLIN AND TAZOBACTAM 4.5 G: 4; .5 INJECTION, POWDER, LYOPHILIZED, FOR SOLUTION INTRAVENOUS; PARENTERAL at 03:07

## 2017-07-23 RX ADMIN — FENTANYL CITRATE 25 MCG: 50 INJECTION INTRAMUSCULAR; INTRAVENOUS at 11:07

## 2017-07-23 RX ADMIN — HYDRALAZINE HYDROCHLORIDE 10 MG: 20 INJECTION INTRAMUSCULAR; INTRAVENOUS at 03:07

## 2017-07-23 RX ADMIN — FAMOTIDINE 20 MG: 20 TABLET, FILM COATED ORAL at 09:07

## 2017-07-23 RX ADMIN — Medication 10 ML: at 12:07

## 2017-07-23 RX ADMIN — SODIUM CHLORIDE TAB 1 GM 1 G: 1 TAB at 09:07

## 2017-07-23 RX ADMIN — FENTANYL CITRATE 25 MCG: 50 INJECTION INTRAMUSCULAR; INTRAVENOUS at 02:07

## 2017-07-23 RX ADMIN — LEVOTHYROXINE SODIUM 25 MCG: 25 TABLET ORAL at 05:07

## 2017-07-23 RX ADMIN — FENTANYL CITRATE 25 MCG: 50 INJECTION INTRAMUSCULAR; INTRAVENOUS at 04:07

## 2017-07-23 RX ADMIN — PIPERACILLIN AND TAZOBACTAM 4.5 G: 4; .5 INJECTION, POWDER, LYOPHILIZED, FOR SOLUTION INTRAVENOUS; PARENTERAL at 08:07

## 2017-07-23 RX ADMIN — LABETALOL HYDROCHLORIDE 10 MG: 5 INJECTION, SOLUTION INTRAVENOUS at 06:07

## 2017-07-23 NOTE — PLAN OF CARE
Problem: Patient Care Overview  Goal: Plan of Care Review  POC reviewed with pt at 0600. Pt unable to verbalize understanding due to clinical understanding. No acute events overnight. Dr. Sheppard was notified this morning at 0600 for pt's elevated SBP above 140. It was determined that the BP cuff wasn't correlating with the Art-line. 10mg Labetalol Q6 PRN was ordered. Pt's potassium was replaced overnight per PRN order. Pt progressing toward goals. Will continue to monitor. See flowsheets for full assessment and VS info

## 2017-07-23 NOTE — PLAN OF CARE
Problem: Patient Care Overview  Goal: Plan of Care Review  Outcome: Ongoing (interventions implemented as appropriate)  POC reviewed with pt 1400. Pt showed no signs on understanding. Questions and concerns addressed. No acute events today. Pt progressing toward goals. Will continue to monitor. See flowsheets for full assessment and VS info.

## 2017-07-23 NOTE — PROGRESS NOTES
[facility]  Neurocritical Care  Progress Note    Admit Date: 7/19/2017  Service Date: 07/23/2017  Length of Stay: 3    Subjective:     Chief Complaint: Nontraumatic intracerebral hemorrhage in hemisphere, unspecified    History of Present Illness: Ms. Malina Robles is 57 year old female with a PMHx of prior stroke,  Asthma, coronary artery disease (S/P Cardiac Surgery), Depression, Emphysema, GERD, Hypertension, thyroid disease, with recent Biliary Stent 7/18,  who presents to Neuro Critical Care as a transfer from [facility]/ Right Temporal ICH. Per the chart,  she was admitted to [facility] from Nursing Home on 7/12/17 with altered mental status and increase jaundice upon admission PT-INR 4.9, , , Ammonia <9 on 7/12. She was on Plavix prior to admit for coronary artery disease which was upon admission.CT of abdomen on 7/13/17 showed 2cm stone distal common duct resulting in moderate biliary ductal dilatation and 3cm infrarenal abdominal aortic aneurysm. On 7/14 Urine culture positive for gram negative rods. She is being treated with IV Ceftriaxone.  7/15 liver were enzymes worsening.   On 7/18/17 GI attempted  ERCP, but could not get stone out. Was able to place stent though with only old bile noted behind the stone. he was started on nicardipine gtt for goal  to 140. Her GCS was 9 to 10, and she was intubated for airway protection prior to arrival at [facility]. She lives at [facility]. No family at bedside. Patient will be admitted to Neuro Critical Care for a higher level of care.            Hospital Course: 7/19 admitted to Neuro Critical Care as a transfer from [facility] Right Temporal ICH 4.2 cm ; recent Biliary Stent 7/18 at outside hospital  Cultured today, consulted GI for repeat of ERCP; started abx, pending CTA  7/20: No issues. Bradycardic with Precedex.  Pending EEG report. CTA with no evidence of AVM.  Gi consult. Repeat ERCP.   7/21: Pending ERCP. Pending. More awake and interactive. Following commands.  Pending PICC line placement. Na 138. Getting HTS 2% 300 cc PRN.  7/22: awake, interactive, poorly following commands. PICC in place. Na 142. Discontinue hypertonic. Send ammonia  7/23: ammonia trending down; not following commands; get EEG    Interval History: no acute adverse events overnight    Review of Systems: Unable to obtain a complete ROS due to level of consciousness.     Vitals:   Temp: 98.9 °F (37.2 °C) (07/23/17 0405)  Pulse: 88 (07/23/17 1605)  Resp: (!) 36 (07/23/17 1605)  BP: (!) 165/90 (07/23/17 1605)  SpO2: 100 % (07/23/17 1605)    Temp:  [98.2 °F (36.8 °C)-99.1 °F (37.3 °C)] 98.9 °F (37.2 °C)  Pulse:  [62-92] 88  Resp:  [17-38] 36  SpO2:  [100 %] 100 %  BP: (110-170)/(58-91) 165/90  Arterial Line BP: ()/(42-87) 110/76         Vent Mode: Spont  Oxygen Concentration (%):  [40] 40  Resp Rate Total:  [13 br/min-50 br/min] 33 br/min  Vt Set:  [400 mL] 400 mL  PEEP/CPAP:  [5 cmH20] 5 cmH20  Pressure Support:  [5 cmH20-8 cmH20] 8 cmH20  Mean Airway Pressure:  [6.8 cmH20-9.8 cmH20] 7.5 cmH20    07/22 0701 - 07/23 0700  In: 4241.8 [I.V.:1689.3]  Out: 2995 [Urine:2745]        Physical Exam:  GA:  comfortable, no acute distress.   HEENT: No scleral icterus or JVD.   Pulmonary: Clear to auscultation A/L. No wheezing, crackles, or rhonchi.  Cardiac: RRR S1 & S2 w/o rubs/murmurs/gallops.   Abdominal: Bowel sounds present x 4. No appreciable hepatosplenomegaly.  Skin: jaundice  Pulses: 1+ Dp bilat     Neuro:  --sedation: none  --GCS: [id number]  --Mental Status: encephalopathic, no commands, doses off if not stimulated continuously   --CN II-XII grossly intact.   --Pupils 3-->2mm, PERRL. Scleral icterus  --brainstem: weak cough and gag  --Motor: 4/5 throughout  --sensory: intact to soft touch and pain throughout  --Reflexes: not tested  --Gait: deferred    Medications:   Continuous  sodium chloride 0.9%  Last Rate: 75 mL/hr at 07/23/17 1605   Scheduled  amlodipine 5 mg Daily   chlorhexidine 15 mL BID   famotidine 20 mg BID   heparin (porcine) 5,000 Units Q12H   lactulose 10 g TID   levetiracetam in NaCl (iso-os) 500 mg Q12H   levothyroxine 25 mcg Before breakfast   piperacillin-tazobactam 4.5 g in dextrose 5 % 100 mL IVPB (ready to mix system) 4.5 g Q8H   sodium chloride 0.9% 10 mL Q6H   sodium chloride 0.9% 3 mL Q8H   sodium chloride 1 g TID   vancomycin (VANCOCIN) IVPB 750 mg Q12H   PRN  sodium chloride  Q24H PRN   fentaNYL 25 mcg Q2H PRN   hydrALAZINE 10 mg Q4H PRN   labetalol 10 mg Q6H PRN   magnesium oxide 800 mg PRN   magnesium oxide 800 mg PRN   potassium chloride 10% 40 mEq PRN   potassium chloride 10% 40 mEq PRN   potassium chloride 10% 60 mEq PRN   potassium, sodium phosphates 2 packet PRN   potassium, sodium phosphates 2 packet PRN   potassium, sodium phosphates 2 packet PRN   sodium chloride 0.9% 10 mL PRN            Assessment/Plan:     Neuro   * Nontraumatic intracerebral hemorrhage in hemisphere, unspecified    --Continue Neuro checks q 1hr  -- Neurosurgery consulted  -- Stroke Service consulted   -- CT Head 7/19 reveals there is a 4.2 cm hyperdense mass seen in the medial right temporal lobe with associated mass effect resulting in sulcal effacement of adjacent sulci.  -- repeat head CT on 7/20 stable  -- SBP goal < 140  -- Na+ goal 140  -- continue Keppra  -- PT/OT/Speech  -- repeat head CT in AM to evaluate bleed and edema  -- EEG to evaluate AMS - no report in chart from previous                Vasogenic cerebral edema    See ICH above        Hepatic coma/encephalopathy    -- ERCP previously aborted  -- ammonia trending down  -- LFTs stable          Cardiac   Essential hypertension    -- SBP goal less 140  -- continue Norvasc 5        Renal   E-coli UTI    -- Treat with 7 day course of Ceftriaxone ; Treatment began at outside hospital prior to arrival   -- repeat UA        Hem/Onc   Leukocytosis     -- Leukocytosis  -- Started empirical abx treatment with Vancomycin /Zosyn for 8 days on 7/19, de-escalate as soon as cx has returned  -- Pan culture pending - sent repeat resp culture yesterday  -- continue broad spec abx while cx's pending        Endocrine   Hypothyroid    -- TSH 5.2  -- Continue Synthroid  -- Endocrinology following        Fluids/Electrolytes/Nutrition/GI   Respiratory alkalosis    On spontaneous - monitor ABG            Prophylaxis:  Venous Thromboembolism: mechanical chemical  Stress Ulcer: H2B  Ventilator Pneumonia: yes     Activity Orders          None        Full Code    Seferino Gutierrez MD  Neurocritical Care  [facility]

## 2017-07-23 NOTE — ASSESSMENT & PLAN NOTE
-- Treat with 7 day course of Ceftriaxone ; Treatment began at outside hospital prior to arrival   -- repeat UA

## 2017-07-23 NOTE — ASSESSMENT & PLAN NOTE
--Continue Neuro checks q 1hr  -- Neurosurgery consulted  -- Stroke Service consulted   -- CT Head 7/19 reveals there is a 4.2 cm hyperdense mass seen in the medial right temporal lobe with associated mass effect resulting in sulcal effacement of adjacent sulci.  -- repeat head CT on 7/20 stable  -- SBP goal < 140  -- Na+ goal 140  -- continue Keppra  -- PT/OT/Speech  -- repeat head CT in AM to evaluate bleed and edema  -- EEG to evaluate AMS - no report in chart from previous

## 2017-07-24 LAB
ALBUMIN SERPL BCP-MCNC: 2.2 G/DL
ALLENS TEST: ABNORMAL
ALP SERPL-CCNC: 332 U/L
ALT SERPL W/O P-5'-P-CCNC: 73 U/L
AMMONIA PLAS-SCNC: 34 UMOL/L
AMYLASE SERPL-CCNC: 44 U/L
ANION GAP SERPL CALC-SCNC: 7 MMOL/L
ANISOCYTOSIS BLD QL SMEAR: SLIGHT
AST SERPL-CCNC: 41 U/L
BACTERIA BLD CULT: NORMAL
BACTERIA BLD CULT: NORMAL
BACTERIA SPEC AEROBE CULT: NORMAL
BASOPHILS # BLD AUTO: ABNORMAL K/UL
BASOPHILS NFR BLD: 0 %
BILIRUB SERPL-MCNC: 4.4 MG/DL
BUN SERPL-MCNC: 5 MG/DL
CALCIUM SERPL-MCNC: 8.1 MG/DL
CHLORIDE SERPL-SCNC: 109 MMOL/L
CO2 SERPL-SCNC: 22 MMOL/L
CREAT SERPL-MCNC: 0.7 MG/DL
DELSYS: ABNORMAL
DIFFERENTIAL METHOD: ABNORMAL
EOSINOPHIL # BLD AUTO: ABNORMAL K/UL
EOSINOPHIL NFR BLD: 2 %
ERYTHROCYTE [DISTWIDTH] IN BLOOD BY AUTOMATED COUNT: 17.1 %
EST. GFR  (AFRICAN AMERICAN): >60 ML/MIN/1.73 M^2
EST. GFR  (NON AFRICAN AMERICAN): >60 ML/MIN/1.73 M^2
FIO2: 40
GLUCOSE SERPL-MCNC: 107 MG/DL
GRAM STN SPEC: NORMAL
GRAM STN SPEC: NORMAL
HCO3 UR-SCNC: 24.7 MMOL/L (ref 24–28)
HCT VFR BLD AUTO: 32.7 %
HGB BLD-MCNC: 10.9 G/DL
HYPOCHROMIA BLD QL SMEAR: ABNORMAL
INR PPP: 1
LIPASE SERPL-CCNC: 54 U/L
LYMPHOCYTES # BLD AUTO: ABNORMAL K/UL
LYMPHOCYTES NFR BLD: 22 %
MAGNESIUM SERPL-MCNC: 1.7 MG/DL
MAGNESIUM SERPL-MCNC: 1.9 MG/DL
MCH RBC QN AUTO: 30.5 PG
MCHC RBC AUTO-ENTMCNC: 33.3 G/DL
MCV RBC AUTO: 92 FL
MIN VOL: 5
MODE: ABNORMAL
MONOCYTES # BLD AUTO: ABNORMAL K/UL
MONOCYTES NFR BLD: 8 %
NEUTROPHILS NFR BLD: 68 %
PCO2 BLDA: 41.3 MMHG (ref 35–45)
PEEP: 5
PH SMN: 7.38 [PH] (ref 7.35–7.45)
PHOSPHATE SERPL-MCNC: 3.6 MG/DL
PLATELET # BLD AUTO: 134 K/UL
PLATELET BLD QL SMEAR: ABNORMAL
PMV BLD AUTO: 9.2 FL
PO2 BLDA: 134 MMHG (ref 80–100)
POC BE: 0 MMOL/L
POC SATURATED O2: 99 % (ref 95–100)
POC TCO2: 26 MMOL/L (ref 23–27)
POCT GLUCOSE: 101 MG/DL (ref 70–110)
POCT GLUCOSE: 102 MG/DL (ref 70–110)
POCT GLUCOSE: 104 MG/DL (ref 70–110)
POCT GLUCOSE: 112 MG/DL (ref 70–110)
POLYCHROMASIA BLD QL SMEAR: ABNORMAL
POTASSIUM SERPL-SCNC: 3.3 MMOL/L
POTASSIUM SERPL-SCNC: 4.6 MMOL/L
PROT SERPL-MCNC: 5.6 G/DL
PROTHROMBIN TIME: 11 SEC
PS: 8
RBC # BLD AUTO: 3.57 M/UL
SAMPLE: ABNORMAL
SITE: ABNORMAL
SODIUM SERPL-SCNC: 137 MMOL/L
SODIUM SERPL-SCNC: 138 MMOL/L
SODIUM SERPL-SCNC: 139 MMOL/L
SP02: 100
SPONT RATE: 19
WBC # BLD AUTO: 10.99 K/UL

## 2017-07-24 PROCEDURE — 97110 THERAPEUTIC EXERCISES: CPT

## 2017-07-24 PROCEDURE — 25000003 PHARM REV CODE 250: Performed by: NURSE PRACTITIONER

## 2017-07-24 PROCEDURE — 99900035 HC TECH TIME PER 15 MIN (STAT)

## 2017-07-24 PROCEDURE — 99900026 HC AIRWAY MAINTENANCE (STAT)

## 2017-07-24 PROCEDURE — 80053 COMPREHEN METABOLIC PANEL: CPT

## 2017-07-24 PROCEDURE — 20000000 HC ICU ROOM

## 2017-07-24 PROCEDURE — 82150 ASSAY OF AMYLASE: CPT

## 2017-07-24 PROCEDURE — 63600175 PHARM REV CODE 636 W HCPCS: Performed by: NURSE PRACTITIONER

## 2017-07-24 PROCEDURE — 97803 MED NUTRITION INDIV SUBSEQ: CPT

## 2017-07-24 PROCEDURE — 25000003 PHARM REV CODE 250: Performed by: PHYSICIAN ASSISTANT

## 2017-07-24 PROCEDURE — 25000003 PHARM REV CODE 250: Performed by: PSYCHIATRY & NEUROLOGY

## 2017-07-24 PROCEDURE — 25000003 PHARM REV CODE 250: Performed by: STUDENT IN AN ORGANIZED HEALTH CARE EDUCATION/TRAINING PROGRAM

## 2017-07-24 PROCEDURE — 83690 ASSAY OF LIPASE: CPT

## 2017-07-24 PROCEDURE — 84295 ASSAY OF SERUM SODIUM: CPT

## 2017-07-24 PROCEDURE — 85007 BL SMEAR W/DIFF WBC COUNT: CPT

## 2017-07-24 PROCEDURE — A4216 STERILE WATER/SALINE, 10 ML: HCPCS | Performed by: NURSE PRACTITIONER

## 2017-07-24 PROCEDURE — 83735 ASSAY OF MAGNESIUM: CPT | Mod: 91

## 2017-07-24 PROCEDURE — 85027 COMPLETE CBC AUTOMATED: CPT

## 2017-07-24 PROCEDURE — 63600175 PHARM REV CODE 636 W HCPCS: Performed by: PHYSICIAN ASSISTANT

## 2017-07-24 PROCEDURE — 85610 PROTHROMBIN TIME: CPT

## 2017-07-24 PROCEDURE — 99291 CRITICAL CARE FIRST HOUR: CPT | Mod: ,,, | Performed by: NURSE PRACTITIONER

## 2017-07-24 PROCEDURE — 99233 SBSQ HOSP IP/OBS HIGH 50: CPT | Mod: ,,, | Performed by: PSYCHIATRY & NEUROLOGY

## 2017-07-24 PROCEDURE — 27000221 HC OXYGEN, UP TO 24 HOURS

## 2017-07-24 PROCEDURE — 82140 ASSAY OF AMMONIA: CPT

## 2017-07-24 PROCEDURE — 84132 ASSAY OF SERUM POTASSIUM: CPT

## 2017-07-24 PROCEDURE — 83735 ASSAY OF MAGNESIUM: CPT

## 2017-07-24 PROCEDURE — A4216 STERILE WATER/SALINE, 10 ML: HCPCS | Performed by: PHYSICIAN ASSISTANT

## 2017-07-24 PROCEDURE — 84100 ASSAY OF PHOSPHORUS: CPT

## 2017-07-24 PROCEDURE — 94150 VITAL CAPACITY TEST: CPT

## 2017-07-24 RX ORDER — HYDRALAZINE HYDROCHLORIDE 20 MG/ML
10 INJECTION INTRAMUSCULAR; INTRAVENOUS EVERY 4 HOURS PRN
Status: DISCONTINUED | OUTPATIENT
Start: 2017-07-24 | End: 2017-07-31 | Stop reason: HOSPADM

## 2017-07-24 RX ORDER — AMLODIPINE BESYLATE 10 MG/1
10 TABLET ORAL DAILY
Status: DISCONTINUED | OUTPATIENT
Start: 2017-07-25 | End: 2017-07-26

## 2017-07-24 RX ORDER — LABETALOL HYDROCHLORIDE 5 MG/ML
10 INJECTION, SOLUTION INTRAVENOUS EVERY 6 HOURS PRN
Status: DISCONTINUED | OUTPATIENT
Start: 2017-07-24 | End: 2017-07-25

## 2017-07-24 RX ORDER — LEVETIRACETAM 5 MG/ML
500 INJECTION INTRAVASCULAR EVERY 12 HOURS
Status: DISCONTINUED | OUTPATIENT
Start: 2017-07-24 | End: 2017-07-27

## 2017-07-24 RX ORDER — FENTANYL CITRATE 50 UG/ML
INJECTION, SOLUTION INTRAMUSCULAR; INTRAVENOUS
Status: DISPENSED
Start: 2017-07-24 | End: 2017-07-24

## 2017-07-24 RX ORDER — FENTANYL CITRATE 50 UG/ML
50 INJECTION, SOLUTION INTRAMUSCULAR; INTRAVENOUS
Status: DISCONTINUED | OUTPATIENT
Start: 2017-07-24 | End: 2017-07-31 | Stop reason: HOSPADM

## 2017-07-24 RX ORDER — FENTANYL CITRATE 50 UG/ML
25 INJECTION, SOLUTION INTRAMUSCULAR; INTRAVENOUS ONCE
Status: COMPLETED | OUTPATIENT
Start: 2017-07-24 | End: 2017-07-24

## 2017-07-24 RX ADMIN — HYDRALAZINE HYDROCHLORIDE 10 MG: 20 INJECTION INTRAMUSCULAR; INTRAVENOUS at 03:07

## 2017-07-24 RX ADMIN — SODIUM CHLORIDE TAB 1 GM 1 G: 1 TAB at 09:07

## 2017-07-24 RX ADMIN — Medication 10 ML: at 05:07

## 2017-07-24 RX ADMIN — LEVETIRACETAM 500 MG: 5 INJECTION INTRAVENOUS at 09:07

## 2017-07-24 RX ADMIN — Medication 3 ML: at 02:07

## 2017-07-24 RX ADMIN — SODIUM CHLORIDE: 0.9 INJECTION, SOLUTION INTRAVENOUS at 04:07

## 2017-07-24 RX ADMIN — SODIUM CHLORIDE TAB 1 GM 1 G: 1 TAB at 01:07

## 2017-07-24 RX ADMIN — AMLODIPINE BESYLATE 5 MG: 5 TABLET ORAL at 08:07

## 2017-07-24 RX ADMIN — FENTANYL CITRATE 25 MCG: 50 INJECTION INTRAMUSCULAR; INTRAVENOUS at 04:07

## 2017-07-24 RX ADMIN — VANCOMYCIN HYDROCHLORIDE 750 MG: 750 INJECTION, POWDER, LYOPHILIZED, FOR SOLUTION INTRAVENOUS at 08:07

## 2017-07-24 RX ADMIN — LACTULOSE 10 G: 20 SOLUTION ORAL at 05:07

## 2017-07-24 RX ADMIN — MAGNESIUM OXIDE TAB 400 MG (241.3 MG ELEMENTAL MG) 800 MG: 400 (241.3 MG) TAB at 08:07

## 2017-07-24 RX ADMIN — PIPERACILLIN AND TAZOBACTAM 4.5 G: 4; .5 INJECTION, POWDER, LYOPHILIZED, FOR SOLUTION INTRAVENOUS; PARENTERAL at 11:07

## 2017-07-24 RX ADMIN — MAGNESIUM OXIDE TAB 400 MG (241.3 MG ELEMENTAL MG) 800 MG: 400 (241.3 MG) TAB at 05:07

## 2017-07-24 RX ADMIN — FAMOTIDINE 20 MG: 20 TABLET, FILM COATED ORAL at 08:07

## 2017-07-24 RX ADMIN — HYDRALAZINE HYDROCHLORIDE 10 MG: 20 INJECTION INTRAMUSCULAR; INTRAVENOUS at 09:07

## 2017-07-24 RX ADMIN — CHLORHEXIDINE GLUCONATE 15 ML: 1.2 RINSE ORAL at 08:07

## 2017-07-24 RX ADMIN — HEPARIN SODIUM 5000 UNITS: 5000 INJECTION, SOLUTION INTRAVENOUS; SUBCUTANEOUS at 09:07

## 2017-07-24 RX ADMIN — PIPERACILLIN AND TAZOBACTAM 4.5 G: 4; .5 INJECTION, POWDER, LYOPHILIZED, FOR SOLUTION INTRAVENOUS; PARENTERAL at 07:07

## 2017-07-24 RX ADMIN — HEPARIN SODIUM 5000 UNITS: 5000 INJECTION, SOLUTION INTRAVENOUS; SUBCUTANEOUS at 08:07

## 2017-07-24 RX ADMIN — CHLORHEXIDINE GLUCONATE 15 ML: 1.2 RINSE ORAL at 09:07

## 2017-07-24 RX ADMIN — LEVETIRACETAM 500 MG: 5 INJECTION INTRAVENOUS at 12:07

## 2017-07-24 RX ADMIN — FAMOTIDINE 20 MG: 20 TABLET, FILM COATED ORAL at 09:07

## 2017-07-24 RX ADMIN — FENTANYL CITRATE 25 MCG: 50 INJECTION INTRAMUSCULAR; INTRAVENOUS at 07:07

## 2017-07-24 RX ADMIN — Medication 10 ML: at 11:07

## 2017-07-24 RX ADMIN — Medication 3 ML: at 05:07

## 2017-07-24 RX ADMIN — LACTULOSE 10 G: 20 SOLUTION ORAL at 01:07

## 2017-07-24 RX ADMIN — PIPERACILLIN AND TAZOBACTAM 4.5 G: 4; .5 INJECTION, POWDER, LYOPHILIZED, FOR SOLUTION INTRAVENOUS; PARENTERAL at 03:07

## 2017-07-24 RX ADMIN — Medication 3 ML: at 09:07

## 2017-07-24 RX ADMIN — POTASSIUM CHLORIDE 40 MEQ: 20 SOLUTION ORAL at 07:07

## 2017-07-24 RX ADMIN — FENTANYL CITRATE 25 MCG: 50 INJECTION, SOLUTION INTRAMUSCULAR; INTRAVENOUS at 08:07

## 2017-07-24 RX ADMIN — POTASSIUM CHLORIDE 40 MEQ: 20 SOLUTION ORAL at 05:07

## 2017-07-24 RX ADMIN — SODIUM CHLORIDE TAB 1 GM 1 G: 1 TAB at 05:07

## 2017-07-24 RX ADMIN — FENTANYL CITRATE 25 MCG: 50 INJECTION INTRAMUSCULAR; INTRAVENOUS at 02:07

## 2017-07-24 RX ADMIN — VANCOMYCIN HYDROCHLORIDE 750 MG: 750 INJECTION, POWDER, LYOPHILIZED, FOR SOLUTION INTRAVENOUS at 07:07

## 2017-07-24 RX ADMIN — HYDRALAZINE HYDROCHLORIDE 10 MG: 20 INJECTION INTRAMUSCULAR; INTRAVENOUS at 06:07

## 2017-07-24 RX ADMIN — LABETALOL HYDROCHLORIDE 10 MG: 5 INJECTION, SOLUTION INTRAVENOUS at 05:07

## 2017-07-24 RX ADMIN — HYDRALAZINE HYDROCHLORIDE 10 MG: 20 INJECTION INTRAMUSCULAR; INTRAVENOUS at 10:07

## 2017-07-24 RX ADMIN — LACTULOSE 10 G: 20 SOLUTION ORAL at 09:07

## 2017-07-24 RX ADMIN — LEVOTHYROXINE SODIUM 25 MCG: 25 TABLET ORAL at 05:07

## 2017-07-24 NOTE — PT/OT/SLP PROGRESS
"Physical Therapy  Treatment    Malina Robles   MRN: [id number]   Admitting Diagnosis: Nontraumatic intracerebral hemorrhage in hemisphere, unspecified    PT Received On: 07/24/17  PT Start Time: 1126     PT Stop Time: 1201    PT Total Time (min): 35 min       Billable Minutes:  Therapeutic Exercise 35    Treatment Type: Treatment  PT/PTA: PTA     PTA Visit Number: 1       General Precautions: Standard, aspiration, fall, NPO, seizure  Orthopedic Precautions: N/A   Braces: N/A         Subjective:  Communicated with RN (Dominga) prior to session.  Pt nods "yes" with head when spoken to.  Pt not following commands      Pain/Comfort  Pain Rating 1: 0/10  Pain Rating Post-Intervention 1: 0/10    Objective:   Patient found with: arterial line, blood pressure cuff, bowel management system, jenkins catheter, pressure relief boots, peripheral IV, PICC line, pulse ox (continuous), restraints, SCD, telemetry, ventilator (OG tube.  Pt found sup in bed with no family present)   2 attempts for tx session 2* pt soiled with BM, increased BP and agitated at 9:32 am        FUNCTIONAL MOBILITY    Bed Mobility        Pt was dependent of 2 helpers to scoot to HOB via drawsheet x1 scoot      THERAPEUTIC EXERCISES         Pt receives PROM to B LE sup in bed (ankle DF, HSs, hip add/abd, hip IR/ER)        x10 reps            Pt receives PROM to B UE sup in bed (wrist flex/ext, elbow flex/ext, shoulder abd, shoulder       Flex with scapular support) x10 reps           Education:  Education provided to pt regarding: ROM ther ex's. Verbalized understanding.     Whiteboard updated with correct mobility information. RN/PCT notified.  Pt safe to sit at the EOB with therapy ONLY at this time.        AM-PAC 6 CLICK MOBILITY  How much help from another person does this patient currently need?   1 = Unable, Total/Dependent Assistance  2 = A lot, Maximum/Moderate Assistance  3 = A little, Minimum/Contact Guard/Supervision  4 = None, Modified " [place]/Independent    Turning over in bed (including adjusting bedclothes, sheets and blankets)?: 2  Sitting down on and standing up from a chair with arms (e.g., wheelchair, bedside commode, etc.): 1  Moving from lying on back to sitting on the side of the bed?: 2  Moving to and from a bed to a chair (including a wheelchair)?: 1  Need to walk in hospital room?: 1  Climbing 3-5 steps with a railing?: 1  Total Score: 8    AM-PAC Raw Score CMS G-Code Modifier Level of Impairment Assistance   6 % Total / Unable   7 - 9 CM 80 - 100% Maximal Assist   10 - 14 CL 60 - 80% Moderate Assist   15 - 19 CK 40 - 60% Moderate Assist   20 - 22 CJ 20 - 40% Minimal Assist   23 CI 1-20% SBA / CGA   24 CH 0% Independent/ Mod I     Patient left supine with all lines intact, call button in reach, restraints reapplied at end of session and RN notified.    Assessment:  Malina Robles is a 57 y.o. female with a medical diagnosis of Nontraumatic intracerebral hemorrhage in hemisphere, unspecified and presents with R HP, increased tone and cognitive deficits requiring significant assistance for bed mob.  Pt will cont to benefit from skilled PT intervention to address deficits and improve functional mobility.     Rehab identified problem list/impairments: Rehab identified problem list/impairments: weakness, impaired endurance, impaired sensation, impaired self care skills, impaired functional mobilty, impaired balance, impaired cognition, decreased coordination, decreased upper extremity function, decreased lower extremity function, decreased safety awareness, abnormal tone, decreased ROM, impaired coordination, impaired fine motor    Rehab potential is fair.    Activity tolerance: Fair    Discharge recommendations: Discharge Facility/Level Of Care Needs:  (return to NH)     Barriers to discharge: Barriers to Discharge: None    Equipment recommendations: Equipment Needed After Discharge: none     GOALS:    Physical Therapy  Goals        Problem: Physical Therapy Goal    Goal Priority Disciplines Outcome Goal Variances Interventions   Physical Therapy Goal     PT/OT, PT Ongoing (interventions implemented as appropriate)     Description:  Goals to be met by: 8/3/2017     Patient will increase functional independence with mobility by performin. Pt will participate in bilateral UE and LE ROM exercises on 3 consecutive visits with no change in vitals.   2. Pt will perform rolling R and L with moderate assistance  3. Supine to sit with moderate assistance   4. Sit to supine with moderate assistance   5.  Pt will tolerate sitting EOB x 10 minutes with moderate assistance                       PLAN:    Patient to be seen 6 x/week  to address the above listed problems via therapeutic activities, therapeutic exercises, neuromuscular re-education  Plan of Care expires: 17  Plan of Care reviewed with: patient         Swapna Davis, PTA  2017

## 2017-07-24 NOTE — PLAN OF CARE
Problem: Patient Care Overview  Goal: Plan of Care Review  Outcome: Ongoing (interventions implemented as appropriate)  POC reviewed with pt at 1400. Pt verbalized understanding. Questions and concerns addressed. No acute events today. Possible extubation today. Pt progressing toward goals. Will continue to monitor. See flowsheets for full assessment and VS info.

## 2017-07-24 NOTE — PROGRESS NOTES
Extubation trial  EEG pending.  May need to dc keppra for improvement in mental status. Currently awake but somnolent. Awakes to arousal.

## 2017-07-24 NOTE — PHYSICIAN QUERY
PT Name: Malina Robles  MR #: [id number]    Physician Query Form -Present on Admission (POA) Diagnosis Clarification     CDS/: Brandy E Capley               Contact information: Ext.  [phone]    This form is a permanent document in the medical record.     Query Date: July 24, 2017    By submitting this query, we are merely seeking further clarification of documentation. Please utilize your independent clinical judgment when addressing the question(s) below.       The Medical record contains the following:    Diagnosis      Supporting Clinical Information   Location in Medical Record   Acute blood loss anemia        2U PRBC   Monitor HH   No signs of bleeding                       H/H= 9.5/27.4 --> 8/23.8 --> 7.1/22.3 --> 11.3/33.2    Transferred from [facility] and then [facility]. Was pending to get ERCP.  Has decline in MS and got CT head showing R midtemporal ICH Neuro CC progress note 7/22          Labs 7/20 --> 7/21 --> 7/22 --> 7/23    H&P 7/19         Doctor, Please specify Present On Admission (POA) status of ___Acute blood loss anemia________.    [  ] Present on Admission   [ x ] Not Present on Admission  [  ] Clinically undetermined

## 2017-07-24 NOTE — ASSESSMENT & PLAN NOTE
-- Treat with 7 day course of Ceftriaxone ; Treatment began at outside hospital prior to arrival

## 2017-07-24 NOTE — PROGRESS NOTES
[facility]  Neurocritical Care  Progress Note    Admit Date: 7/19/2017  Service Date: 07/24/2017  Length of Stay: 4    Subjective:     Chief Complaint: Nontraumatic intracerebral hemorrhage in hemisphere, unspecified    History of Present Illness: Ms. Malina Robles is 57 year old female with a PMHx of prior stroke,  Asthma, coronary artery disease (S/P Cardiac Surgery), Depression, Emphysema, GERD, Hypertension, thyroid disease, with recent Biliary Stent 7/18,  who presents to Neuro Critical Care as a transfer from [facility]/ Right Temporal ICH. Per the chart,  she was admitted to [facility] from Nursing Home on 7/12/17 with altered mental status and increase jaundice upon admission PT-INR 4.9, , , Ammonia <9 on 7/12. She was on Plavix prior to admit for coronary artery disease which was upon admission.CT of abdomen on 7/13/17 showed 2cm stone distal common duct resulting in moderate biliary ductal dilatation and 3cm infrarenal abdominal aortic aneurysm. On 7/14 Urine culture positive for gram negative rods. She is being treated with IV Ceftriaxone.  7/15 liver were enzymes worsening.   On 7/18/17 GI attempted  ERCP, but could not get stone out. Was able to place stent though with only old bile noted behind the stone. he was started on nicardipine gtt for goal  to 140. Her GCS was 9 to 10, and she was intubated for airway protection prior to arrival at [facility]. She lives at [facility]. No family at bedside. Patient will be admitted to Neuro Critical Care for a higher level of care.            Hospital Course: 7/19 admitted to Neuro Critical Care as a transfer from [facility] Right Temporal ICH 4.2 cm ; recent Biliary Stent 7/18 at outside hospital  Cultured today, consulted GI for repeat of ERCP; started abx, pending CTA  7/20: No issues. Bradycardic with Precedex.  Pending EEG report. CTA with no evidence of AVM.  Gi consult. Repeat ERCP.   7/21: Pending ERCP. Pending. More awake and interactive. Following commands.  Pending PICC line placement. Na 138. Getting HTS 2% 300 cc PRN.  7/22: awake, interactive, poorly following commands. PICC in place. Na 142. Discontinue hypertonic. Send ammonia  7/23: ammonia trending down; not following commands; get EEG  7/24 Awaiting EEG, SBP parameters increased to <160, getting weaning parameters to assess for extabation    Interval History:    -Resp mechanics to assess for extubation  -SBP <160  -Stop ABX after 7 days    Review of Systems   Unable to perform ROS: Intubated       Objective:     Vitals:  Temp: 97.9 °F (36.6 °C) (07/24/17 1100)  Pulse: 89 (07/24/17 1400)  Resp: (!) 24 (07/24/17 1400)  BP: (!) 155/89 (07/24/17 1400)  SpO2: 99 % (07/24/17 1400)    Temp:  [97.8 °F (36.6 °C)-99.2 °F (37.3 °C)] 97.9 °F (36.6 °C)  Pulse:  [62-98] 89  Resp:  [17-46] 24  SpO2:  [99 %-100 %] 99 %  BP: (105-184)/() 155/89  Arterial Line BP: (102-165)/() 123/83         Vent Mode: Spont  Oxygen Concentration (%):  [40] 40  Resp Rate Total:  [13 br/min-36 br/min] 24 br/min  Vt Set:  [400 mL] 400 mL  PEEP/CPAP:  [5 cmH20] 5 cmH20  Pressure Support:  [8 cmH20] 8 cmH20  Mean Airway Pressure:  [7 cmH20-7.9 cmH20] 7.1 cmH20    07/23 0701 - 07/24 0700  In: 3659.8 [I.V.:1829.8]  Out: 2682 [Urine:2682]    Physical Exam   HENT:   Head: Normocephalic and atraumatic.   Cardiovascular: Normal rate, regular rhythm, normal heart sounds and intact distal pulses.    Pulmonary/Chest: Effort normal and breath sounds normal.   Abdominal: Soft. Bowel sounds are normal.   Neurological:   E4 V1t M4  Awake, attends  PERRLA  Does not follow commands,  Cough/gag intact  LORD, withdraws to pain all extremites   Skin: Skin is warm and dry. Capillary refill takes 2 to 3 seconds.   Vitals reviewed.        Medications:  Continuous  sodium chloride 0.9% Last Rate: 75 mL/hr at 07/24/17 1400   Scheduled  amlodipine 5 mg Daily    chlorhexidine 15 mL BID   famotidine 20 mg BID   fentaNYL     heparin (porcine) 5,000 Units Q12H   lactulose 10 g TID   levetiracetam in NaCl (iso-os) 500 mg Q12H   levothyroxine 25 mcg Before breakfast   piperacillin-tazobactam 4.5 g in dextrose 5 % 100 mL IVPB (ready to mix system) 4.5 g Q8H   sodium chloride 0.9% 10 mL Q6H   sodium chloride 0.9% 3 mL Q8H   sodium chloride 1 g TID   vancomycin (VANCOCIN) IVPB 750 mg Q12H   PRN  fentaNYL 50 mcg Q2H PRN   hydrALAZINE 10 mg Q4H PRN   labetalol 10 mg Q6H PRN   magnesium oxide 800 mg PRN   magnesium oxide 800 mg PRN   potassium chloride 10% 40 mEq PRN   potassium chloride 10% 40 mEq PRN   potassium chloride 10% 60 mEq PRN   potassium, sodium phosphates 2 packet PRN   potassium, sodium phosphates 2 packet PRN   potassium, sodium phosphates 2 packet PRN   sodium chloride 0.9% 10 mL PRN     Today I personally reviewed pertinent medications, lines/drains/airways, imaging, cardiology, lab results, microbiology results,         Assessment/Plan:     Neuro   * Nontraumatic intracerebral hemorrhage in hemisphere, unspecified    --Continue Neuro checks q 1hr  -- Neurosurgery consulted  -- Stroke Service consulted   -- CT Head 7/19 reveals there is a 4.2 cm hyperdense mass seen in the medial right temporal lobe with associated mass effect resulting in sulcal effacement of adjacent sulci.  -- repeat head CT on 7/20 stable  -- SBP goal < 160  -- Na+ goal 140  -- continue Keppra  -- PT/OT/Speech  -- EEG to evaluate AMS - no report in chart from previous                Hepatic coma/encephalopathy    -- ERCP previously aborted  -- ammonia trending down  -- LFTs stable          Cardiac   Essential hypertension    -- SBP goal less 160  -- Norvasc increased to 10  -EF 60%        Renal   E-coli UTI    -- Treat with 7 day course of Ceftriaxone ; Treatment began at outside hospital prior to arrival           Hem/Onc   Leukocytosis    -- Leukocytosis  -- Started empirical abx treatment  with Vancomycin /Zosyn for 7 days on 7/19,   -- Pan culture pending - sent repeat resp culture yesterday  -- continue broad spec abx while cx's pending  -Plan for 7 day course        Endocrine   Hypothyroid    -- TSH 5.2  -- Continue Synthroid  -- Endocrinology following            Prophylaxis:  Venous Thromboembolism: mechanical chemical  Stress Ulcer: H2B  Ventilator Pneumonia: yes     Activity Orders          None        Full Code   Critical Care: 36min      Fran Pan NP  Neurocritical Care  [facility]

## 2017-07-24 NOTE — ASSESSMENT & PLAN NOTE
--Continue Neuro checks q 1hr  -- Neurosurgery consulted  -- Stroke Service consulted   -- CT Head 7/19 reveals there is a 4.2 cm hyperdense mass seen in the medial right temporal lobe with associated mass effect resulting in sulcal effacement of adjacent sulci.  -- repeat head CT on 7/20 stable  -- SBP goal < 160  -- Na+ goal 140  -- continue Keppra  -- PT/OT/Speech  -- EEG to evaluate AMS - no report in chart from previous

## 2017-07-24 NOTE — PLAN OF CARE
07/24/17 1129   Discharge Reassessment   Assessment Type Discharge Planning Reassessment   Can the patient answer the patient profile reliably? No, cognitively impaired   How does the patient rate their overall health at the present time? (carri)   Describe the patient's ability to walk at the present time. Does not walk or unable to take any steps at all   How often would a person be available to care for the patient? Whenever needed  (Nursing Home resident)   Number of comorbid conditions (as recorded on the chart) Five or more   During the past month, has the patient often been bothered by feeling down, depressed or hopeless? (carri)   During the past month, has the patient often been bothered by little interest or pleasure in doing things? (carri)   Discharge plan remains the same: Yes   Discharge Plan A Return to nursing home   Discharge Plan B New Nursing Home placement - shelter care facility  (if unable to wean vent)   Change in patient condition or support system No     Patient intubated on vent.  Not medically stable for discharge.   Resident of [facility].      Eli Sanchez RN, CCRN-K, [facility]  Neuro-Critical Care   [phone]

## 2017-07-24 NOTE — SUBJECTIVE & OBJECTIVE
Neurologic Chief Complaint: R temporal ICH    Subjective:     Interval History: Patient is seen for follow-up neurological assessment and treatment recommendations: NAEON, remains intubated, more alert but not following commands    HPI, Past Medical, Family, and Social History remains the same as documented in the initial encounter.     Review of Systems   Constitutional: Negative for fever.   Respiratory:        Intubated   Cardiovascular: Negative for leg swelling.   Gastrointestinal: Negative for vomiting.   Skin: Positive for color change.   Neurological: Positive for speech difficulty.   Psychiatric/Behavioral: Negative for agitation.     Scheduled Meds:   amlodipine  5 mg Per OG tube Daily    chlorhexidine  15 mL Mouth/Throat BID    famotidine  20 mg Per NG tube BID    fentaNYL        heparin (porcine)  5,000 Units Subcutaneous Q12H    lactulose  10 g Per NG tube TID    levetiracetam in NaCl (iso-os)  500 mg Intravenous Q12H    levothyroxine  25 mcg Per OG tube Before breakfast    piperacillin-tazobactam 4.5 g in dextrose 5 % 100 mL IVPB (ready to mix system)  4.5 g Intravenous Q8H    sodium chloride 0.9%  10 mL Intravenous Q6H    sodium chloride 0.9%  3 mL Intravenous Q8H    sodium chloride  1 g Oral TID    vancomycin (VANCOCIN) IVPB  750 mg Intravenous Q12H     Continuous Infusions:   sodium chloride 0.9% Stopped (07/24/17 0735)     PRN Meds:sodium chloride, fentaNYL, hydrALAZINE, labetalol, magnesium oxide, magnesium oxide, potassium chloride 10%, potassium chloride 10%, potassium chloride 10%, potassium, sodium phosphates, potassium, sodium phosphates, potassium, sodium phosphates, Flushing PICC Protocol **AND** sodium chloride 0.9% **AND** sodium chloride 0.9%    Objective:     Vital Signs (Most Recent):  Temp: 97.8 °F (36.6 °C) (07/24/17 0701)  Pulse: 75 (07/24/17 0800)  Resp: 19 (07/24/17 0800)  BP: (!) 154/96 (07/24/17 0858)  SpO2: 100 % (07/24/17 0800)  BP Location: Left arm    Vital  Signs Range (Last 24H):  Temp:  [97.8 °F (36.6 °C)-99.2 °F (37.3 °C)]   Pulse:  [62-98]   Resp:  [17-46]   BP: (105-184)/(61-97)   SpO2:  [100 %]   Arterial Line BP: (102-165)/()   BP Location: Left arm    Physical Exam   Constitutional: She appears well-developed and well-nourished. No distress.   HENT:   Head: Normocephalic and atraumatic.   Eyes: EOM are normal. Pupils are equal, round, and reactive to light.   Scleral icterus   Cardiovascular: Normal rate.    Pulmonary/Chest: No respiratory distress.   intubated   Abdominal: Soft.   Neurological:   Drowsy, does not follow commands   Skin: Skin is warm and dry.   Jaundice skin   Vitals reviewed.      Neurological Exam:   LOC: drowsy, intubated  Language: Intubated  Speech: Intubated  Orientation: Intubated  Pupils (CN III, IV, VI): PERRL  Motor*: moves all extremities spontaneously, strength 3/5 in all 4 limbs; does not move to command.   Increased tone bilaterally     NIH Stroke Scale:    Level of Consciousness: 1 - drowsy  LOC Questions: 2 - answers none correctly  LOC Commands: 2 - performs neither correctly  Best Gaze: 0 - normal  Visual: 0 - no visual loss  Facial Palsy: 0 - normal  Motor Left Arm: 2 - can't resist gravity  Motor Right Arm: 2 - can't resist gravity  Motor Left Le - can't resist gravity  Motor Right Le - can't resist gravity  Limb Ataxia: 0 - absent  Sensory: 0 - normal  Best Language: 3 - mute  Dysarthria: UN - intubated or other barrier  Extinction and Inattention: 0 - no neglect  NIH Stroke Scale Total: 16      Laboratory:  CMP:     Recent Labs  Lab 172 17  075   CALCIUM 8.1*  --    ALBUMIN 2.2*  --    PROT 5.6*  --     139   K 3.3*  --    CO2 22*  --      --    BUN 5*  --    CREATININE 0.7  --    ALKPHOS 332*  --    ALT 73*  --    AST 41*  --    BILITOT 4.4*  --      CBC:     Recent Labs  Lab 17   WBC 10.99   RBC 3.57*   HGB 10.9*   HCT 32.7*   *   MCV 92   MCH 30.5   MCHC  33.3     Lipid Panel:     Recent Labs  Lab 07/19/17  1449   CHOL 420*   LDLCALC 342.0*   HDL 11*   TRIG 335*     Coagulation:   Recent Labs  Lab 07/18/17  2212  07/24/17  0322   INR 1.0  < > 1.0   APTT 25.3  --   --    < > = values in this interval not displayed.  Platelet Aggregation Study: No results for input(s): PLTAGG, PLTAGINTERP, PLTAGREGLACO, ADPPLTAGGREG in the last 168 hours.  Hgb A1C:     Recent Labs  Lab 07/19/17  1449   HGBA1C 4.6     TSH:     Recent Labs  Lab 07/19/17  1449   TSH 3.018       Diagnostic Results:  I have personally reviewed:   Findings:     CT Head. Date: 07/19/17  There are CT findings of a large, acute intracranial hemorrhage involving the medial right temporal lobe with evidence of mass effect as described above.    CTA Head/Neck. Date: 07/19/17  Acute right temporal intraparenchymal hemorrhage grossly stable to the prior exam with persistent edema and localized mass effect. No significant hydrocephalus or midline shift.    Scattered atherosclerosis of the bilateral ICAs resulting in 50% stenosis of the right cavernous ICA. No large vessel occlusion or aneurysm identified.    Age advanced senescent changes.    Remote right basal ganglia and left cerebral and cerebellar infarcts, unchanged from examination performed earlier today.    CT Head. Date: 07/24/17  No interval detrimental change when compared to CT dated 07/20/2017.    Unchanged right temporal lobe parenchymal hemorrhage with stable surrounding vasogenic edema and local mass effect. No midline shift.  No new intra-or extra-axial hemorrhage.    Stable chronic findings as above.

## 2017-07-24 NOTE — PROGRESS NOTES
GA Peters notified of pt SBP >140.  Notified that pt ranges in 140s, average 145.  Pt received PRN Labetolol, Hydralazine and Fentanyl.  Remains agitated.  No new orders given, will continue to monitor and discuss further during rounds.    Maintain pt SBP <160 per GA Peters

## 2017-07-24 NOTE — PROGRESS NOTES
[facility]  Vascular Neurology  Comprehensive Stroke Center  Progress Note    Assessment/Plan:     7/19: AMS this AM. CT head shows R temporal ICH with IVH. Transferred to [facility] ED and admitted to [facility]. Of note, patient's initial INR was 4.9.     07/20: remains intubated and on cardene, patient not sedated. See by GI for bile duct stone and plans to repeat ERCP. Patient with leukocytosis-cultures pending, on vanc/zosyn.   7/21/17 Remains intubated. CTA without abnormality to explain ICH.   07/24/17 intubated, neurologically stable, more alert but not following commands, likely with eyelid opening apraxia, remains encephalopathic but unclear whether it's related to hepatic impairment or ICH    * Nontraumatic intracerebral hemorrhage in hemisphere, unspecified    57 y.o. female PMH hepatitis C with transaminitis, CAD, thyroid disease, and h/o CVA on ASA, plavix. CT with R temporal ICH. CTA without evidence of etiology and ECHO EF 60, normal atrial size. Pending MRI.      Antiplatelets: None - hemorrhage  Statins: None - hemorrhage - though consider statin as LDL is 342  SBP < 140  Diagnostics: MRI brain. Pending EEG  DVT ppx: TEDs, SCDs  PT/OT and speech to evaluate and treat  Neuro checks qh        Vasogenic cerebral edema    2/2 ICH  Evident on imaging        Essential hypertension    - Stroke risk factor.  - Management per primary team  - SBP<140 in setting of bleed        Elevated liver enzymes    AST/ALT improving   Management per primary team  INR 4.9 previously; now normal at 1.0        Jaundice    Patient with history of hep C and with current bile duct stone, had stent placed at OSH  GI following            Neurologic Chief Complaint: R temporal ICH    Subjective:     Interval History: Patient is seen for follow-up neurological assessment and treatment recommendations: NAEON, remains intubated, more alert but not following commands    HPI, Past Medical, Family, and Social History remains the  same as documented in the initial encounter.     Review of Systems   Constitutional: Negative for fever.   Respiratory:        Intubated   Cardiovascular: Negative for leg swelling.   Gastrointestinal: Negative for vomiting.   Skin: Positive for color change.   Neurological: Positive for speech difficulty.   Psychiatric/Behavioral: Negative for agitation.     Scheduled Meds:   amlodipine  5 mg Per OG tube Daily    chlorhexidine  15 mL Mouth/Throat BID    famotidine  20 mg Per NG tube BID    fentaNYL        heparin (porcine)  5,000 Units Subcutaneous Q12H    lactulose  10 g Per NG tube TID    levetiracetam in NaCl (iso-os)  500 mg Intravenous Q12H    levothyroxine  25 mcg Per OG tube Before breakfast    piperacillin-tazobactam 4.5 g in dextrose 5 % 100 mL IVPB (ready to mix system)  4.5 g Intravenous Q8H    sodium chloride 0.9%  10 mL Intravenous Q6H    sodium chloride 0.9%  3 mL Intravenous Q8H    sodium chloride  1 g Oral TID    vancomycin (VANCOCIN) IVPB  750 mg Intravenous Q12H     Continuous Infusions:   sodium chloride 0.9% Stopped (07/24/17 0735)     PRN Meds:sodium chloride, fentaNYL, hydrALAZINE, labetalol, magnesium oxide, magnesium oxide, potassium chloride 10%, potassium chloride 10%, potassium chloride 10%, potassium, sodium phosphates, potassium, sodium phosphates, potassium, sodium phosphates, Flushing PICC Protocol **AND** sodium chloride 0.9% **AND** sodium chloride 0.9%    Objective:     Vital Signs (Most Recent):  Temp: 97.8 °F (36.6 °C) (07/24/17 0701)  Pulse: 75 (07/24/17 0800)  Resp: 19 (07/24/17 0800)  BP: (!) 154/96 (07/24/17 0858)  SpO2: 100 % (07/24/17 0800)  BP Location: Left arm    Vital Signs Range (Last 24H):  Temp:  [97.8 °F (36.6 °C)-99.2 °F (37.3 °C)]   Pulse:  [62-98]   Resp:  [17-46]   BP: (105-184)/(61-97)   SpO2:  [100 %]   Arterial Line BP: (102-165)/()   BP Location: Left arm    Physical Exam   Constitutional: She appears well-developed and well-nourished. No  distress.   HENT:   Head: Normocephalic and atraumatic.   Eyes: EOM are normal. Pupils are equal, round, and reactive to light.   Scleral icterus   Cardiovascular: Normal rate.    Pulmonary/Chest: No respiratory distress.   intubated   Abdominal: Soft.   Neurological:   Drowsy, does not follow commands   Skin: Skin is warm and dry.   Jaundice skin   Vitals reviewed.      Neurological Exam:   LOC: drowsy, intubated  Language: Intubated  Speech: Intubated  Orientation: Intubated  Pupils (CN III, IV, VI): PERRL  Motor*: moves all extremities spontaneously, strength 3/5 in all 4 limbs; does not move to command.   Increased tone bilaterally     NIH Stroke Scale:    Level of Consciousness: 1 - drowsy  LOC Questions: 2 - answers none correctly  LOC Commands: 2 - performs neither correctly  Best Gaze: 0 - normal  Visual: 0 - no visual loss  Facial Palsy: 0 - normal  Motor Left Arm: 2 - can't resist gravity  Motor Right Arm: 2 - can't resist gravity  Motor Left Le - can't resist gravity  Motor Right Le - can't resist gravity  Limb Ataxia: 0 - absent  Sensory: 0 - normal  Best Language: 3 - mute  Dysarthria: UN - intubated or other barrier  Extinction and Inattention: 0 - no neglect  NIH Stroke Scale Total: 16      Laboratory:  CMP:     Recent Labs  Lab 17  0755   CALCIUM 8.1*  --    ALBUMIN 2.2*  --    PROT 5.6*  --     139   K 3.3*  --    CO2 22*  --      --    BUN 5*  --    CREATININE 0.7  --    ALKPHOS 332*  --    ALT 73*  --    AST 41*  --    BILITOT 4.4*  --      CBC:     Recent Labs  Lab 17  0322   WBC 10.99   RBC 3.57*   HGB 10.9*   HCT 32.7*   *   MCV 92   MCH 30.5   MCHC 33.3     Lipid Panel:     Recent Labs  Lab 17  1449   CHOL 420*   LDLCALC 342.0*   HDL 11*   TRIG 335*     Coagulation:   Recent Labs  Lab 17  2212  17  0322   INR 1.0  < > 1.0   APTT 25.3  --   --    < > = values in this interval not displayed.  Platelet Aggregation Study: No  results for input(s): PLTAGG, PLTAGINTERP, PLTAGREGLACO, ADPPLTAGGREG in the last 168 hours.  Hgb A1C:     Recent Labs  Lab 07/19/17  1449   HGBA1C 4.6     TSH:     Recent Labs  Lab 07/19/17  1449   TSH 3.018       Diagnostic Results:  I have personally reviewed:   Findings:     CT Head. Date: 07/19/17  There are CT findings of a large, acute intracranial hemorrhage involving the medial right temporal lobe with evidence of mass effect as described above.    CTA Head/Neck. Date: 07/19/17  Acute right temporal intraparenchymal hemorrhage grossly stable to the prior exam with persistent edema and localized mass effect. No significant hydrocephalus or midline shift.    Scattered atherosclerosis of the bilateral ICAs resulting in 50% stenosis of the right cavernous ICA. No large vessel occlusion or aneurysm identified.    Age advanced senescent changes.    Remote right basal ganglia and left cerebral and cerebellar infarcts, unchanged from examination performed earlier today.    CT Head. Date: 07/24/17  No interval detrimental change when compared to CT dated 07/20/2017.    Unchanged right temporal lobe parenchymal hemorrhage with stable surrounding vasogenic edema and local mass effect. No midline shift.  No new intra-or extra-axial hemorrhage.    Stable chronic findings as above.          Laura Dumas PA-C  Comprehensive Stroke Center  Department of Vascular Neurology   [facility]

## 2017-07-24 NOTE — PLAN OF CARE
SW spoke with admissions staff for [place] [facility] ([phone]) regarding update on Pt. Gave this SW contact information if more updates are needed.    Jacquelin Degroot LMSW  Neurocritical Care   [facility]  [phone]

## 2017-07-24 NOTE — TREATMENT PLAN
GI Treatment Plan  07/23/2017  9:33 PM    Patient seen on Saturday and Sunday. She remains intubated and sedated. Abdominal exam didn't reveal any acute changes although due to patients mentation/seadaation it is difficult to assess.    Her bilirubin took a minimal bump this morning and will therefore continue to monitor.    Maurice Andrade M.D.  Gastroenterology Fellow, PGY-IV  Pager: [phone]  [facility]

## 2017-07-24 NOTE — PLAN OF CARE
Problem: Patient Care Overview  Goal: Plan of Care Review  POC reviewed with pt at 0530. Pt unable to verbalize understanding due to current clinical condition. No acute events overnight. Pt transported to CT with no complications except for an elevated SBP. PRN labetalol and hydralazine was given on several occasions per order overnight. Potassium and magnesium was replaced per order.  Pt progressing toward goals. Will continue to monitor. See flowsheets for full assessment and VS info

## 2017-07-24 NOTE — SUBJECTIVE & OBJECTIVE
Interval History:    -Resp mechanics to assess for extubation  -SBP <160  -Stop ABX after 7 days    Review of Systems   Unable to perform ROS: Intubated       Objective:     Vitals:  Temp: 97.9 °F (36.6 °C) (07/24/17 1100)  Pulse: 89 (07/24/17 1400)  Resp: (!) 24 (07/24/17 1400)  BP: (!) 155/89 (07/24/17 1400)  SpO2: 99 % (07/24/17 1400)    Temp:  [97.8 °F (36.6 °C)-99.2 °F (37.3 °C)] 97.9 °F (36.6 °C)  Pulse:  [62-98] 89  Resp:  [17-46] 24  SpO2:  [99 %-100 %] 99 %  BP: (105-184)/() 155/89  Arterial Line BP: (102-165)/() 123/83         Vent Mode: Spont  Oxygen Concentration (%):  [40] 40  Resp Rate Total:  [13 br/min-36 br/min] 24 br/min  Vt Set:  [400 mL] 400 mL  PEEP/CPAP:  [5 cmH20] 5 cmH20  Pressure Support:  [8 cmH20] 8 cmH20  Mean Airway Pressure:  [7 cmH20-7.9 cmH20] 7.1 cmH20    07/23 0701 - 07/24 0700  In: 3659.8 [I.V.:1829.8]  Out: 2682 [Urine:2682]    Physical Exam   HENT:   Head: Normocephalic and atraumatic.   Cardiovascular: Normal rate, regular rhythm, normal heart sounds and intact distal pulses.    Pulmonary/Chest: Effort normal and breath sounds normal.   Abdominal: Soft. Bowel sounds are normal.   Neurological:   E4 V1t M4  Awake, attends  PERRLA  Does not follow commands,  Cough/gag intact  LORD, withdraws to pain all extremites   Skin: Skin is warm and dry. Capillary refill takes 2 to 3 seconds.   Vitals reviewed.        Medications:  Continuous  sodium chloride 0.9% Last Rate: 75 mL/hr at 07/24/17 1400   Scheduled  amlodipine 5 mg Daily   chlorhexidine 15 mL BID   famotidine 20 mg BID   fentaNYL     heparin (porcine) 5,000 Units Q12H   lactulose 10 g TID   levetiracetam in NaCl (iso-os) 500 mg Q12H   levothyroxine 25 mcg Before breakfast   piperacillin-tazobactam 4.5 g in dextrose 5 % 100 mL IVPB (ready to mix system) 4.5 g Q8H   sodium chloride 0.9% 10 mL Q6H   sodium chloride 0.9% 3 mL Q8H   sodium chloride 1 g TID   vancomycin (VANCOCIN) IVPB 750 mg Q12H   PRN  fentaNYL 50 mcg  Q2H PRN   hydrALAZINE 10 mg Q4H PRN   labetalol 10 mg Q6H PRN   magnesium oxide 800 mg PRN   magnesium oxide 800 mg PRN   potassium chloride 10% 40 mEq PRN   potassium chloride 10% 40 mEq PRN   potassium chloride 10% 60 mEq PRN   potassium, sodium phosphates 2 packet PRN   potassium, sodium phosphates 2 packet PRN   potassium, sodium phosphates 2 packet PRN   sodium chloride 0.9% 10 mL PRN     Today I personally reviewed pertinent medications, lines/drains/airways, imaging, cardiology, lab results, microbiology results,

## 2017-07-24 NOTE — PROGRESS NOTES
Pt extubated per MD order. Pt tolerated extubation well and is on a venti mask at 6L, 35%. Pt is maintaining an SpO2 of 100%. Will continue to monitor.

## 2017-07-24 NOTE — PROGRESS NOTES
Pt extubated by RT per Dr. Gudino order at 1630.  RNx2 at bedside.  Pt tolerated well. On O2 Mask at 6L/35%. Sats 100.  Will continue to monitor.

## 2017-07-24 NOTE — PROGRESS NOTES
[facility]  Adult Nutrition  Progress Note    SUMMARY     Recommendations    Recommendation/Intervention:  Recommend continuing Isosource 1.5 @ goal rate 40mL/hr.  - Hold for residuals >500mL.     RD to monitor.      Goals: Pt to receive nutrition by RD follow up   Nutrition Goal Status: goal met  Communication of RD Recs: reviewed with RN    Reason for Assessment    Reason for Assessment: RD follow-up  Diagnosis: hemorrhage  Relevent Medical History: stroke, CAD, depression, GERD, HTN, thyroid disease         General Information Comments: Pt remains intubated. TF started and increasing to goal rate without residuals.    Nutrition Discharge Planning: unable to determine at this time    Nutrition Prescription Ordered    Current Diet Order: NPO  Nutrition Order Comments: TF running at 20mL/hr  Current Nutrition Support Formula Ordered: Isosource 1.5  Current Nutrition Support Rate Ordered: 40 (ml)  Current Nutrition Support Frequency Ordered: mL/hr        Evaluation of Received Nutrients/Fluid Intake    Enteral Calories (kcal): 1440  Enteral Protein (gm): 65  Enteral (Free Water) Fluid (mL): 733      Energy Calories Required: meeting needs  % Kcal Needs: 113      Protein Required: meeting needs  % Protein Needs: 100     IV Fluid (mL): 1800         Fluid Required: meeting needs  Comments: 2mL residuals 7/24  Tolerance: tolerating  % Intake of Estimated Energy Needs: 75 - 100 %  % Meal Intake: NPO     Nutrition Risk Screen     Nutrition Risk Screen: no indicators present    Nutrition/Diet History       Typical Food/Fluid Intake: KATHY. [facility]  Food Preferences: KATHY Yazidism/cultural preferences at this time.        Factors Affecting Nutritional Intake: NPO, on mechanical ventilation                Labs/Tests/Procedures/Meds       Pertinent Labs Reviewed: reviewed  Pertinent Labs Comments: K 3.3, POCT Glu 102-112  Pertinent Medications Reviewed: reviewed  Pertinent Medications Comments:  "lactulose, IVF    Physical Findings    Overall Physical Appearance: nourished, on ventilator support  Tubes: orogastric tube     Skin: intact    Anthropometrics    Temp: 97.8 °F (36.6 °C)     Height: 5' 4" (162.6 cm)  Weight Method: Bed Scale  Weight: 55.7 kg (122 lb 12.7 oz)     Ideal Body Weight (IBW), Female: 120 lb     % Ideal Body Weight, Female (lb): 99.21 lb  BMI (Calculated): 20.5  BMI Grade: 18.5-24.9 - normal    Estimated/Assessed Needs    Weight Used For Calorie Calculations: 54 kg (119 lb 0.8 oz)      Energy Calorie Requirements (kcal): 1272  Energy Need Method: [facility]        RMR ([place]-St. Jeor Equation): 1120        Weight Used For Protein Calculations: 54 kg (119 lb 0.8 oz)  Protein Requirements: 65-81g (1.2-1.5g/kg)  Fluid Requirements (mL): 1ml/kcal or per MD  RDA Method (mL): 1272               Assessment and Plan       Nutrition Problem  Inadequate energy intake     Related to (etiology):   Inability to consume sufficient energy     Signs and Symptoms (as evidenced by):   NPO, no alternative means of nutrition.      Interventions/Recommendations (treatment strategy):  See RD recs above.     Nutrition Diagnosis Status:   Improving    Monitor and Evaluation    Food and Nutrient Intake: energy intake, food and beverage intake, enteral nutrition intake  Food and Nutrient Adminstration: diet order, enteral and parenteral nutrition administration        Anthropometric Measurements: weight, weight change, body mass index  Biochemical Data, Medical Tests and Procedures: electrolyte and renal panel, gastrointestinal profile, glucose/endocrine profile, inflammatory profile, lipid profile  Nutrition-Focused Physical Findings: overall appearance    Nutrition Risk    Level of Risk: other (see comments) (f/u 1x/week)    Nutrition Follow-Up    RD Follow-up?: Yes    "

## 2017-07-24 NOTE — ASSESSMENT & PLAN NOTE
-- Leukocytosis  -- Started empirical abx treatment with Vancomycin /Zosyn for 7 days on 7/19,   -- Pan culture pending - sent repeat resp culture yesterday  -- continue broad spec abx while cx's pending  -Plan for 7 day course

## 2017-07-25 LAB
ALBUMIN SERPL BCP-MCNC: 2.5 G/DL
ALP SERPL-CCNC: 356 U/L
ALT SERPL W/O P-5'-P-CCNC: 70 U/L
AMMONIA PLAS-SCNC: 35 UMOL/L
AMYLASE SERPL-CCNC: 47 U/L
ANION GAP SERPL CALC-SCNC: 10 MMOL/L
AST SERPL-CCNC: 43 U/L
BASOPHILS # BLD AUTO: 0.09 K/UL
BASOPHILS NFR BLD: 0.7 %
BILIRUB SERPL-MCNC: 4.8 MG/DL
BUN SERPL-MCNC: 5 MG/DL
CALCIUM SERPL-MCNC: 9.2 MG/DL
CHLORIDE SERPL-SCNC: 105 MMOL/L
CO2 SERPL-SCNC: 23 MMOL/L
CREAT SERPL-MCNC: 0.8 MG/DL
DIFFERENTIAL METHOD: ABNORMAL
EOSINOPHIL # BLD AUTO: 0.5 K/UL
EOSINOPHIL NFR BLD: 4.1 %
ERYTHROCYTE [DISTWIDTH] IN BLOOD BY AUTOMATED COUNT: 17 %
EST. GFR  (AFRICAN AMERICAN): >60 ML/MIN/1.73 M^2
EST. GFR  (NON AFRICAN AMERICAN): >60 ML/MIN/1.73 M^2
GLUCOSE SERPL-MCNC: 177 MG/DL
HCT VFR BLD AUTO: 38.2 %
HGB BLD-MCNC: 12.7 G/DL
INR PPP: 1
LIPASE SERPL-CCNC: 60 U/L
LYMPHOCYTES # BLD AUTO: 3.1 K/UL
LYMPHOCYTES NFR BLD: 25.7 %
MAGNESIUM SERPL-MCNC: 1.8 MG/DL
MAGNESIUM SERPL-MCNC: 2.2 MG/DL
MCH RBC QN AUTO: 30.8 PG
MCHC RBC AUTO-ENTMCNC: 33.2 G/DL
MCV RBC AUTO: 93 FL
MONOCYTES # BLD AUTO: 1.5 K/UL
MONOCYTES NFR BLD: 12.2 %
NEUTROPHILS # BLD AUTO: 6.8 K/UL
NEUTROPHILS NFR BLD: 56.1 %
PHOSPHATE SERPL-MCNC: 3.6 MG/DL
PLATELET # BLD AUTO: 160 K/UL
PMV BLD AUTO: 9.1 FL
POCT GLUCOSE: 89 MG/DL (ref 70–110)
POCT GLUCOSE: 94 MG/DL (ref 70–110)
POCT GLUCOSE: 99 MG/DL (ref 70–110)
POTASSIUM SERPL-SCNC: 3.8 MMOL/L
POTASSIUM SERPL-SCNC: 4.9 MMOL/L
PROT SERPL-MCNC: 6.8 G/DL
PROTHROMBIN TIME: 11 SEC
RBC # BLD AUTO: 4.12 M/UL
SODIUM SERPL-SCNC: 120 MMOL/L
SODIUM SERPL-SCNC: 138 MMOL/L
SODIUM SERPL-SCNC: 139 MMOL/L
WBC # BLD AUTO: 12.19 K/UL

## 2017-07-25 PROCEDURE — 95813 EEG EXTND MNTR 61-119 MIN: CPT | Mod: 26,,, | Performed by: PSYCHIATRY & NEUROLOGY

## 2017-07-25 PROCEDURE — 83690 ASSAY OF LIPASE: CPT

## 2017-07-25 PROCEDURE — 27000221 HC OXYGEN, UP TO 24 HOURS

## 2017-07-25 PROCEDURE — 85610 PROTHROMBIN TIME: CPT

## 2017-07-25 PROCEDURE — 63600175 PHARM REV CODE 636 W HCPCS: Performed by: NURSE PRACTITIONER

## 2017-07-25 PROCEDURE — 25000003 PHARM REV CODE 250: Performed by: NURSE PRACTITIONER

## 2017-07-25 PROCEDURE — 82140 ASSAY OF AMMONIA: CPT

## 2017-07-25 PROCEDURE — 95813 EEG EXTND MNTR 61-119 MIN: CPT

## 2017-07-25 PROCEDURE — 97530 THERAPEUTIC ACTIVITIES: CPT

## 2017-07-25 PROCEDURE — 20600001 HC STEP DOWN PRIVATE ROOM

## 2017-07-25 PROCEDURE — 99233 SBSQ HOSP IP/OBS HIGH 50: CPT | Mod: ,,, | Performed by: PSYCHIATRY & NEUROLOGY

## 2017-07-25 PROCEDURE — A4216 STERILE WATER/SALINE, 10 ML: HCPCS | Performed by: PHYSICIAN ASSISTANT

## 2017-07-25 PROCEDURE — 25000003 PHARM REV CODE 250: Performed by: PSYCHIATRY & NEUROLOGY

## 2017-07-25 PROCEDURE — 80053 COMPREHEN METABOLIC PANEL: CPT

## 2017-07-25 PROCEDURE — 82150 ASSAY OF AMYLASE: CPT

## 2017-07-25 PROCEDURE — 99233 SBSQ HOSP IP/OBS HIGH 50: CPT | Mod: ,,, | Performed by: NURSE PRACTITIONER

## 2017-07-25 PROCEDURE — 84132 ASSAY OF SERUM POTASSIUM: CPT

## 2017-07-25 PROCEDURE — 85025 COMPLETE CBC W/AUTO DIFF WBC: CPT

## 2017-07-25 PROCEDURE — 83735 ASSAY OF MAGNESIUM: CPT | Mod: 91

## 2017-07-25 PROCEDURE — A4216 STERILE WATER/SALINE, 10 ML: HCPCS | Performed by: NURSE PRACTITIONER

## 2017-07-25 PROCEDURE — 83735 ASSAY OF MAGNESIUM: CPT

## 2017-07-25 PROCEDURE — 25000003 PHARM REV CODE 250: Performed by: PHYSICIAN ASSISTANT

## 2017-07-25 PROCEDURE — 84295 ASSAY OF SERUM SODIUM: CPT

## 2017-07-25 PROCEDURE — 84100 ASSAY OF PHOSPHORUS: CPT

## 2017-07-25 RX ORDER — LABETALOL HYDROCHLORIDE 5 MG/ML
10 INJECTION, SOLUTION INTRAVENOUS EVERY 6 HOURS PRN
Status: DISCONTINUED | OUTPATIENT
Start: 2017-07-25 | End: 2017-07-31 | Stop reason: HOSPADM

## 2017-07-25 RX ORDER — FAMOTIDINE 20 MG/1
20 TABLET, FILM COATED ORAL 2 TIMES DAILY
Status: DISCONTINUED | OUTPATIENT
Start: 2017-07-25 | End: 2017-07-28

## 2017-07-25 RX ADMIN — MAGNESIUM OXIDE TAB 400 MG (241.3 MG ELEMENTAL MG) 800 MG: 400 (241.3 MG) TAB at 05:07

## 2017-07-25 RX ADMIN — POTASSIUM CHLORIDE 40 MEQ: 20 SOLUTION ORAL at 05:07

## 2017-07-25 RX ADMIN — LACTULOSE 10 G: 20 SOLUTION ORAL at 01:07

## 2017-07-25 RX ADMIN — Medication 3 ML: at 05:07

## 2017-07-25 RX ADMIN — LACTULOSE 10 G: 20 SOLUTION ORAL at 05:07

## 2017-07-25 RX ADMIN — CHLORHEXIDINE GLUCONATE 15 ML: 1.2 RINSE ORAL at 08:07

## 2017-07-25 RX ADMIN — AMLODIPINE BESYLATE 10 MG: 10 TABLET ORAL at 08:07

## 2017-07-25 RX ADMIN — FAMOTIDINE 20 MG: 20 TABLET, FILM COATED ORAL at 08:07

## 2017-07-25 RX ADMIN — FENTANYL CITRATE 50 MCG: 50 INJECTION INTRAMUSCULAR; INTRAVENOUS at 04:07

## 2017-07-25 RX ADMIN — MAGNESIUM OXIDE TAB 400 MG (241.3 MG ELEMENTAL MG) 800 MG: 400 (241.3 MG) TAB at 09:07

## 2017-07-25 RX ADMIN — VANCOMYCIN HYDROCHLORIDE 750 MG: 750 INJECTION, POWDER, LYOPHILIZED, FOR SOLUTION INTRAVENOUS at 06:07

## 2017-07-25 RX ADMIN — LABETALOL HYDROCHLORIDE 10 MG: 5 INJECTION, SOLUTION INTRAVENOUS at 04:07

## 2017-07-25 RX ADMIN — SODIUM CHLORIDE TAB 1 GM 1 G: 1 TAB at 05:07

## 2017-07-25 RX ADMIN — PIPERACILLIN AND TAZOBACTAM 4.5 G: 4; .5 INJECTION, POWDER, LYOPHILIZED, FOR SOLUTION INTRAVENOUS; PARENTERAL at 08:07

## 2017-07-25 RX ADMIN — LEVETIRACETAM 500 MG: 5 INJECTION INTRAVENOUS at 11:07

## 2017-07-25 RX ADMIN — SODIUM CHLORIDE: 0.9 INJECTION, SOLUTION INTRAVENOUS at 05:07

## 2017-07-25 RX ADMIN — FENTANYL CITRATE 50 MCG: 50 INJECTION INTRAMUSCULAR; INTRAVENOUS at 05:07

## 2017-07-25 RX ADMIN — HEPARIN SODIUM 5000 UNITS: 5000 INJECTION, SOLUTION INTRAVENOUS; SUBCUTANEOUS at 11:07

## 2017-07-25 RX ADMIN — Medication 3 ML: at 11:07

## 2017-07-25 RX ADMIN — PIPERACILLIN AND TAZOBACTAM 4.5 G: 4; .5 INJECTION, POWDER, LYOPHILIZED, FOR SOLUTION INTRAVENOUS; PARENTERAL at 04:07

## 2017-07-25 RX ADMIN — LABETALOL HYDROCHLORIDE 10 MG: 5 INJECTION, SOLUTION INTRAVENOUS at 08:07

## 2017-07-25 RX ADMIN — HEPARIN SODIUM 5000 UNITS: 5000 INJECTION, SOLUTION INTRAVENOUS; SUBCUTANEOUS at 08:07

## 2017-07-25 RX ADMIN — LEVETIRACETAM 500 MG: 5 INJECTION INTRAVENOUS at 09:07

## 2017-07-25 RX ADMIN — VANCOMYCIN HYDROCHLORIDE 750 MG: 750 INJECTION, POWDER, LYOPHILIZED, FOR SOLUTION INTRAVENOUS at 08:07

## 2017-07-25 RX ADMIN — LEVOTHYROXINE SODIUM 25 MCG: 25 TABLET ORAL at 05:07

## 2017-07-25 RX ADMIN — Medication 10 ML: at 11:07

## 2017-07-25 RX ADMIN — SODIUM CHLORIDE TAB 1 GM 1 G: 1 TAB at 01:07

## 2017-07-25 RX ADMIN — Medication 10 ML: at 05:07

## 2017-07-25 RX ADMIN — HYDRALAZINE HYDROCHLORIDE 10 MG: 20 INJECTION INTRAMUSCULAR; INTRAVENOUS at 02:07

## 2017-07-25 NOTE — ASSESSMENT & PLAN NOTE
--Continue Neuro checks q 1hr  -- Neurosurgery consulted, signed off. Plan: Follow-up with PA in neurosurgery clinic in 2 weeks with repeated head CT   -- Stroke Service consulted   -- CT Head 7/19 reveals there is a 4.2 cm hyperdense mass seen in the medial right temporal lobe with associated mass effect resulting in sulcal effacement of adjacent sulci.  -- repeat head CT on 7/20 stable  -- SBP goal < 160  -- Na+ goal 140  -- continue Keppra  -- PT/OT/Speech  -- EEG to evaluate Aphasia, no report in chart from previous

## 2017-07-25 NOTE — ASSESSMENT & PLAN NOTE
-- ERCP previously aborted  -- ammonia trending down  --Bili improving/stablized  -- LFTs stable  --Gastrology note (7/20)  states to eval ERCP later day,  as bili is improving with stent.

## 2017-07-25 NOTE — PROGRESS NOTES
[facility]  Neurocritical Care  Progress Note/Transfer Note    Admit Date: 7/19/2017  Service Date: 07/25/2017  Length of Stay: 5    Subjective:     Chief Complaint: Nontraumatic intracerebral hemorrhage in hemisphere, unspecified    History of Present Illness: Ms. Malina Robles is 57 year old female with a PMHx of prior stroke,  Asthma, coronary artery disease (S/P Cardiac Surgery), Depression, Emphysema, GERD, Hypertension, thyroid disease, with recent Biliary Stent 7/18,  who presents to Neuro Critical Care as a transfer from [facility]/ Right Temporal ICH. Per the chart,  she was admitted to [facility] from [facility] on 7/12/17 with altered mental status and increase jaundice upon admission PT-INR 4.9, , , Ammonia <9 on 7/12. She was on Plavix prior to admit for coronary artery disease which was upon admission.CT of abdomen on 7/13/17 showed 2cm stone distal common duct resulting in moderate biliary ductal dilatation and 3cm infrarenal abdominal aortic aneurysm. On 7/14 Urine culture positive for gram negative rods. She is being treated with IV Ceftriaxone.  7/15 liver were enzymes worsening.   On 7/18/17 GI attempted  ERCP, but could not get stone out. Was able to place stent though with only old bile noted behind the stone. he was started on nicardipine gtt for goal  to 140. Her GCS was 9 to 10, and she was intubated for airway protection prior to arrival at [facility]. She lives at [facility]. No family at bedside. Patient will be admitted to Neuro Critical Care for a higher level of care.            Hospital Course: 7/19 admitted to Neuro Critical Care as a transfer from [facility] Right Temporal ICH 4.2 cm ; recent Biliary Stent 7/18 at outside hospital  Cultured today, consulted GI for repeat of ERCP; started abx, pending CTA  7/20: No issues. Bradycardic with Precedex.  Pending EEG report. CTA with no  evidence of AVM. Gi consult. Repeat ERCP.   7/21: Pending ERCP. Pending. More awake and interactive. Following commands.  Pending PICC line placement. Na 138. Getting HTS 2% 300 cc PRN.  7/22: awake, interactive, poorly following commands. PICC in place. Na 142. Discontinue hypertonic. Send ammonia  7/23: ammonia trending down; not following commands; get EEG  7/24 Awaiting EEG, SBP parameters increased to <160, extubated  7/25 EEG today to eval aphasia, plan to transfer to Vascular Neurology    Interval History:    -extubated, tolerating well  -EEG today to eval aphasia  -Transfer to vascular Neurology pending eeg    Review of Systems   Unable to perform ROS: Patient nonverbal       Objective:     Vitals:  Temp: 97.5 °F (36.4 °C) (07/25/17 1101)  Pulse: 65 (07/25/17 1200)  Resp: (!) 22 (07/25/17 1200)  BP: 122/64 (07/25/17 1200)  SpO2: 99 % (07/25/17 1200)    Temp:  [97.1 °F (36.2 °C)-99.2 °F (37.3 °C)] 97.5 °F (36.4 °C)  Pulse:  [61-89] 65  Resp:  [13-31] 22  SpO2:  [97 %-100 %] 99 %  BP: (117-178)/() 122/64  Arterial Line BP: (100-169)/() 151/98         Vent Mode: Spont  Oxygen Concentration (%):  [35-40] 35  Resp Rate Total:  [13 br/min-24 br/min] 17 br/min  Vt Set:  [400 mL] 400 mL  PEEP/CPAP:  [5 cmH20] 5 cmH20  Pressure Support:  [8 cmH20] 8 cmH20  Mean Airway Pressure:  [6.7 cmH20-7.1 cmH20] 7.1 cmH20    07/24 0701 - 07/25 0700  In: 3143.8 [I.V.:1573.8]  Out: 4640 [Urine:4440]    Physical Exam   Constitutional: She appears well-developed and well-nourished.   Cardiovascular: Normal rate, regular rhythm, normal heart sounds and intact distal pulses.    Pulmonary/Chest: Effort normal and breath sounds normal.   Abdominal: Soft. Bowel sounds are normal.   Neurological:   E4 V1t M4  Awake, attends  PERRLA  Does not follow commands,  Cough/gag intact  LORD, withdraws to pain all extremites   Skin: Skin is warm and dry. Capillary refill takes 2 to 3 seconds.   Vitals reviewed.   Skin: Skin is warm and  dry.         Medications:  Continuous  sodium chloride 0.9% Last Rate: 75 mL/hr at 07/25/17 1200   Scheduled  amlodipine 10 mg Daily   heparin (porcine) 5,000 Units Q12H   lactulose 10 g TID   levetiracetam in NaCl (iso-os) 500 mg Q12H   levothyroxine 25 mcg Before breakfast   piperacillin-tazobactam 4.5 g in dextrose 5 % 100 mL IVPB (ready to mix system) 4.5 g Q8H   sodium chloride 0.9% 10 mL Q6H   sodium chloride 0.9% 3 mL Q8H   sodium chloride 1 g TID   vancomycin (VANCOCIN) IVPB 750 mg Q12H   PRN  fentaNYL 50 mcg Q2H PRN   hydrALAZINE 10 mg Q4H PRN   labetalol 10 mg Q6H PRN   magnesium oxide 800 mg PRN   magnesium oxide 800 mg PRN   potassium chloride 10% 40 mEq PRN   potassium chloride 10% 40 mEq PRN   potassium chloride 10% 60 mEq PRN   potassium, sodium phosphates 2 packet PRN   potassium, sodium phosphates 2 packet PRN   potassium, sodium phosphates 2 packet PRN   sodium chloride 0.9% 10 mL PRN     Today I personally reviewed pertinent medications, lines/drains/airways, imaging, cardiology, lab results, microbiology results,         Assessment/Plan:     Neuro   * Nontraumatic intracerebral hemorrhage in hemisphere, unspecified    --Continue Neuro checks q 1hr  -- Neurosurgery consulted, signed off. Plan: Follow-up with PA in neurosurgery clinic in 2 weeks with repeated head CT   -- Stroke Service consulted   -- CT Head 7/19 reveals there is a 4.2 cm hyperdense mass seen in the medial right temporal lobe with associated mass effect resulting in sulcal effacement of adjacent sulci.  -- repeat head CT on 7/20 stable  -- SBP goal < 160  -- Na+ goal 140  -- continue Keppra  -- PT/OT/Speech  -- EEG to evaluate Aphasia, no report in chart from previous                Hepatic coma/encephalopathy    -- ERCP previously aborted  -- ammonia trending down  --Bili improving/stablized  -- LFTs stable  --Gastrology note (7/20)  states to eval ERCP later day,  as bili is improving with stent.         Cardiac   Essential  hypertension    -- SBP goal less 160  -- Norvasc increased to 10  -EF 60%        Hem/Onc   Leukocytosis    -- Leukocytosis  -- Started empirical abx treatment with Vancomycin /Zosyn for 7 days on 7/19,   -- Pan culture pending  -- continue broad spec abx while cx's pending  -Plan for 7 day course, should finish 7/25        Endocrine   Hypothyroid    -- TSH 5.2  -- Continue Synthroid  -- Endocrinology following        Fluids/Electrolytes/Nutrition/GI   Calculus of bile duct without mention of cholecystitis, with obstruction    --ERCP unsuccessful stone removal  --continue Lactulose   --trend ammonia  --TF at goal, liver enzymes stable. Bili decreased            Prophylaxis:  Venous Thromboembolism: mechanical chemical  Stress Ulcer: None  Ventilator Pneumonia: not applicable     Activity Orders          None        Full Code   Level 3  I spent >35 minutes reviewing patient records, examining, and counseling the patient with greater than 50% of the time spent with direct patient care and coordination.       Fran Pan NP  Neurocritical Care  [facility]

## 2017-07-25 NOTE — SUBJECTIVE & OBJECTIVE
Interval History:    -extubated, tolerating well  -EEG today to eval aphasia  -Transfer to vascular Neurology pending eeg    Review of Systems   Unable to perform ROS: Patient nonverbal       Objective:     Vitals:  Temp: 97.5 °F (36.4 °C) (07/25/17 1101)  Pulse: 65 (07/25/17 1200)  Resp: (!) 22 (07/25/17 1200)  BP: 122/64 (07/25/17 1200)  SpO2: 99 % (07/25/17 1200)    Temp:  [97.1 °F (36.2 °C)-99.2 °F (37.3 °C)] 97.5 °F (36.4 °C)  Pulse:  [61-89] 65  Resp:  [13-31] 22  SpO2:  [97 %-100 %] 99 %  BP: (117-178)/() 122/64  Arterial Line BP: (100-169)/() 151/98         Vent Mode: Spont  Oxygen Concentration (%):  [35-40] 35  Resp Rate Total:  [13 br/min-24 br/min] 17 br/min  Vt Set:  [400 mL] 400 mL  PEEP/CPAP:  [5 cmH20] 5 cmH20  Pressure Support:  [8 cmH20] 8 cmH20  Mean Airway Pressure:  [6.7 cmH20-7.1 cmH20] 7.1 cmH20    07/24 0701 - 07/25 0700  In: 3143.8 [I.V.:1573.8]  Out: 4640 [Urine:4440]    Physical Exam   Constitutional: She appears well-developed and well-nourished.   Cardiovascular: Normal rate, regular rhythm, normal heart sounds and intact distal pulses.    Pulmonary/Chest: Effort normal and breath sounds normal.   Abdominal: Soft. Bowel sounds are normal.   Neurological:   E4 V1t M4  Awake, attends  PERRLA  Does not follow commands,  Cough/gag intact  LORD, withdraws to pain all extremites   Skin: Skin is warm and dry. Capillary refill takes 2 to 3 seconds.   Vitals reviewed.   Skin: Skin is warm and dry.         Medications:  Continuous  sodium chloride 0.9% Last Rate: 75 mL/hr at 07/25/17 1200   Scheduled  amlodipine 10 mg Daily   heparin (porcine) 5,000 Units Q12H   lactulose 10 g TID   levetiracetam in NaCl (iso-os) 500 mg Q12H   levothyroxine 25 mcg Before breakfast   piperacillin-tazobactam 4.5 g in dextrose 5 % 100 mL IVPB (ready to mix system) 4.5 g Q8H   sodium chloride 0.9% 10 mL Q6H   sodium chloride 0.9% 3 mL Q8H   sodium chloride 1 g TID   vancomycin (VANCOCIN) IVPB 750 mg Q12H    PRN  fentaNYL 50 mcg Q2H PRN   hydrALAZINE 10 mg Q4H PRN   labetalol 10 mg Q6H PRN   magnesium oxide 800 mg PRN   magnesium oxide 800 mg PRN   potassium chloride 10% 40 mEq PRN   potassium chloride 10% 40 mEq PRN   potassium chloride 10% 60 mEq PRN   potassium, sodium phosphates 2 packet PRN   potassium, sodium phosphates 2 packet PRN   potassium, sodium phosphates 2 packet PRN   sodium chloride 0.9% 10 mL PRN     Today I personally reviewed pertinent medications, lines/drains/airways, imaging, cardiology, lab results, microbiology results,

## 2017-07-25 NOTE — ASSESSMENT & PLAN NOTE
--ERCP unsuccessful stone removal  --continue Lactulose   --trend ammonia  --TF at goal, liver enzymes stable. Bili decreased

## 2017-07-25 NOTE — ASSESSMENT & PLAN NOTE
Patient with history of hep C and with current bile duct stone, had stent placed at OSH  GI following  AST 43 ALT 70  t bili 4.8  Will need repeat ERCP

## 2017-07-25 NOTE — PT/OT/SLP PROGRESS
"Physical Therapy  Treatment/POC Updated    Malina Robles   MRN: [id number]   Admitting Diagnosis: Nontraumatic intracerebral hemorrhage in hemisphere, unspecified    PT Received On: 07/25/17  PT Start Time: 1405     PT Stop Time: 1429    PT Total Time (min): 24 min       Billable Minutes:  Therapeutic Activity 24    Treatment Type: Treatment; pt extubated 7/24 in PM.   PT/PTA: PT       General Precautions: Standard, aspiration, fall, NPO, seizure    Subjective:  Communicated with RN (Caitlin) and NCC NP (Maxim) prior to session. Pt cleared for EOB activity only with minimal movement 2/2 EEG placement.    "Malina" pt states "Yes." pt minimally verbal throughout.    Pain/Comfort  Pain Rating 1: 0/10  Pain Rating Post-Intervention 1: 0/10 (NAD; resting quietly)    Objective:   Patient found with: blood pressure cuff, NG tube, telemetry, restraints, pulse ox (continuous), peripheral IV, bowel management system, jenkins catheter, SCD, pressure relief boots, EEG monitoring    Functional Mobility:  Bed Mobility:   Rolling/Turning to Left: Moderate assistance  Rolling/Turning Right: Maximum assistance  Scooting/Bridging: Maximum Assistance  Supine to Sit: Maximum Assistance  Sit to Supine: Maximum Assistance    Transfers:  Sit <> Stand Assistance: Activity did not occur ( not appropriate 2/2 EEG apparatus)    Balance:   Static Sit: POOR+: Needs MINIMAL assist to maintain  Dynamic Sit: FAIR: Cannot move trunk without losing balance  Static Stand: KATHY    Therapeutic Activities and Exercises:  PT arrived to pt's room to find pt resting quietly; agreeable to PT session. Pt performed mobility as above. Pt tolerated sitting EOB x8 mn this date; pt unable to follow commands or meaningfully participate in mobility tasks at this time. Pt req mod A for sitting EOB without foot support, able to visually attend to auditory stimuli. Upon return to supine, pt positioned in neutral at HOB. RN updated on pt's performance and mobility needs. " VSS throughout treatment. Questions/concerns addressed within PT scope of practice.      AM-PAC 6 CLICK MOBILITY  How much help from another person does this patient currently need?   1 = Unable, Total/Dependent Assistance  2 = A lot, Maximum/Moderate Assistance  3 = A little, Minimum/Contact Guard/Supervision  4 = None, Modified [place]/Independent    Turning over in bed (including adjusting bedclothes, sheets and blankets)?: 2  Sitting down on and standing up from a chair with arms (e.g., wheelchair, bedside commode, etc.): 2  Moving from lying on back to sitting on the side of the bed?: 2  Moving to and from a bed to a chair (including a wheelchair)?: 2  Need to walk in hospital room?: 1  Climbing 3-5 steps with a railing?: 1  Total Score: 10    AM-PAC Raw Score CMS G-Code Modifier Level of Impairment Assistance   6 % Total / Unable   7 - 9 CM 80 - 100% Maximal Assist   10 - 14 CL 60 - 80% Moderate Assist   15 - 19 CK 40 - 60% Moderate Assist   20 - 22 CJ 20 - 40% Minimal Assist   23 CI 1-20% SBA / CGA   24 CH 0% Independent/ Mod I     Patient left HOB elevated with all lines intact, call button in reach, bed alarm on, RN notified.    Assessment:  Malina Robles is a 57 y.o. female with a medical diagnosis of Nontraumatic intracerebral hemorrhage in hemisphere, unspecified and presents with poor participation with PT services 2/2 impaired commands following. Pt unable to participate meaningfully this date; however, with signs of improved mobility initiation and verbalization this date. Pt tolerated EOB sitting with VSS. POC changed to 4x/wk to meet pt's current rehabilitative needs. Will progress as tolerated. Patient will benefit from continued PT services to address:    Rehab identified problem list/impairments: Rehab identified problem list/impairments: weakness, impaired endurance, impaired sensation, impaired balance, impaired self care skills, visual deficits, impaired functional mobilty,  gait instability, impaired cognition, decreased lower extremity function, decreased upper extremity function, decreased safety awareness, abnormal tone, decreased coordination, decreased ROM, impaired fine motor, impaired muscle length, impaired joint extensibility, impaired skin    Rehab potential is good.    Activity tolerance: Good    Discharge recommendations: Discharge Facility/Level Of Care Needs:  (return to NH)     Barriers to discharge: Barriers to Discharge: None    Equipment recommendations: Equipment Needed After Discharge: none     GOALS:    Physical Therapy Goals        Problem: Physical Therapy Goal    Goal Priority Disciplines Outcome Goal Variances Interventions   Physical Therapy Goal     PT/OT, PT Ongoing (interventions implemented as appropriate)     Description:  Goals to be met by: 8/3/2017     Patient will increase functional independence with mobility by performin. Pt will participate in bilateral UE and LE ROM exercises on 3 consecutive visits with no change in vitals.   2. Pt will perform rolling R and L with moderate assistance   3. Supine to sit with moderate assistance   4. Sit to supine with moderate assistance   5.  Pt will tolerate sitting EOB x 10 minutes with moderate assistance                      PLAN:    Patient to be seen 4 x/week  to address the above listed problems via gait training, therapeutic activities, therapeutic exercises, neuromuscular re-education  Plan of Care expires: 17  Plan of Care reviewed with: patient     Magali AYDE Sy, PT, DPT  [phone]  2017

## 2017-07-25 NOTE — PROGRESS NOTES
[facility]  Vascular Neurology  Comprehensive Stroke Center  Progress Note    Assessment/Plan:     7/19: AMS this AM. CT head shows R temporal ICH with IVH. Transferred to [facility] ED and admitted to [facility]. Of note, patient's initial INR was 4.9.     07/20: remains intubated and on cardene, patient not sedated. See by GI for bile duct stone and plans to repeat ERCP. Patient with leukocytosis-cultures pending, on vanc/zosyn.   7/21/17 Remains intubated. CTA without abnormality to explain ICH.   07/24/17 intubated, neurologically stable, more alert but not following commands, likely with eyelid opening apraxia, remains encephalopathic but unclear whether it's related to hepatic impairment or ICH  7/25/17 patient extubated yesterday, alert but not following commands and nonverbal-EEG pending but aphasia likely due to past L MCA    * Nontraumatic intracerebral hemorrhage in hemisphere, unspecified    57 y.o. female PMH hepatitis C with transaminitis, CAD, thyroid disease, and h/o CVA on ASA, plavix. CT with R temporal ICH. CTA without evidence of etiology and ECHO EF 60, normal atrial size.      Antiplatelets: start ASA  Statins: None - hemorrhage - though consider statin as LDL is 342 but elevated LFTs  SBP < 140  Diagnostics: MRI brain with contrast in 1 month, Pending EEG  DVT ppx: TEDs, SCDs  PT/OT and speech to evaluate and treat  Neuro checks qh        Vasogenic cerebral edema    2/2 ICH  Evident on imaging        Essential hypertension    - Stroke risk factor.  - Management per primary team  - SBP<140 in setting of bleed        Elevated liver enzymes    AST/ALT improving   Management per primary team  INR 4.9 previously; now normal at 1.0        Jaundice    Patient with history of hep C and with current bile duct stone, had stent placed at OSH  GI following  AST 43 ALT 70  t bili 4.8  Will need repeat ERCP            Neurologic Chief Complaint: R temporal ICH    Subjective:     Interval History:  Patient is seen for follow-up neurological assessment and treatment recommendations: NAEON, alert but not following commands, extubated    HPI, Past Medical, Family, and Social History remains the same as documented in the initial encounter.     Review of Systems   Constitutional: Negative for fever.   Respiratory: Negative for cough.    Cardiovascular: Negative for leg swelling.   Gastrointestinal: Negative for vomiting.   Skin: Positive for color change.   Neurological: Positive for speech difficulty. Negative for weakness.   Psychiatric/Behavioral: Negative for agitation.     Scheduled Meds:   amlodipine  10 mg Per OG tube Daily    famotidine  20 mg Per NG tube BID    heparin (porcine)  5,000 Units Subcutaneous Q12H    lactulose  10 g Per NG tube TID    levetiracetam in NaCl (iso-os)  500 mg Intravenous Q12H    levothyroxine  25 mcg Per OG tube Before breakfast    piperacillin-tazobactam 4.5 g in dextrose 5 % 100 mL IVPB (ready to mix system)  4.5 g Intravenous Q8H    sodium chloride 0.9%  10 mL Intravenous Q6H    sodium chloride 0.9%  3 mL Intravenous Q8H    sodium chloride  1 g Per OG tube TID    vancomycin (VANCOCIN) IVPB  750 mg Intravenous Q12H     Continuous Infusions:   sodium chloride 0.9% 75 mL/hr at 07/25/17 1501     PRN Meds:fentaNYL, hydrALAZINE, labetalol, magnesium oxide, magnesium oxide, potassium chloride 10%, potassium chloride 10%, potassium chloride 10%, potassium, sodium phosphates, potassium, sodium phosphates, potassium, sodium phosphates, Flushing PICC Protocol **AND** sodium chloride 0.9% **AND** sodium chloride 0.9%    Objective:     Vital Signs (Most Recent):  Temp: 98.5 °F (36.9 °C) (07/25/17 1501)  Pulse: 78 (07/25/17 1600)  Resp: (!) 39 (07/25/17 1600)  BP: 131/80 (07/25/17 1501)  SpO2: 100 % (07/25/17 1600)  BP Location: Left arm    Vital Signs Range (Last 24H):  Temp:  [97.1 °F (36.2 °C)-98.5 °F (36.9 °C)]   Pulse:  [61-81]   Resp:  [13-39]   BP: (107-165)/(64-98)    SpO2:  [97 %-100 %]   Arterial Line BP: (100-169)/()   BP Location: Left arm    Physical Exam   Constitutional: She appears well-developed and well-nourished. No distress.   HENT:   Head: Normocephalic and atraumatic.   Eyes: EOM are normal. Pupils are equal, round, and reactive to light.   Scleral icterus   Cardiovascular: Normal rate.    Pulmonary/Chest: No respiratory distress.   intubated   Abdominal: Soft.   Neurological: She is alert.   does not follow commands   Skin: Skin is warm and dry.   Jaundice skin   Vitals reviewed.      Neurological Exam:   LOC: alert, not following commands  Language: aphasic  Speech: aphasic  Orientation: aphasic  Pupils (CN III, IV, VI): PERRL  Motor*: moves all extremities spontaneously, strength 3/5 in all 4 limbs; does not move to command.     NIH Stroke Scale:    Level of Consciousness: 0 - alert  LOC Questions: 2 - answers none correctly  LOC Commands: 2 - performs neither correctly  Best Gaze: 0 - normal  Visual: 0 - no visual loss  Facial Palsy: 0 - normal  Motor Left Arm: 1 - drift  Motor Right Arm: 1 - drift  Motor Left Le - drift  Motor Right Le - drift  Limb Ataxia: 0 - absent  Sensory: 0 - normal  Best Language: 3 - mute  Dysarthria: 2 - near to unintelligible  Extinction and Inattention: 0 - no neglect  NIH Stroke Scale Total: 13      Laboratory:  CMP:     Recent Labs  Lab 17  0202 17  0752 17  1518   CALCIUM 9.2  --   --    ALBUMIN 2.5*  --   --    PROT 6.8  --   --     139  --    K 3.8  --  4.9   CO2 23  --   --      --   --    BUN 5*  --   --    CREATININE 0.8  --   --    ALKPHOS 356*  --   --    ALT 70*  --   --    AST 43*  --   --    BILITOT 4.8*  --   --      CBC:     Recent Labs  Lab 17  0202   WBC 12.19   RBC 4.12   HGB 12.7   HCT 38.2      MCV 93   MCH 30.8   MCHC 33.2     Lipid Panel:     Recent Labs  Lab 17  1449   CHOL 420*   LDLCALC 342.0*   HDL 11*   TRIG 335*     Coagulation:   Recent  Labs  Lab 07/18/17  2212  07/25/17  0202   INR 1.0  < > 1.0   APTT 25.3  --   --    < > = values in this interval not displayed.  Platelet Aggregation Study: No results for input(s): PLTAGG, PLTAGINTERP, PLTAGREGLACO, ADPPLTAGGREG in the last 168 hours.  Hgb A1C:     Recent Labs  Lab 07/19/17  1449   HGBA1C 4.6     TSH:     Recent Labs  Lab 07/19/17  1449   TSH 3.018       Diagnostic Results:  I have personally reviewed:   Findings:     CT Head. Date: 07/19/17  There are CT findings of a large, acute intracranial hemorrhage involving the medial right temporal lobe with evidence of mass effect as described above.    CTA Head/Neck. Date: 07/19/17  Acute right temporal intraparenchymal hemorrhage grossly stable to the prior exam with persistent edema and localized mass effect. No significant hydrocephalus or midline shift.    Scattered atherosclerosis of the bilateral ICAs resulting in 50% stenosis of the right cavernous ICA. No large vessel occlusion or aneurysm identified.    Age advanced senescent changes.    Remote right basal ganglia and left cerebral and cerebellar infarcts, unchanged from examination performed earlier today.    CT Head. Date: 07/24/17  No interval detrimental change when compared to CT dated 07/20/2017.    Unchanged right temporal lobe parenchymal hemorrhage with stable surrounding vasogenic edema and local mass effect. No midline shift.  No new intra-or extra-axial hemorrhage.    Stable chronic findings as above.          Laura Dumas PA-C  Comprehensive Stroke Center  Department of Vascular Neurology   [facility]

## 2017-07-25 NOTE — ASSESSMENT & PLAN NOTE
57 y.o. female PMH hepatitis C with transaminitis, CAD, thyroid disease, and h/o CVA on ASA, plavix. CT with R temporal ICH. CTA without evidence of etiology and ECHO EF 60, normal atrial size.      Antiplatelets: start ASA  Statins: None - hemorrhage - though consider statin as LDL is 342 but elevated LFTs  SBP < 140  Diagnostics: MRI brain with contrast in 1 month, Pending EEG  DVT ppx: TEDs, SCDs  PT/OT and speech to evaluate and treat  Neuro checks qh

## 2017-07-25 NOTE — PLAN OF CARE
Problem: Patient Care Overview  Goal: Plan of Care Review  Outcome: Ongoing (interventions implemented as appropriate)  POC reviewed with pt at 0500. Pt nonverbal and unable to verbalize understanding. No acute events overnight. Pt progressing toward goals. Will continue to monitor. See flowsheets for full assessment and VS info

## 2017-07-25 NOTE — SUBJECTIVE & OBJECTIVE
Neurologic Chief Complaint: R temporal ICH    Subjective:     Interval History: Patient is seen for follow-up neurological assessment and treatment recommendations: NAEON, alert but not following commands, extubated    HPI, Past Medical, Family, and Social History remains the same as documented in the initial encounter.     Review of Systems   Constitutional: Negative for fever.   Respiratory: Negative for cough.    Cardiovascular: Negative for leg swelling.   Gastrointestinal: Negative for vomiting.   Skin: Positive for color change.   Neurological: Positive for speech difficulty. Negative for weakness.   Psychiatric/Behavioral: Negative for agitation.     Scheduled Meds:   amlodipine  10 mg Per OG tube Daily    famotidine  20 mg Per NG tube BID    heparin (porcine)  5,000 Units Subcutaneous Q12H    lactulose  10 g Per NG tube TID    levetiracetam in NaCl (iso-os)  500 mg Intravenous Q12H    levothyroxine  25 mcg Per OG tube Before breakfast    piperacillin-tazobactam 4.5 g in dextrose 5 % 100 mL IVPB (ready to mix system)  4.5 g Intravenous Q8H    sodium chloride 0.9%  10 mL Intravenous Q6H    sodium chloride 0.9%  3 mL Intravenous Q8H    sodium chloride  1 g Per OG tube TID    vancomycin (VANCOCIN) IVPB  750 mg Intravenous Q12H     Continuous Infusions:   sodium chloride 0.9% 75 mL/hr at 07/25/17 1501     PRN Meds:fentaNYL, hydrALAZINE, labetalol, magnesium oxide, magnesium oxide, potassium chloride 10%, potassium chloride 10%, potassium chloride 10%, potassium, sodium phosphates, potassium, sodium phosphates, potassium, sodium phosphates, Flushing PICC Protocol **AND** sodium chloride 0.9% **AND** sodium chloride 0.9%    Objective:     Vital Signs (Most Recent):  Temp: 98.5 °F (36.9 °C) (07/25/17 1501)  Pulse: 78 (07/25/17 1600)  Resp: (!) 39 (07/25/17 1600)  BP: 131/80 (07/25/17 1501)  SpO2: 100 % (07/25/17 1600)  BP Location: Left arm    Vital Signs Range (Last 24H):  Temp:  [97.1 °F (36.2 °C)-98.5  °F (36.9 °C)]   Pulse:  [61-81]   Resp:  [13-39]   BP: (107-165)/(64-98)   SpO2:  [97 %-100 %]   Arterial Line BP: (100-169)/()   BP Location: Left arm    Physical Exam   Constitutional: She appears well-developed and well-nourished. No distress.   HENT:   Head: Normocephalic and atraumatic.   Eyes: EOM are normal. Pupils are equal, round, and reactive to light.   Scleral icterus   Cardiovascular: Normal rate.    Pulmonary/Chest: No respiratory distress.   intubated   Abdominal: Soft.   Neurological: She is alert.   does not follow commands   Skin: Skin is warm and dry.   Jaundice skin   Vitals reviewed.      Neurological Exam:   LOC: alert, not following commands  Language: aphasic  Speech: aphasic  Orientation: aphasic  Pupils (CN III, IV, VI): PERRL  Motor*: moves all extremities spontaneously, strength 3/5 in all 4 limbs; does not move to command.     NIH Stroke Scale:    Level of Consciousness: 0 - alert  LOC Questions: 2 - answers none correctly  LOC Commands: 2 - performs neither correctly  Best Gaze: 0 - normal  Visual: 0 - no visual loss  Facial Palsy: 0 - normal  Motor Left Arm: 1 - drift  Motor Right Arm: 1 - drift  Motor Left Le - drift  Motor Right Le - drift  Limb Ataxia: 0 - absent  Sensory: 0 - normal  Best Language: 3 - mute  Dysarthria: 2 - near to unintelligible  Extinction and Inattention: 0 - no neglect  NIH Stroke Scale Total: 13      Laboratory:  CMP:     Recent Labs  Lab 17  0202 17  0752 17  1518   CALCIUM 9.2  --   --    ALBUMIN 2.5*  --   --    PROT 6.8  --   --     139  --    K 3.8  --  4.9   CO2 23  --   --      --   --    BUN 5*  --   --    CREATININE 0.8  --   --    ALKPHOS 356*  --   --    ALT 70*  --   --    AST 43*  --   --    BILITOT 4.8*  --   --      CBC:     Recent Labs  Lab 17  0202   WBC 12.19   RBC 4.12   HGB 12.7   HCT 38.2      MCV 93   MCH 30.8   MCHC 33.2     Lipid Panel:     Recent Labs  Lab 17  1449   CHOL  420*   LDLCALC 342.0*   HDL 11*   TRIG 335*     Coagulation:   Recent Labs  Lab 07/18/17  2212  07/25/17  0202   INR 1.0  < > 1.0   APTT 25.3  --   --    < > = values in this interval not displayed.  Platelet Aggregation Study: No results for input(s): PLTAGG, PLTAGINTERP, PLTAGREGLACO, ADPPLTAGGREG in the last 168 hours.  Hgb A1C:     Recent Labs  Lab 07/19/17  1449   HGBA1C 4.6     TSH:     Recent Labs  Lab 07/19/17  1449   TSH 3.018       Diagnostic Results:  I have personally reviewed:   Findings:     CT Head. Date: 07/19/17  There are CT findings of a large, acute intracranial hemorrhage involving the medial right temporal lobe with evidence of mass effect as described above.    CTA Head/Neck. Date: 07/19/17  Acute right temporal intraparenchymal hemorrhage grossly stable to the prior exam with persistent edema and localized mass effect. No significant hydrocephalus or midline shift.    Scattered atherosclerosis of the bilateral ICAs resulting in 50% stenosis of the right cavernous ICA. No large vessel occlusion or aneurysm identified.    Age advanced senescent changes.    Remote right basal ganglia and left cerebral and cerebellar infarcts, unchanged from examination performed earlier today.    CT Head. Date: 07/24/17  No interval detrimental change when compared to CT dated 07/20/2017.    Unchanged right temporal lobe parenchymal hemorrhage with stable surrounding vasogenic edema and local mass effect. No midline shift.  No new intra-or extra-axial hemorrhage.    Stable chronic findings as above.

## 2017-07-25 NOTE — ASSESSMENT & PLAN NOTE
-- Leukocytosis  -- Started empirical abx treatment with Vancomycin /Zosyn for 7 days on 7/19,   -- Pan culture pending  -- continue broad spec abx while cx's pending  -Plan for 7 day course, should finish 7/25

## 2017-07-25 NOTE — PLAN OF CARE
Problem: Physical Therapy Goal  Goal: Physical Therapy Goal  Goals to be met by: 8/3/2017     Patient will increase functional independence with mobility by performin. Pt will participate in bilateral UE and LE ROM exercises on 3 consecutive visits with no change in vitals.   2. Pt will perform rolling R and L with moderate assistance   3. Supine to sit with moderate assistance   4. Sit to supine with moderate assistance   5.  Pt will tolerate sitting EOB x 10 minutes with moderate assistance      Outcome: Ongoing (interventions implemented as appropriate)    Goals updated and appropriate to reflect pt's current mobility needs. POC changed to 4x/wk 2/2 unable to follow commands. Will progress as tolerated pending OT Evaluation.    Magali Sy, PT, DPT  [phone]  2017

## 2017-07-26 LAB
ALBUMIN SERPL BCP-MCNC: 2.7 G/DL
ALP SERPL-CCNC: 328 U/L
ALT SERPL W/O P-5'-P-CCNC: 63 U/L
AMMONIA PLAS-SCNC: 34 UMOL/L
AMYLASE SERPL-CCNC: 42 U/L
ANION GAP SERPL CALC-SCNC: 9 MMOL/L
AST SERPL-CCNC: 41 U/L
BASOPHILS # BLD AUTO: 0.07 K/UL
BASOPHILS NFR BLD: 0.7 %
BILIRUB SERPL-MCNC: 4.6 MG/DL
BUN SERPL-MCNC: 7 MG/DL
CALCIUM SERPL-MCNC: 9.3 MG/DL
CHLORIDE SERPL-SCNC: 107 MMOL/L
CHOLEST/HDLC SERPL: 9.1 {RATIO}
CO2 SERPL-SCNC: 23 MMOL/L
CREAT SERPL-MCNC: 0.7 MG/DL
DIFFERENTIAL METHOD: ABNORMAL
EOSINOPHIL # BLD AUTO: 0.5 K/UL
EOSINOPHIL NFR BLD: 5 %
ERYTHROCYTE [DISTWIDTH] IN BLOOD BY AUTOMATED COUNT: 16.4 %
EST. GFR  (AFRICAN AMERICAN): >60 ML/MIN/1.73 M^2
EST. GFR  (NON AFRICAN AMERICAN): >60 ML/MIN/1.73 M^2
GLUCOSE SERPL-MCNC: 87 MG/DL
HCT VFR BLD AUTO: 38.9 %
HDL/CHOLESTEROL RATIO: 11 %
HDLC SERPL-MCNC: 245 MG/DL
HDLC SERPL-MCNC: 27 MG/DL
HGB BLD-MCNC: 13.1 G/DL
INR PPP: 1
LDLC SERPL CALC-MCNC: 172.6 MG/DL
LIPASE SERPL-CCNC: 55 U/L
LYMPHOCYTES # BLD AUTO: 2.6 K/UL
LYMPHOCYTES NFR BLD: 24.4 %
MAGNESIUM SERPL-MCNC: 1.9 MG/DL
MCH RBC QN AUTO: 31.3 PG
MCHC RBC AUTO-ENTMCNC: 33.7 G/DL
MCV RBC AUTO: 93 FL
MONOCYTES # BLD AUTO: 1.3 K/UL
MONOCYTES NFR BLD: 12.7 %
NEUTROPHILS # BLD AUTO: 6 K/UL
NEUTROPHILS NFR BLD: 56.5 %
NONHDLC SERPL-MCNC: 218 MG/DL
PHOSPHATE SERPL-MCNC: 4.1 MG/DL
PLATELET # BLD AUTO: 170 K/UL
PMV BLD AUTO: 10.1 FL
POCT GLUCOSE: 86 MG/DL (ref 70–110)
POCT GLUCOSE: 87 MG/DL (ref 70–110)
POCT GLUCOSE: 95 MG/DL (ref 70–110)
POCT GLUCOSE: 98 MG/DL (ref 70–110)
POTASSIUM SERPL-SCNC: 3.5 MMOL/L
PROT SERPL-MCNC: 6.9 G/DL
PROTHROMBIN TIME: 10.9 SEC
RBC # BLD AUTO: 4.19 M/UL
SODIUM SERPL-SCNC: 139 MMOL/L
SODIUM SERPL-SCNC: 140 MMOL/L
TRIGL SERPL-MCNC: 227 MG/DL
VANCOMYCIN TROUGH SERPL-MCNC: 13.4 UG/ML
WBC # BLD AUTO: 10.56 K/UL

## 2017-07-26 PROCEDURE — A4216 STERILE WATER/SALINE, 10 ML: HCPCS | Performed by: NURSE PRACTITIONER

## 2017-07-26 PROCEDURE — 80053 COMPREHEN METABOLIC PANEL: CPT

## 2017-07-26 PROCEDURE — G8996 SWALLOW CURRENT STATUS: HCPCS | Mod: CN

## 2017-07-26 PROCEDURE — 63600175 PHARM REV CODE 636 W HCPCS: Performed by: NURSE PRACTITIONER

## 2017-07-26 PROCEDURE — 25000003 PHARM REV CODE 250: Performed by: PSYCHIATRY & NEUROLOGY

## 2017-07-26 PROCEDURE — 20600001 HC STEP DOWN PRIVATE ROOM

## 2017-07-26 PROCEDURE — 99233 SBSQ HOSP IP/OBS HIGH 50: CPT | Mod: ,,, | Performed by: PSYCHIATRY & NEUROLOGY

## 2017-07-26 PROCEDURE — 85610 PROTHROMBIN TIME: CPT

## 2017-07-26 PROCEDURE — 84100 ASSAY OF PHOSPHORUS: CPT

## 2017-07-26 PROCEDURE — 25000003 PHARM REV CODE 250: Performed by: NURSE PRACTITIONER

## 2017-07-26 PROCEDURE — 83690 ASSAY OF LIPASE: CPT

## 2017-07-26 PROCEDURE — 97165 OT EVAL LOW COMPLEX 30 MIN: CPT

## 2017-07-26 PROCEDURE — 80061 LIPID PANEL: CPT

## 2017-07-26 PROCEDURE — 36415 COLL VENOUS BLD VENIPUNCTURE: CPT

## 2017-07-26 PROCEDURE — 63600175 PHARM REV CODE 636 W HCPCS: Performed by: PSYCHIATRY & NEUROLOGY

## 2017-07-26 PROCEDURE — G8997 SWALLOW GOAL STATUS: HCPCS | Mod: CK

## 2017-07-26 PROCEDURE — 85025 COMPLETE CBC W/AUTO DIFF WBC: CPT

## 2017-07-26 PROCEDURE — 80202 ASSAY OF VANCOMYCIN: CPT

## 2017-07-26 PROCEDURE — A4216 STERILE WATER/SALINE, 10 ML: HCPCS | Performed by: PHYSICIAN ASSISTANT

## 2017-07-26 PROCEDURE — 25000003 PHARM REV CODE 250: Performed by: PHYSICIAN ASSISTANT

## 2017-07-26 PROCEDURE — 82140 ASSAY OF AMMONIA: CPT

## 2017-07-26 PROCEDURE — 25000003 PHARM REV CODE 250: Performed by: STUDENT IN AN ORGANIZED HEALTH CARE EDUCATION/TRAINING PROGRAM

## 2017-07-26 PROCEDURE — 83735 ASSAY OF MAGNESIUM: CPT

## 2017-07-26 PROCEDURE — 82150 ASSAY OF AMYLASE: CPT

## 2017-07-26 PROCEDURE — 92610 EVALUATE SWALLOWING FUNCTION: CPT

## 2017-07-26 PROCEDURE — 84295 ASSAY OF SERUM SODIUM: CPT

## 2017-07-26 PROCEDURE — 97530 THERAPEUTIC ACTIVITIES: CPT

## 2017-07-26 RX ORDER — LISINOPRIL 10 MG/1
10 TABLET ORAL DAILY
Status: DISCONTINUED | OUTPATIENT
Start: 2017-07-26 | End: 2017-07-27

## 2017-07-26 RX ORDER — NAPROXEN SODIUM 220 MG/1
81 TABLET, FILM COATED ORAL DAILY
Status: DISCONTINUED | OUTPATIENT
Start: 2017-07-26 | End: 2017-07-28

## 2017-07-26 RX ORDER — LEVOTHYROXINE SODIUM 25 UG/1
25 TABLET ORAL
Status: DISCONTINUED | OUTPATIENT
Start: 2017-07-27 | End: 2017-07-28

## 2017-07-26 RX ORDER — AMLODIPINE BESYLATE 10 MG/1
10 TABLET ORAL DAILY
Status: DISCONTINUED | OUTPATIENT
Start: 2017-07-26 | End: 2017-07-28

## 2017-07-26 RX ORDER — ASPIRIN 81 MG/1
81 TABLET ORAL DAILY
Status: DISCONTINUED | OUTPATIENT
Start: 2017-07-26 | End: 2017-07-26

## 2017-07-26 RX ADMIN — LACTULOSE 10 G: 20 SOLUTION ORAL at 09:07

## 2017-07-26 RX ADMIN — LISINOPRIL 10 MG: 10 TABLET ORAL at 03:07

## 2017-07-26 RX ADMIN — Medication 10 ML: at 12:07

## 2017-07-26 RX ADMIN — ASPIRIN 81 MG CHEWABLE TABLET 81 MG: 81 TABLET CHEWABLE at 03:07

## 2017-07-26 RX ADMIN — LACTULOSE 10 G: 20 SOLUTION ORAL at 03:07

## 2017-07-26 RX ADMIN — SODIUM CHLORIDE TAB 1 GM 1 G: 1 TAB at 03:07

## 2017-07-26 RX ADMIN — AMLODIPINE BESYLATE 10 MG: 10 TABLET ORAL at 03:07

## 2017-07-26 RX ADMIN — FAMOTIDINE 20 MG: 20 TABLET, FILM COATED ORAL at 03:07

## 2017-07-26 RX ADMIN — Medication 3 ML: at 02:07

## 2017-07-26 RX ADMIN — LEVETIRACETAM 500 MG: 5 INJECTION INTRAVENOUS at 09:07

## 2017-07-26 RX ADMIN — HEPARIN SODIUM 5000 UNITS: 5000 INJECTION, SOLUTION INTRAVENOUS; SUBCUTANEOUS at 10:07

## 2017-07-26 RX ADMIN — HYDRALAZINE HYDROCHLORIDE 10 MG: 20 INJECTION INTRAMUSCULAR; INTRAVENOUS at 12:07

## 2017-07-26 RX ADMIN — LEVETIRACETAM 500 MG: 5 INJECTION INTRAVENOUS at 10:07

## 2017-07-26 RX ADMIN — PIPERACILLIN AND TAZOBACTAM 4.5 G: 4; .5 INJECTION, POWDER, LYOPHILIZED, FOR SOLUTION INTRAVENOUS; PARENTERAL at 12:07

## 2017-07-26 RX ADMIN — SODIUM CHLORIDE: 0.9 INJECTION, SOLUTION INTRAVENOUS at 08:07

## 2017-07-26 RX ADMIN — Medication 3 ML: at 05:07

## 2017-07-26 RX ADMIN — HEPARIN SODIUM 5000 UNITS: 5000 INJECTION, SOLUTION INTRAVENOUS; SUBCUTANEOUS at 09:07

## 2017-07-26 RX ADMIN — VANCOMYCIN HYDROCHLORIDE 750 MG: 750 INJECTION, POWDER, LYOPHILIZED, FOR SOLUTION INTRAVENOUS at 08:07

## 2017-07-26 RX ADMIN — SODIUM CHLORIDE TAB 1 GM 1 G: 1 TAB at 09:07

## 2017-07-26 RX ADMIN — Medication 10 ML: at 05:07

## 2017-07-26 RX ADMIN — PIPERACILLIN AND TAZOBACTAM 4.5 G: 4; .5 INJECTION, POWDER, LYOPHILIZED, FOR SOLUTION INTRAVENOUS; PARENTERAL at 01:07

## 2017-07-26 RX ADMIN — FAMOTIDINE 20 MG: 20 TABLET, FILM COATED ORAL at 09:07

## 2017-07-26 RX ADMIN — Medication 3 ML: at 09:07

## 2017-07-26 RX ADMIN — Medication 10 ML: at 04:07

## 2017-07-26 RX ADMIN — PIPERACILLIN AND TAZOBACTAM 4.5 G: 4; .5 INJECTION, POWDER, LYOPHILIZED, FOR SOLUTION INTRAVENOUS; PARENTERAL at 09:07

## 2017-07-26 NOTE — NURSING TRANSFER
Nursing Transfer Note      7/25/2017     Transfer To: [id number] from [facility]    Transfer via bed    Transfer with cardiac monitoring    Transported by RN/PCT    Medicines sent: yes    Chart send with patient: Yes    Notified: RN upon arrivel to NSU    Upon arrival to floor: cardiac monitor applied, patient oriented to room, call bell in reach and bed in lowest position

## 2017-07-26 NOTE — PT/OT/SLP EVAL
Occupational Therapy  Evaluation/Discharge     Malina Robles   MRN: [id number]   Admitting Diagnosis: Nontraumatic intracerebral hemorrhage in hemisphere, unspecified    OT Date of Treatment: 07/26/17   OT Start Time: 1003  OT Stop Time: 1013  OT Total Time (min): 10 min    Billable Minutes:  Evaluation 10 minutes    Diagnosis: Nontraumatic intracerebral hemorrhage in hemisphere, unspecified       Past Medical History:   Diagnosis Date    Asthma     Coronary artery disease     Depression     Emphysema, unspecified     GERD (gastroesophageal reflux disease)     Hypertension     Thyroid disease       History reviewed. No pertinent surgical history.    General Precautions: Standard, aspiration, fall, NPO, seizure  Orthopedic Precautions: N/A  Braces: N/A    Patient History:  Living Environment  Lives With: facility resident  Transportation Available: agency transportation  Living Environment Comment: Pt did not verbalize during session. Writing theraist unable to acquire history. Refer to PT eval     Prior level of function:   N/A. Refer to PT note    Dominant hand: right    Subjective:  Communicated with nurse prior to session.  Pt agreeable to skilled OT services.  Chief Complaint: pt did not verbalize during session  Patient/Family stated goals: N/A      Objective:  Patient found with: restraints, pulse ox (continuous), peripheral IV, telemetry, bowel management system, SCD, pressure relief boots  Pt received w/ HOB elevated in 2 point wrist restraints. Pt unable to follow commands throughout session. Pt constantly reaching for NG tube w/ LUE. Pt responded to name once in sitting but pt did not track movements w/ eyes.     Cognitive Exam:  Oriented to: pt did not verbalize in session.  Follows Commands/attention: pt did not follow commands  Communication: pt did not verbalize in session  Memory:  Unable to formally test   Safety awareness/insight to disability: impaired  Coping skills/emotional control:  flat affect     Visual/perceptual:  Impaired  tracking    Physical Exam:  Postural examination/scapula alignment: Rounded shoulder and Head forward  Skin integrity: Visible skin intact  Edema: None noted     Sensation:   Unable to formally test     Upper Extremity Range of Motion:  Right Upper Extremity: Unable to test   Left Upper Extremity: Unable to test (Pt actively resisting therapist when attempting to test PROM)    Upper Extremity Strength:  Right Upper Extremity: uinable to formally test  Left Upper Extremity: unable to formally test   Strength: Not formally tested    Fine motor coordination:   Not formally tested     Gross motor coordination: not formally tested     Functional Mobility:  Bed Mobility:  Rolling/Turning Right: Total assistance  Scooting/Bridging: Total Assistance  Supine to Sit: Total Assistance  Sit to Supine: Moderate Assistance (leg management)    Transfers:       Functional Ambulation: N/A    Activities of Daily Living:  UE Dressing Level of Assisstance: Total Assistance (donned/doffed gown as shirt)    Balance:   Static Sit: FAIR+: Able to take MINIMAL challenges from all directions  Dynamic Sit: FAIR+: Maintains balance through MINIMAL excursions of active trunk motion  Static Stand: 0: Needs MAXIMAL assist to maintain   Dynamic stand: 0: N/A    Therapeutic Activities and Exercises:  Pt sat EOB ~5 minutes at sba.   PROM/AROM attempted on LUE, but pt actively resisting writting therapist.      AM-PAC 6 CLICK ADL  How much help from another person does this patient currently need?  1 = Unable, Total/Dependent Assistance  2 = A lot, Maximum/Moderate Assistance  3 = A little, Minimum/Contact Guard/Supervision  4 = None, Modified [place]/Independent    Putting on and taking off regular lower body clothing? : 1  Bathing (including washing, rinsing, drying)?: 1  Toileting, which includes using toilet, bedpan, or urinal? : 1  Putting on and taking off regular upper body clothing?:  "1  Taking care of personal grooming such as brushing teeth?: 1  Eating meals?: 1  Total Score: 6    AM-PAC Raw Score CMS "G-Code Modifier Level of Impairment Assistance   6 % Total / Unable   7 - 9 CM 80 - 100% Maximal Assist   10-14 CL 60 - 80% Moderate Assist   15 - 19 CK 40 - 60% Moderate Assist   20 - 22 CJ 20 - 40% Minimal Assist   23 CI 1-20% SBA / CGA   24 CH 0% Independent/ Mod I       Patient left HOB elevated with all lines intact, call button in reach and restraints reapplied at end of session    Assessment:  Malina Robles is a 57 y.o. female with a medical diagnosis of Nontraumatic intracerebral hemorrhage in hemisphere, unspecified and presents with Impairments listed below. Pt is currently not appropriate for 2 therapies 2* to decreased command following and therapy tolerance.     Rehab identified problem list/impairments: Rehab identified problem list/impairments: weakness, impaired endurance, impaired self care skills, impaired functional mobilty, gait instability, impaired balance, impaired cognition, decreased lower extremity function, decreased upper extremity function, decreased safety awareness, decreased ROM, impaired coordination, impaired fine motor, impaired muscle length, impaired joint extensibility    Rehab potential is fair.    Activity tolerance: Fair    Discharge recommendations: Discharge Facility/Level Of Care Needs: nursing facility, skilled     Barriers to discharge: Barriers to Discharge: None    Equipment recommendations: none     GOALS:    Occupational Therapy Goals        Problem: Occupational Therapy Goal    Goal Priority Disciplines Outcome Interventions   Occupational Therapy Goal     OT, PT/OT     Description:  D/C acute OT services. Pt not currently appropriate for 2 therapies. PT to follow.                    PLAN:  Patient to be seen  (D/C acut OT services. PT to follow) to address the above listed problems via    Plan of Care expires:    Plan of Care " reviewed with: patient    Nic MARCELLE Acharya, OT  07/26/2017

## 2017-07-26 NOTE — NURSING
Spoke to Dr. Vásquez re: pt's NGT replacement this AM and KUB results. States OK to use NGT. Requested MD place order. [facility].    1125: Spoke to Dr. Vásquez, states to pull NGT out ~4-4.5 cm, will recheck KUB. Will continue to monitor.    1135: Updated Dr. Vásquez that NGT was pulled out from 58 cm to 53.5 cm. Requested KUB order. WCTM.

## 2017-07-26 NOTE — NURSING
MD with vascular neurology to room, made him aware new NGT was placed overnight, requested KUB read and OK to use order. WCTM.

## 2017-07-26 NOTE — PT/OT/SLP EVAL
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, unspecified    Diet recommendations: Solid Diet Level: NPO  Liquid Diet Level: NPO strict aspiration precautions    SLP Treatment Date: 07/26/17  Speech Start Time: 1248     Speech Stop Time: 1257     Speech Total (min): 9 min       TREATMENT BILLABLE MINUTES:  Eval Swallow and Oral Function 9    Diagnosis: Nontraumatic intracerebral hemorrhage in hemisphere, unspecified      Past Medical History:   Diagnosis Date    Asthma     Coronary artery disease     Depression     Emphysema, unspecified     GERD (gastroesophageal reflux disease)     Hypertension     Thyroid disease      History reviewed. No pertinent surgical history.    Has the patient been evaluated by SLP for swallowing? : Yes  Keep patient NPO?: Yes   General Precautions: Standard, aspiration, fall, NPO, seizure          Social Hx: Patient poor historian, limited verbalizations    Prior diet: unable to determine.    Subjective:  Awake, unable to follow commands, limited verbalizations      Pain/Comfort  Pain Rating 1: 0/10  Pain Rating Post-Intervention 1: 0/10    Objective:        Oral Musculature Evaluation  Oral Musculature: unable to assess due to poor participation/comprehension  Dentition: scattered dentition  Voice Prior to PO Intake: hoarse     Bedside Swallow Eval:  Consistencies Assessed: Thin liquids x7 spoon, cup, straw and Puree x3  Oral Phase: WFL  Pharyngeal Phase: decreased hyolaryngeal excursion, no overt clinical  signs/symptoms of aspiration and multiple spontaneous swallows    Although no overt s/s of aspiration noted with PO trials, pt unable to produce volitional cough, voice, or throat clear post PO trials and max cues. Pt with multiple swallow and decreased hyolaryngeal excursion with all trials.  Recommend NPO diet at this time. Further assessment required.      Assessment:  Malina Robles is a 57 y.o. female with a medical diagnosis of Nontraumatic intracerebral hemorrhage in hemisphere, unspecified and presents with Dysphagia.          Discharge recommendations: Discharge Facility/Level Of Care Needs: nursing facility, skilled     Goals:    SLP Goals        Problem: SLP Goal    Goal Priority Disciplines Outcome   SLP Goal     SLP    Description:  Speech Language Pathology Goals  Goals expected to be met by 8/2    1. Pt will participate in ongoing swallow evaluation  2. Pt will participate in speech language cognitive evaluation                         Plan:   Patient to be seen Therapy Frequency: 5 x/week   Plan of Care expires: 08/24/17  Plan of Care reviewed with: patient  SLP Follow-up?: Yes              Elyssa Brothers CCC-SLP   Speech Language Pathologist  Pager [phone]  07/26/2017

## 2017-07-26 NOTE — PT/OT/SLP PROGRESS
"Physical Therapy  Treatment    Malina Robles   MRN: [id number]   Admitting Diagnosis: Nontraumatic intracerebral hemorrhage in hemisphere, unspecified    PT Received On: 07/26/17  PT Start Time: 1435     PT Stop Time: 1503    PT Total Time (min): 28 min       Billable Minutes:  Therapeutic Activity 25    Treatment Type: Treatment  PT/PTA: PT          General Precautions: Standard, aspiration, fall, NPO, seizure    Subjective:  Communicated with RN (Adeline) prior to session.    "Shameka." pt states name two times this session.    Pain/Comfort  Pain Rating 1: 0/10  Pain Rating Post-Intervention 1: 0/10 (NAD; resting quietly)    Objective:   Patient found with: pulse ox (continuous), restraints, telemetry, SCD, NG tube, PICC line      Therapeutic Activities:  PT arrived to pt's room to find pt resting quietly; agreeable to PT session. Pt performed mobility as below. Upon return to supine, PT assisted pt toward HOB and positioned in neutral with HOB elevated and restraints in place. RN present to administer meds and updated on pt's performance and mobility needs.    Functional Mobility:  Bed Mobility:   Rolling/Turning to Left: Total assistance  Rolling/Turning Right: Moderate assistance  Scooting/Bridging: Moderate Assistance  Supine to Sit: Moderate Assistance (pt with excellent initiation to come to EOB; however req mod A to acheive log roll technique)  Sit to Supine: Moderate Assistance (pt able to initiate return to supine with VCs; req max A for mnmgnt of trunk and B LE)    Transfers:  Sit <> Stand Assistance: Activity did not occur (pt with inconsistent tremors/sitting balance)    Sitting Balance at Edge of Bed:   Assistance Level Required: Contact Guard Assistance, Minimal Assistance and Moderate Assistance   Time: x15 min total   Postural deviations noted: Rounded shoulder, Head forward, Affected scapula elevated, Affected scapula abducted, Affected scapula upwardly rotated, Posterior pelvic tilt, Lateral " weight shift of hips and Abnormal trunk flexion   Encouraged: midline orientation, upright posture, attention to task. While sitting EOB, pt able to reach outside YOVANI with L UE x3 instances to command, pt able to lift L foot to command for PT to provide socks, R hand positioned in neutral with gentle stretch of Wrist and finger extensors. Pt with forward flexed and R lateral posture req min-mod for balance correction. Pt with frequent head bobbing and reaching to NG tube; however, no attempts to dislodge tube.    AM-PAC 6 CLICK MOBILITY  How much help from another person does this patient currently need?   1 = Unable, Total/Dependent Assistance  2 = A lot, Maximum/Moderate Assistance  3 = A little, Minimum/Contact Guard/Supervision  4 = None, Modified [place]/Independent    Turning over in bed (including adjusting bedclothes, sheets and blankets)?: 2  Sitting down on and standing up from a chair with arms (e.g., wheelchair, bedside commode, etc.): 2  Moving from lying on back to sitting on the side of the bed?: 2  Moving to and from a bed to a chair (including a wheelchair)?: 2  Need to walk in hospital room?: 1  Climbing 3-5 steps with a railing?: 1  Total Score: 10    AM-PAC Raw Score CMS G-Code Modifier Level of Impairment Assistance   6 % Total / Unable   7 - 9 CM 80 - 100% Maximal Assist   10 - 14 CL 60 - 80% Moderate Assist   15 - 19 CK 40 - 60% Moderate Assist   20 - 22 CJ 20 - 40% Minimal Assist   23 CI 1-20% SBA / CGA   24 CH 0% Independent/ Mod I     Patient left HOB elevated with all lines intact, call button in reach, bed alarm on, restraints reapplied at end of session, RN notified and RN present.    Assessment:  Malina Robles is a 57 y.o. female with a medical diagnosis of Nontraumatic intracerebral hemorrhage in hemisphere, unspecified and presents with improved participation with PT this PM. Pt able to follow 4/5 simple commands with 50% accuracy; good initiation with fair follow  through. Pt currently req mod-max A for all mobility. Will progress as tolerate. Patient will benefit from continued PT services to address:    Rehab identified problem list/impairments: Rehab identified problem list/impairments: weakness, impaired endurance, impaired sensation, gait instability, impaired functional mobilty, impaired self care skills, impaired balance, visual deficits, impaired cognition, decreased lower extremity function, decreased upper extremity function, decreased coordination, decreased safety awareness, abnormal tone, impaired coordination, impaired skin, impaired fine motor    Rehab potential is good.    Activity tolerance: Good    Discharge recommendations: Discharge Facility/Level Of Care Needs: nursing facility, skilled, nursing facility, basic     Barriers to discharge: Barriers to Discharge: None    Equipment recommendations: Equipment Needed After Discharge: none     GOALS:    Physical Therapy Goals        Problem: Physical Therapy Goal    Goal Priority Disciplines Outcome Goal Variances Interventions   Physical Therapy Goal     PT/OT, PT Ongoing (interventions implemented as appropriate)     Description:  Goals to be met by: 8/3/2017     Patient will increase functional independence with mobility by performin. Pt will participate in bilateral UE and LE ROM exercises on 3 consecutive visits with no change in vitals.   2. Pt will perform rolling R and L with moderate assistance   3. Supine to sit with moderate assistance   4. Sit to supine with moderate assistance   5.  Pt will tolerate sitting EOB x 10 minutes with moderate assistance                      PLAN:    Patient to be seen 4 x/week  to address the above listed problems via gait training, therapeutic activities, therapeutic exercises, neuromuscular re-education  Plan of Care expires: 17  Plan of Care reviewed with: patient     Magali AYDE Sy, PT, DPT  [phone]  2017

## 2017-07-26 NOTE — NURSING
"Spoke to neurology re: pt nodding head "yes" when asked if she was in pain. Pt unable to verbalize location of the pain. Per MD, no new orders at this time, notify MD if pt begins to appear uncomfortable. Pt currently resting in bed, appearing comfortable, WCTM.  "

## 2017-07-26 NOTE — PLAN OF CARE
Problem: Patient Care Overview  Goal: Plan of Care Review  Outcome: Ongoing (interventions implemented as appropriate)  POC reviewed with patient, no evidence of learning. Patient remains nonverbal, does not follow commands. Able to move all limbs spontaneously. NGT remains in R. Nare, per MD order retracted tube to 59cm and ordered new KUB. Results pending. 0715 Vanc requested from pharmacy, trough pulled and pending. Bilateral soft wrist restraints maintained, patient still attempting to remove NGT. PRN hydralazine required overnight for BP over parameters. All safety precautions maintained.

## 2017-07-26 NOTE — NURSING
Called pharmacy to request Zosyn, tech states it was recently tubed up to floor.    1148: Called pharmacy re: Zosyn not yet delivered to floor. Tech states order has been changed, new bag to be delivered per new order. [facility].    1152: Spoke to pharmacy re: timing of medication re: new Zosyn order. Will administer per old order and request time update.

## 2017-07-26 NOTE — PROGRESS NOTES
Two calls placed to Xray at [phone] and [phone] in order to obtain patients KUB. No answer on either line. Charge RN made aware.

## 2017-07-26 NOTE — PLAN OF CARE
Problem: Occupational Therapy Goal  Goal: Occupational Therapy Goal  D/C acute OT services. Pt not currently appropriate for 2 therapies. PT to follow.  D/C acute OT services     Comments: Nic Acharya OTR/L  7/26/2017

## 2017-07-27 PROBLEM — D72.829 LEUKOCYTOSIS: Status: RESOLVED | Noted: 2017-07-19 | Resolved: 2017-07-27

## 2017-07-27 PROBLEM — E87.6 HYPOKALEMIA: Status: ACTIVE | Noted: 2017-07-27

## 2017-07-27 PROBLEM — K72.91 HEPATIC COMA/ENCEPHALOPATHY: Status: RESOLVED | Noted: 2017-07-14 | Resolved: 2017-07-27

## 2017-07-27 PROBLEM — R79.1 ELEVATED INR: Status: RESOLVED | Noted: 2017-07-13 | Resolved: 2017-07-27

## 2017-07-27 PROBLEM — B18.2 CHRONIC HEPATITIS C WITH HEPATIC COMA: Status: RESOLVED | Noted: 2017-07-19 | Resolved: 2017-07-27

## 2017-07-27 LAB
ALBUMIN SERPL BCP-MCNC: 2.5 G/DL
ALP SERPL-CCNC: 276 U/L
ALT SERPL W/O P-5'-P-CCNC: 52 U/L
AMMONIA PLAS-SCNC: 40 UMOL/L
AMYLASE SERPL-CCNC: 42 U/L
ANION GAP SERPL CALC-SCNC: 10 MMOL/L
AST SERPL-CCNC: 37 U/L
BASOPHILS # BLD AUTO: 0.09 K/UL
BASOPHILS NFR BLD: 1 %
BILIRUB SERPL-MCNC: 3.9 MG/DL
BUN SERPL-MCNC: 10 MG/DL
CALCIUM SERPL-MCNC: 8.7 MG/DL
CHLORIDE SERPL-SCNC: 106 MMOL/L
CO2 SERPL-SCNC: 24 MMOL/L
CREAT SERPL-MCNC: 0.9 MG/DL
DIFFERENTIAL METHOD: ABNORMAL
EOSINOPHIL # BLD AUTO: 0.4 K/UL
EOSINOPHIL NFR BLD: 4.1 %
ERYTHROCYTE [DISTWIDTH] IN BLOOD BY AUTOMATED COUNT: 16.1 %
EST. GFR  (AFRICAN AMERICAN): >60 ML/MIN/1.73 M^2
EST. GFR  (NON AFRICAN AMERICAN): >60 ML/MIN/1.73 M^2
GLUCOSE SERPL-MCNC: 176 MG/DL
HCT VFR BLD AUTO: 34 %
HGB BLD-MCNC: 11.3 G/DL
INR PPP: 1
LIPASE SERPL-CCNC: 62 U/L
LYMPHOCYTES # BLD AUTO: 2.3 K/UL
LYMPHOCYTES NFR BLD: 26.6 %
MAGNESIUM SERPL-MCNC: 1.9 MG/DL
MCH RBC QN AUTO: 31 PG
MCHC RBC AUTO-ENTMCNC: 33.2 G/DL
MCV RBC AUTO: 93 FL
MONOCYTES # BLD AUTO: 1.1 K/UL
MONOCYTES NFR BLD: 13.2 %
NEUTROPHILS # BLD AUTO: 4.7 K/UL
NEUTROPHILS NFR BLD: 54.6 %
PHOSPHATE SERPL-MCNC: 4 MG/DL
PLATELET # BLD AUTO: 154 K/UL
PMV BLD AUTO: 10 FL
POCT GLUCOSE: 104 MG/DL (ref 70–110)
POCT GLUCOSE: 195 MG/DL (ref 70–110)
POCT GLUCOSE: 92 MG/DL (ref 70–110)
POTASSIUM SERPL-SCNC: 2.9 MMOL/L
PROT SERPL-MCNC: 6.4 G/DL
PROTHROMBIN TIME: 10.6 SEC
RBC # BLD AUTO: 3.64 M/UL
SODIUM SERPL-SCNC: 140 MMOL/L
WBC # BLD AUTO: 8.64 K/UL

## 2017-07-27 PROCEDURE — 63600175 PHARM REV CODE 636 W HCPCS: Performed by: NURSE PRACTITIONER

## 2017-07-27 PROCEDURE — 97112 NEUROMUSCULAR REEDUCATION: CPT

## 2017-07-27 PROCEDURE — 97530 THERAPEUTIC ACTIVITIES: CPT

## 2017-07-27 PROCEDURE — 25000003 PHARM REV CODE 250: Performed by: PSYCHIATRY & NEUROLOGY

## 2017-07-27 PROCEDURE — 99233 SBSQ HOSP IP/OBS HIGH 50: CPT | Mod: ,,, | Performed by: PSYCHIATRY & NEUROLOGY

## 2017-07-27 PROCEDURE — 82140 ASSAY OF AMMONIA: CPT

## 2017-07-27 PROCEDURE — 85025 COMPLETE CBC W/AUTO DIFF WBC: CPT

## 2017-07-27 PROCEDURE — 84100 ASSAY OF PHOSPHORUS: CPT

## 2017-07-27 PROCEDURE — 80053 COMPREHEN METABOLIC PANEL: CPT

## 2017-07-27 PROCEDURE — 36415 COLL VENOUS BLD VENIPUNCTURE: CPT

## 2017-07-27 PROCEDURE — 83735 ASSAY OF MAGNESIUM: CPT

## 2017-07-27 PROCEDURE — 97110 THERAPEUTIC EXERCISES: CPT

## 2017-07-27 PROCEDURE — A4216 STERILE WATER/SALINE, 10 ML: HCPCS | Performed by: PHYSICIAN ASSISTANT

## 2017-07-27 PROCEDURE — 25000003 PHARM REV CODE 250: Performed by: PHYSICIAN ASSISTANT

## 2017-07-27 PROCEDURE — 25000003 PHARM REV CODE 250: Performed by: NURSE PRACTITIONER

## 2017-07-27 PROCEDURE — 20600001 HC STEP DOWN PRIVATE ROOM

## 2017-07-27 PROCEDURE — 85610 PROTHROMBIN TIME: CPT

## 2017-07-27 PROCEDURE — A4216 STERILE WATER/SALINE, 10 ML: HCPCS | Performed by: NURSE PRACTITIONER

## 2017-07-27 PROCEDURE — 82150 ASSAY OF AMYLASE: CPT

## 2017-07-27 PROCEDURE — 63600175 PHARM REV CODE 636 W HCPCS: Performed by: PSYCHIATRY & NEUROLOGY

## 2017-07-27 PROCEDURE — 92523 SPEECH SOUND LANG COMPREHEN: CPT

## 2017-07-27 PROCEDURE — 83690 ASSAY OF LIPASE: CPT

## 2017-07-27 PROCEDURE — 25000003 PHARM REV CODE 250: Performed by: STUDENT IN AN ORGANIZED HEALTH CARE EDUCATION/TRAINING PROGRAM

## 2017-07-27 RX ORDER — PRAVASTATIN SODIUM 20 MG/1
40 TABLET ORAL NIGHTLY
Status: DISCONTINUED | OUTPATIENT
Start: 2017-07-27 | End: 2017-07-31 | Stop reason: HOSPADM

## 2017-07-27 RX ORDER — LISINOPRIL 20 MG/1
20 TABLET ORAL DAILY
Status: DISCONTINUED | OUTPATIENT
Start: 2017-07-28 | End: 2017-07-28

## 2017-07-27 RX ORDER — SODIUM CHLORIDE AND POTASSIUM CHLORIDE 150; 900 MG/100ML; MG/100ML
INJECTION, SOLUTION INTRAVENOUS CONTINUOUS
Status: DISCONTINUED | OUTPATIENT
Start: 2017-07-27 | End: 2017-07-28

## 2017-07-27 RX ORDER — POTASSIUM CHLORIDE 20 MEQ/15ML
40 SOLUTION ORAL EVERY 4 HOURS
Status: COMPLETED | OUTPATIENT
Start: 2017-07-27 | End: 2017-07-27

## 2017-07-27 RX ADMIN — POTASSIUM CHLORIDE 40 MEQ: 20 SOLUTION ORAL at 06:07

## 2017-07-27 RX ADMIN — FAMOTIDINE 20 MG: 20 TABLET, FILM COATED ORAL at 10:07

## 2017-07-27 RX ADMIN — LACTULOSE 10 G: 20 SOLUTION ORAL at 10:07

## 2017-07-27 RX ADMIN — PIPERACILLIN AND TAZOBACTAM 4.5 G: 4; .5 INJECTION, POWDER, LYOPHILIZED, FOR SOLUTION INTRAVENOUS; PARENTERAL at 11:07

## 2017-07-27 RX ADMIN — LEVOTHYROXINE SODIUM 25 MCG: 25 TABLET ORAL at 05:07

## 2017-07-27 RX ADMIN — Medication 3 ML: at 10:07

## 2017-07-27 RX ADMIN — HEPARIN SODIUM 5000 UNITS: 5000 INJECTION, SOLUTION INTRAVENOUS; SUBCUTANEOUS at 09:07

## 2017-07-27 RX ADMIN — POTASSIUM CHLORIDE 40 MEQ: 20 SOLUTION ORAL at 09:07

## 2017-07-27 RX ADMIN — LISINOPRIL 10 MG: 10 TABLET ORAL at 09:07

## 2017-07-27 RX ADMIN — HEPARIN SODIUM 5000 UNITS: 5000 INJECTION, SOLUTION INTRAVENOUS; SUBCUTANEOUS at 10:07

## 2017-07-27 RX ADMIN — Medication 10 ML: at 06:07

## 2017-07-27 RX ADMIN — LEVETIRACETAM 500 MG: 5 INJECTION INTRAVENOUS at 09:07

## 2017-07-27 RX ADMIN — Medication 10 ML: at 12:07

## 2017-07-27 RX ADMIN — SODIUM CHLORIDE TAB 1 GM 1 G: 1 TAB at 05:07

## 2017-07-27 RX ADMIN — SODIUM CHLORIDE TAB 1 GM 1 G: 1 TAB at 10:07

## 2017-07-27 RX ADMIN — PIPERACILLIN AND TAZOBACTAM 4.5 G: 4; .5 INJECTION, POWDER, LYOPHILIZED, FOR SOLUTION INTRAVENOUS; PARENTERAL at 05:07

## 2017-07-27 RX ADMIN — ASPIRIN 81 MG CHEWABLE TABLET 81 MG: 81 TABLET CHEWABLE at 09:07

## 2017-07-27 RX ADMIN — FAMOTIDINE 20 MG: 20 TABLET, FILM COATED ORAL at 09:07

## 2017-07-27 RX ADMIN — PRAVASTATIN SODIUM 40 MG: 20 TABLET ORAL at 10:07

## 2017-07-27 RX ADMIN — SODIUM CHLORIDE TAB 1 GM 1 G: 1 TAB at 03:07

## 2017-07-27 RX ADMIN — Medication 10 ML: at 03:07

## 2017-07-27 RX ADMIN — Medication 3 ML: at 05:07

## 2017-07-27 RX ADMIN — LACTULOSE 10 G: 20 SOLUTION ORAL at 03:07

## 2017-07-27 RX ADMIN — SODIUM CHLORIDE AND POTASSIUM CHLORIDE: .9; .15 SOLUTION INTRAVENOUS at 10:07

## 2017-07-27 RX ADMIN — HYDRALAZINE HYDROCHLORIDE 10 MG: 20 INJECTION INTRAMUSCULAR; INTRAVENOUS at 06:07

## 2017-07-27 RX ADMIN — Medication 3 ML: at 04:07

## 2017-07-27 RX ADMIN — POTASSIUM CHLORIDE 40 MEQ: 20 SOLUTION ORAL at 03:07

## 2017-07-27 RX ADMIN — LACTULOSE 10 G: 20 SOLUTION ORAL at 05:07

## 2017-07-27 RX ADMIN — AMLODIPINE BESYLATE 10 MG: 10 TABLET ORAL at 09:07

## 2017-07-27 RX ADMIN — PIPERACILLIN AND TAZOBACTAM 4.5 G: 4; .5 INJECTION, POWDER, LYOPHILIZED, FOR SOLUTION INTRAVENOUS; PARENTERAL at 04:07

## 2017-07-27 NOTE — PROGRESS NOTES
[facility]  Vascular Neurology  Comprehensive Stroke Center  Progress Note    Assessment/Plan:     7/19: AMS this AM. CT head shows R temporal ICH with IVH. Transferred to [facility] ED and admitted to [facility]. Of note, patient's initial INR was 4.9.     07/20: remains intubated and on cardene, patient not sedated. See by GI for bile duct stone and plans to repeat ERCP. Patient with leukocytosis-cultures pending, on vanc/zosyn.   7/21/17 Remains intubated. CTA without abnormality to explain ICH.   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 Globally aphasic. Intermittent jaw movements. 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 Pending  EEG report. Pending ERCG and PEG.   7/27/17: neuro exam stable, still not following commands; d/c keppra, repeat EEG/MBSS tomorrow; ERCP next week    Hypokalemia    K+ 2.9   - Kcl 40 mEq x 3 PO  - Add K+ to IVFs        Vasogenic cerebral edema    2/2 ICH  Evident on imaging        E-coli UTI    - Afebrile, no leukocytosis  - Continue Zosyn pending ERCP        Calculus of bile duct without mention of cholecystitis, with obstruction    - Temporary stent in place, biliary system decompressed with improved hepatic function  - ERCP per GI early next week        Essential hypertension    - Stroke risk factor  - SBP<140 in setting of bleed  - Elevated BP overnight to 170s systolic  - Increasing Lisinopril 20 mg daily        Elevated liver enzymes    AST/ALT improving   Management per primary team  INR 4.9 previously; now normal at 1.0        Cholelithiasis    - ERCP per [facility] early next week        Jaundice    Patient with history of hep C and with current bile duct  stone, had stent placed at OSH  GI following  AST 43 ALT 70  t bili 4.8  Will need repeat ERCP        * Nontraumatic intracerebral hemorrhage in hemisphere, unspecified    57 y.o. female PMH hepatitis C with transaminitis, CAD, thyroid disease, and h/o CVA on ASA, plavix. CT with R temporal ICH. CTA without evidence of etiology and ECHO EF 60, normal atrial size.      Antiplatelets: start ASA  Statins: Repeat LDL 170s; will start Pravastatin 40 mg; monitor LFTs  SBP < 140  Diagnostics: MRI brain with contrast in 1 month, Pending EEG  DVT ppx: TEDs, SCDs  PT/OT and speech to evaluate and treat  Neuro checks q4    Multi podus boot for feet; stroke soft wrist splint for RUE            Neurologic Chief Complaint: R temporal ICH    Subjective:     Interval History: Patient is seen for follow-up neurological assessment and treatment recommendations: NAEON, alert, globally aphasic, still working with speech to determine need for PEG.    HPI, Past Medical, Family, and Social History remains the same as documented in the initial encounter.     Review of Systems   Constitutional: Negative for fever.   Respiratory: Negative for cough.    Cardiovascular: Negative for leg swelling.   Gastrointestinal: Negative for vomiting.   Skin: Positive for color change.   Neurological: Positive for speech difficulty. Negative for weakness.   Psychiatric/Behavioral: Negative for agitation.     Scheduled Meds:   amlodipine  10 mg Per NG tube Daily    aspirin  81 mg Per NG tube Daily    famotidine  20 mg Per NG tube BID    heparin (porcine)  5,000 Units Subcutaneous Q12H    lactulose  10 g Per NG tube TID    levothyroxine  25 mcg Per NG tube Before breakfast    lisinopril  10 mg Per NG tube Daily    piperacillin-tazobactam 4.5 g in dextrose 5 % 100 mL IVPB (ready to mix system)  4.5 g Intravenous Q8H    potassium chloride 10%  40 mEq Per NG tube Q4H    pravastatin  40 mg Oral QHS    sodium chloride 0.9%  10 mL Intravenous Q6H     sodium chloride 0.9%  3 mL Intravenous Q8H    sodium chloride  1 g Per NG tube TID     Continuous Infusions:   0/9% NACL & POTASSIUM CHLORIDE 20 MEQ/L 75 mL/hr at 07/27/17 1046    sodium chloride 0.9% 75 mL/hr at 07/26/17 0821     PRN Meds:fentaNYL, hydrALAZINE, labetalol, Flushing PICC Protocol **AND** sodium chloride 0.9% **AND** sodium chloride 0.9%    Objective:     Vital Signs (Most Recent):  Temp: 98.6 °F (37 °C) (07/27/17 0300)  Pulse: 87 (07/27/17 0300)  Resp: 18 (07/27/17 0300)  BP: (!) 161/85 (post administration) (07/27/17 0637)  SpO2: 96 % (07/27/17 0300)  BP Location: Left arm    Vital Signs Range (Last 24H):  Temp:  [97.8 °F (36.6 °C)-98.6 °F (37 °C)]   Pulse:  [70-87]   Resp:  [17-18]   BP: (126-170)/(72-92)   SpO2:  [96 %-98 %]   BP Location: Left arm    Physical Exam   Constitutional: No distress.   Thin middle aged woman, resting in bed, no distress   HENT:   Head: Normocephalic and atraumatic.   Eyes: EOM are normal. Pupils are equal, round, and reactive to light.   Scleral icterus   Cardiovascular: Normal rate.    Pulmonary/Chest: Effort normal. No respiratory distress.   Abdominal: Soft.   Musculoskeletal: Normal range of motion. She exhibits no edema.   Neurological: She is alert. GCS eye subscore is 4. GCS verbal subscore is 5. GCS motor subscore is 6.   Global aphasia, not following commands   Skin: Skin is warm and dry.   Jaundice skin   Vitals reviewed.      Neurological Exam:   LOC: alert, not following commands  Language: aphasic  Speech: aphasic  Orientation: aphasic  Pupils (CN III, IV, VI): PERRL  Motor*: moves all extremities spontaneously, strength 4/5 in all 4 limbs; does not move to command.     NIH Stroke Scale:    Level of Consciousness: 0 - alert  LOC Questions: 2 - answers none correctly  LOC Commands: 2 - performs neither correctly  Best Gaze: 0 - normal  Visual: 0 - no visual loss  Facial Palsy: 0 - normal  Motor Left Arm: 0 - no drift  Motor Right Arm: 0 - no drift  Motor  Left Le - drift  Motor Right Le - drift  Limb Ataxia: 0 - absent  Sensory: 0 - normal  Best Language: 3 - mute  Dysarthria: 2 - near to unintelligible  Extinction and Inattention: 0 - no neglect  NIH Stroke Scale Total: 11  [place] Coma Scale:  Best Eye Response: 4  Best Motor Response: 6  Best Verbal Response: 5        Laboratory:  CMP:     Recent Labs  Lab 17  0629   CALCIUM 8.7   ALBUMIN 2.5*   PROT 6.4     140  140   K 2.9*   CO2 24      BUN 10   CREATININE 0.9   ALKPHOS 276*   ALT 52*   AST 37   BILITOT 3.9*     CBC:     Recent Labs  Lab 17  0629   WBC 8.64   RBC 3.64*   HGB 11.3*   HCT 34.0*      MCV 93   MCH 31.0   MCHC 33.2     Lipid Panel:     Recent Labs  Lab 17  1708   CHOL 245*   LDLCALC 172.6*   HDL 27*   TRIG 227*     Coagulation:     Recent Labs  Lab 17  0629   INR 1.0     Platelet Aggregation Study: No results for input(s): PLTAGG, PLTAGINTERP, PLTAGREGLACO, ADPPLTAGGREG in the last 168 hours.  Hgb A1C:   No results for input(s): HGBA1C in the last 168 hours.  TSH:   No results for input(s): TSH in the last 168 hours.    Diagnostic Results:  I have personally reviewed:   Findings:     CT Head. Date: 17  There are CT findings of a large, acute intracranial hemorrhage involving the medial right temporal lobe with evidence of mass effect as described above.    CTA Head/Neck. Date: 17  Acute right temporal intraparenchymal hemorrhage grossly stable to the prior exam with persistent edema and localized mass effect. No significant hydrocephalus or midline shift.    Scattered atherosclerosis of the bilateral ICAs resulting in 50% stenosis of the right cavernous ICA. No large vessel occlusion or aneurysm identified.    Age advanced senescent changes.    Remote right basal ganglia and left cerebral and cerebellar infarcts, unchanged from examination performed earlier today.    CT Head. Date: 17  No interval detrimental change when compared  to CT dated 07/20/2017.    Unchanged right temporal lobe parenchymal hemorrhage with stable surrounding vasogenic edema and local mass effect. No midline shift.  No new intra-or extra-axial hemorrhage.    Stable chronic findings as above.      Fahad Herman MD  Comprehensive Stroke Center  Department of Vascular Neurology   [facility]

## 2017-07-27 NOTE — PLAN OF CARE
Problem: Patient Care Overview  Goal: Plan of Care Review  Plan of care reviewed with pt. Pt. Nods but is not verbal.  Pt. Free from injury or falls this shift.  No skin breakdown noted nor c/o pain aeb nonverbal cues.  Safety precautions maintained with side rails up x3, bed in low pos., bed alarm set, call bell in reach, and HOB up 45 degrees.  No residual noted to tf. Will cont. To monitor.

## 2017-07-27 NOTE — SUBJECTIVE & OBJECTIVE
Neurologic Chief Complaint:   R temporal ICH    Subjective:     Interval History: Not following commands. Globally aphasic. Intermittent jaw movements. 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 Pending  EEG report. Pending ERCG and PEG.     HPI, Past Medical, Family, and Social History remains the same as documented in the initial encounter.     Review of Systems   Unable to perform ROS: Patient nonverbal     Scheduled Meds:   amlodipine  10 mg Per NG tube Daily    aspirin  81 mg Per NG tube Daily    famotidine  20 mg Per NG tube BID    heparin (porcine)  5,000 Units Subcutaneous Q12H    lactulose  10 g Per NG tube TID    levetiracetam in NaCl (iso-os)  500 mg Intravenous Q12H    levothyroxine  25 mcg Per NG tube Before breakfast    lisinopril  10 mg Per NG tube Daily    piperacillin-tazobactam 4.5 g in dextrose 5 % 100 mL IVPB (ready to mix system)  4.5 g Intravenous Q8H    sodium chloride 0.9%  10 mL Intravenous Q6H    sodium chloride 0.9%  3 mL Intravenous Q8H    sodium chloride  1 g Per NG tube TID     Continuous Infusions:   sodium chloride 0.9% 75 mL/hr at 07/26/17 0821     PRN Meds:fentaNYL, hydrALAZINE, labetalol, Flushing PICC Protocol **AND** sodium chloride 0.9% **AND** sodium chloride 0.9%    Objective:     Vital Signs (Most Recent):  Temp: 97.8 °F (36.6 °C) (07/26/17 1231)  Pulse: 85 (07/26/17 2300)  Resp: 17 (07/26/17 1231)  BP: (!) 146/84 (07/26/17 1231)  SpO2: 97 % (07/26/17 1231)  BP Location: Left arm    Vital Signs Range (Last 24H):  Temp:  [97.4 °F (36.3 °C)-98.1 °F (36.7 °C)]   Pulse:  [65-85]   Resp:  [16-20]   BP: (139-174)/(73-96)   SpO2:  [96 %-98 %]   BP Location: Left arm    Physical Exam   Neurological: GCS eye subscore is 4 - spontaneous. GCS verbal subscore is 1 - none. GCS motor subscore is 4 - withdraws from pain.      Constitutional: She appears well-developed and well-nourished. No distress.   HENT:   Head: Normocephalic and atraumatic.   Eyes: EOM are normal. Pupils are equal,  round, and reactive to light.   Scleral icterus   Cardiovascular: Normal rate.    Pulmonary/Chest: No respiratory distress.   intubated   Abdominal: Soft.   does not follow commands   Skin: Jaundice skin  Neurological Exam:   MS: [place], not following commands, globally aphasic.  Language: aphasic  Speech: No verbal output.  Orientation: unable to assess  CN: Pupils (CN III, IV, VI): RUMNVII  Motor*: Withdraws with LE and Limited movements with upper. Sensory: She had moved agains gravity with upper etremities. At least 3/5    Limited sensory assessment.    Stroke Team Times:   Date and Time Taken: 7/26/2017 2:00 AM    [place] Coma Scale:  Best Eye Response: 4 - spontaneous  Best Motor Response: 4 - withdraws from pain  Best Verbal Response: 1 - none  [place] Coma Scale Total: 9      Laboratory:  CMP:   Recent Labs  Lab 07/26/17 0645 07/26/17  1708   CALCIUM 9.3  --    ALBUMIN 2.7*  --    PROT 6.9  --     140   K 3.5  --    CO2 23  --      --    BUN 7  --    CREATININE 0.7  --    ALKPHOS 328*  --    ALT 63*  --    AST 41*  --    BILITOT 4.6*  --      BMP:   Recent Labs  Lab 07/26/17  0645 07/26/17  1708    140   K 3.5  --      --    CO2 23  --    BUN 7  --    CREATININE 0.7  --    CALCIUM 9.3  --      CBC:   Recent Labs  Lab 07/26/17  0645   WBC 10.56   RBC 4.19   HGB 13.1   HCT 38.9      MCV 93   MCH 31.3*   MCHC 33.7     Lipid Panel:   Recent Labs  Lab 07/26/17  1708   CHOL 245*   LDLCALC 172.6*   HDL 27*   TRIG 227*     Coagulation:   Recent Labs  Lab 07/26/17  0645   INR 1.0     Platelet Aggregation Study: No results for input(s): PLTAGG, PLTAGINTERP, PLTAGREGLACO, ADPPLTAGGREG in the last 168 hours.  Hgb A1C: No results for input(s): HGBA1C in the last 168 hours.  TSH: No results for input(s): TSH in the last 168 hours.    Diagnostic Results:  I have personally reviewed imaging and laboratory studies.  Findings: LDL elevated; WBC normalizing, LFTs normalizing.

## 2017-07-27 NOTE — PT/OT/SLP EVAL
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 No pertinent surgical history.    Has the patient been evaluated by SLP for swallowing? : Yes  Keep patient NPO?: Yes   General Precautions: Standard, aspiration, fall, NPO, seizure          Social Hx: Patient nonverbal. No family at bedside    Subjective:  Awake, nonverbal, unable to follow commands    Pain/Comfort  Pain Rating 1: 0/10  Pain Rating Post-Intervention 1: 0/10     Objective:        Oral Musculature Evaluation  Oral Musculature: unable to assess due to poor participation/comprehension  Dentition: teeth in poor condition, scattered dentition  Voice Prior to PO Intake: hoarse     Cognitive Status:  Behavioral Observations: alert-  Memory and Orientation: unable to assess pt nonverbal at this time  Attention: impaired    Language:unable to assess no vocalizations elicit throughout session    Auditory Comprehension: answers yes/no questions basic 2/6 max cues and able to follow commands 0% max cues    Verbal Expression: naming and automatic speech 0% accuracy and max cues    Motor Speech: TBA    Voice: TAB      Reading: TBA    Writing: TBA    Visual-Spatial: TBA      Additional Treatment:    Pt awake with eyes open and accepted PO trials willingly. Pt tolerated puree x3 and thin  liquids via straw x3 without overt s/s of aspiration. No overt s/s of aspiration noted; however pt unable to produce voice, volitional cough, or follow commands.  Recommend MBS to rule out aspiration and determine safest diet.     FIM:  Social Interaction: 1 Total Assistance--The patient interact appropriately les than 25% of the time, or not at all, and may need restraint due to socially inappropriate behaviors.         Comprehension: 1 Total Assistance--The patient understands direction and conversation about basic daily needs less than 25% of the time, or does no understand simple, commonly used spoken expressions (e.g hello, how are you) or gestures (e.g. waving good-bye, thank   Expression: 1 Total Assistance--The patient expresses basic daily needs and ideas less than 25% of the time, or does not express basic needs appropriately or consistently despite prompting.           Assessment:  Malina Robles is a 57 y.o. female with a SLP diagnosis of Aphasia and Dysphagia.          Discharge recommendations: Discharge Facility/Level Of Care Needs: nursing facility, basic, nursing facility, skilled     Goals:    SLP Goals        Problem: SLP Goal    Goal Priority Disciplines Outcome   SLP Goal     SLP    Description:  Speech Language Pathology Goals  Goals expected to be met by 8/2    1. Pt will participate in ongoing swallow evaluation  2. Pt will participate in speech language cognitive evaluation MET  3. Pt will follow 1 step commands with 40% accuracy and max cues  4. Pt will answer simple yes/no questions with 50% accuracy and max cues  5. Pt will vocalize x3 throughout session                           Plan:   Patient to be seen Therapy Frequency: 5 x/week   Plan of Care expires: 08/24/17  Plan of Care reviewed with: patient  SLP Follow-up?: Yes              Elyssa Brothers CCC-SLP   Speech Language Pathologist  Pager [phone]  07/27/2017

## 2017-07-27 NOTE — SUBJECTIVE & OBJECTIVE
Neurologic Chief Complaint: R temporal ICH    Subjective:     Interval History: Patient is seen for follow-up neurological assessment and treatment recommendations: NAEON, alert but not following commands, extubated    HPI, Past Medical, Family, and Social History remains the same as documented in the initial encounter.     Review of Systems   Constitutional: Negative for fever.   Respiratory: Negative for cough.    Cardiovascular: Negative for leg swelling.   Gastrointestinal: Negative for vomiting.   Skin: Positive for color change.   Neurological: Positive for speech difficulty. Negative for weakness.   Psychiatric/Behavioral: Negative for agitation.     Scheduled Meds:   amlodipine  10 mg Per NG tube Daily    aspirin  81 mg Per NG tube Daily    famotidine  20 mg Per NG tube BID    heparin (porcine)  5,000 Units Subcutaneous Q12H    lactulose  10 g Per NG tube TID    levothyroxine  25 mcg Per NG tube Before breakfast    lisinopril  10 mg Per NG tube Daily    piperacillin-tazobactam 4.5 g in dextrose 5 % 100 mL IVPB (ready to mix system)  4.5 g Intravenous Q8H    potassium chloride 10%  40 mEq Per NG tube Q4H    pravastatin  40 mg Oral QHS    sodium chloride 0.9%  10 mL Intravenous Q6H    sodium chloride 0.9%  3 mL Intravenous Q8H    sodium chloride  1 g Per NG tube TID     Continuous Infusions:   0/9% NACL & POTASSIUM CHLORIDE 20 MEQ/L 75 mL/hr at 07/27/17 1046    sodium chloride 0.9% 75 mL/hr at 07/26/17 0821     PRN Meds:fentaNYL, hydrALAZINE, labetalol, Flushing PICC Protocol **AND** sodium chloride 0.9% **AND** sodium chloride 0.9%    Objective:     Vital Signs (Most Recent):  Temp: 98.6 °F (37 °C) (07/27/17 0300)  Pulse: 87 (07/27/17 0300)  Resp: 18 (07/27/17 0300)  BP: (!) 161/85 (post administration) (07/27/17 0637)  SpO2: 96 % (07/27/17 0300)  BP Location: Left arm    Vital Signs Range (Last 24H):  Temp:  [97.8 °F (36.6 °C)-98.6 °F (37 °C)]   Pulse:  [70-87]   Resp:  [17-18]   BP:  (126-170)/(72-92)   SpO2:  [96 %-98 %]   BP Location: Left arm    Physical Exam   Constitutional: No distress.   Thin middle aged woman, resting in bed, no distress   HENT:   Head: Normocephalic and atraumatic.   Eyes: EOM are normal. Pupils are equal, round, and reactive to light.   Scleral icterus   Cardiovascular: Normal rate.    Pulmonary/Chest: Effort normal. No respiratory distress.   Abdominal: Soft.   Musculoskeletal: Normal range of motion. She exhibits no edema.   Neurological: She is alert. GCS eye subscore is 4. GCS verbal subscore is 5. GCS motor subscore is 6.   Global aphasia, not following commands   Skin: Skin is warm and dry.   Jaundice skin   Vitals reviewed.      Neurological Exam:   LOC: alert, not following commands  Language: aphasic  Speech: aphasic  Orientation: aphasic  Pupils (CN III, IV, VI): PERRL  Motor*: moves all extremities spontaneously, strength 4/5 in all 4 limbs; does not move to command.     NIH Stroke Scale:    Level of Consciousness: 0 - alert  LOC Questions: 2 - answers none correctly  LOC Commands: 2 - performs neither correctly  Best Gaze: 0 - normal  Visual: 0 - no visual loss  Facial Palsy: 0 - normal  Motor Left Arm: 0 - no drift  Motor Right Arm: 0 - no drift  Motor Left Le - drift  Motor Right Le - drift  Limb Ataxia: 0 - absent  Sensory: 0 - normal  Best Language: 3 - mute  Dysarthria: 2 - near to unintelligible  Extinction and Inattention: 0 - no neglect  NIH Stroke Scale Total: 11  [place] Coma Scale:  Best Eye Response: 4  Best Motor Response: 6  Best Verbal Response: 5        Laboratory:  CMP:     Recent Labs  Lab 17  0629   CALCIUM 8.7   ALBUMIN 2.5*   PROT 6.4     140  140   K 2.9*   CO2 24      BUN 10   CREATININE 0.9   ALKPHOS 276*   ALT 52*   AST 37   BILITOT 3.9*     CBC:     Recent Labs  Lab 17  0629   WBC 8.64   RBC 3.64*   HGB 11.3*   HCT 34.0*      MCV 93   MCH 31.0   MCHC 33.2     Lipid Panel:     Recent  Labs  Lab 07/26/17  1708   CHOL 245*   LDLCALC 172.6*   HDL 27*   TRIG 227*     Coagulation:     Recent Labs  Lab 07/27/17  0629   INR 1.0     Platelet Aggregation Study: No results for input(s): PLTAGG, PLTAGINTERP, PLTAGREGLACO, ADPPLTAGGREG in the last 168 hours.  Hgb A1C:   No results for input(s): HGBA1C in the last 168 hours.  TSH:   No results for input(s): TSH in the last 168 hours.    Diagnostic Results:  I have personally reviewed:   Findings:     CT Head. Date: 07/19/17  There are CT findings of a large, acute intracranial hemorrhage involving the medial right temporal lobe with evidence of mass effect as described above.    CTA Head/Neck. Date: 07/19/17  Acute right temporal intraparenchymal hemorrhage grossly stable to the prior exam with persistent edema and localized mass effect. No significant hydrocephalus or midline shift.    Scattered atherosclerosis of the bilateral ICAs resulting in 50% stenosis of the right cavernous ICA. No large vessel occlusion or aneurysm identified.    Age advanced senescent changes.    Remote right basal ganglia and left cerebral and cerebellar infarcts, unchanged from examination performed earlier today.    CT Head. Date: 07/24/17  No interval detrimental change when compared to CT dated 07/20/2017.    Unchanged right temporal lobe parenchymal hemorrhage with stable surrounding vasogenic edema and local mass effect. No midline shift.  No new intra-or extra-axial hemorrhage.    Stable chronic findings as above.

## 2017-07-27 NOTE — PROGRESS NOTES
[facility]  Vascular Neurology  Comprehensive Stroke Center  Progress Note    Assessment/Plan:     7/19: AMS this AM. CT head shows R temporal ICH with IVH. Transferred to [facility] ED and admitted to [facility]. Of note, patient's initial INR was 4.9.     07/20: remains intubated and on cardene, patient not sedated. See by GI for bile duct stone and plans to repeat ERCP. Patient with leukocytosis-cultures pending, on vanc/zosyn.   7/21/17 Remains intubated. CTA without abnormality to explain ICH.   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 Globally aphasic. Intermittent jaw movements. 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 Pending  EEG report. Pending ERCG and PEG.     Vasogenic cerebral edema    2/2 ICH  Evident on imaging        Essential hypertension    - Stroke risk factor.  - Management per primary team  - SBP<140 in setting of bleed        Elevated liver enzymes    AST/ALT improving   Management per primary team  INR 4.9 previously; now normal at 1.0        Jaundice    Patient with history of hep C and with current bile duct stone, had stent placed at OSH  GI following  AST 43 ALT 70  t bili 4.8  Will need repeat ERCP        * Nontraumatic intracerebral hemorrhage in hemisphere, unspecified    57 y.o. female PMH hepatitis C with transaminitis, CAD, thyroid disease, and h/o CVA on ASA, plavix. CT with R temporal ICH. CTA without evidence of etiology and ECHO EF 60, normal atrial size.      Antiplatelets: start ASA  Statins: None - hemorrhage - though consider statin as LDL is 342 but elevated LFTs  SBP < 140  Diagnostics: MRI brain with contrast in 1 month, Pending EEG  DVT ppx: TEDs,  SCDs  PT/OT and speech to evaluate and treat  Neuro checks qh            Neurologic Chief Complaint:   R temporal ICH    Subjective:     Interval History: Not following commands. Globally aphasic. Intermittent jaw movements. 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 Pending  EEG report. Pending ERCG and PEG.     HPI, Past Medical, Family, and Social History remains the same as documented in the initial encounter.     Review of Systems   Unable to perform ROS: Patient nonverbal     Scheduled Meds:   amlodipine  10 mg Per NG tube Daily    aspirin  81 mg Per NG tube Daily    famotidine  20 mg Per NG tube BID    heparin (porcine)  5,000 Units Subcutaneous Q12H    lactulose  10 g Per NG tube TID    levetiracetam in NaCl (iso-os)  500 mg Intravenous Q12H    levothyroxine  25 mcg Per NG tube Before breakfast    lisinopril  10 mg Per NG tube Daily    piperacillin-tazobactam 4.5 g in dextrose 5 % 100 mL IVPB (ready to mix system)  4.5 g Intravenous Q8H    sodium chloride 0.9%  10 mL Intravenous Q6H    sodium chloride 0.9%  3 mL Intravenous Q8H    sodium chloride  1 g Per NG tube TID     Continuous Infusions:   sodium chloride 0.9% 75 mL/hr at 07/26/17 0821     PRN Meds:fentaNYL, hydrALAZINE, labetalol, Flushing PICC Protocol **AND** sodium chloride 0.9% **AND** sodium chloride 0.9%    Objective:     Vital Signs (Most Recent):  Temp: 97.8 °F (36.6 °C) (07/26/17 1231)  Pulse: 85 (07/26/17 2300)  Resp: 17 (07/26/17 1231)  BP: (!) 146/84 (07/26/17 1231)  SpO2: 97 % (07/26/17 1231)  BP Location: Left arm    Vital Signs Range (Last 24H):  Temp:  [97.4 °F (36.3 °C)-98.1 °F (36.7 °C)]   Pulse:  [65-85]   Resp:  [16-20]   BP: (139-174)/(73-96)   SpO2:  [96 %-98 %]   BP Location: Left arm    Physical Exam   Neurological: GCS eye subscore is 4 - spontaneous. GCS verbal subscore is 1 - none. GCS motor subscore is 4 - withdraws from pain.      Constitutional: She appears well-developed and well-nourished. No distress.   HENT:   Head:  Normocephalic and atraumatic.   Eyes: EOM are normal. Pupils are equal, round, and reactive to light.   Scleral icterus   Cardiovascular: Normal rate.    Pulmonary/Chest: No respiratory distress.   intubated   Abdominal: Soft.   does not follow commands   Skin: Jaundice skin  Neurological Exam:   MS: [place], not following commands, globally aphasic.  Language: aphasic  Speech: No verbal output.  Orientation: unable to assess  CN: Pupils (CN III, IV, VI): RUMNVII  Motor*: Withdraws with LE and Limited movements with upper. Sensory: She had moved agains gravity with upper etremities. At least 3/5    Limited sensory assessment.    Stroke Team Times:   Date and Time Taken: 7/26/2017 2:00 AM    [place] Coma Scale:  Best Eye Response: 4 - spontaneous  Best Motor Response: 4 - withdraws from pain  Best Verbal Response: 1 - none  [place] Coma Scale Total: 9      Laboratory:  CMP:   Recent Labs  Lab 07/26/17  0645 07/26/17  1708   CALCIUM 9.3  --    ALBUMIN 2.7*  --    PROT 6.9  --     140   K 3.5  --    CO2 23  --      --    BUN 7  --    CREATININE 0.7  --    ALKPHOS 328*  --    ALT 63*  --    AST 41*  --    BILITOT 4.6*  --      BMP:   Recent Labs  Lab 07/26/17  0645 07/26/17  1708    140   K 3.5  --      --    CO2 23  --    BUN 7  --    CREATININE 0.7  --    CALCIUM 9.3  --      CBC:   Recent Labs  Lab 07/26/17  0645   WBC 10.56   RBC 4.19   HGB 13.1   HCT 38.9      MCV 93   MCH 31.3*   MCHC 33.7     Lipid Panel:   Recent Labs  Lab 07/26/17  1708   CHOL 245*   LDLCALC 172.6*   HDL 27*   TRIG 227*     Coagulation:   Recent Labs  Lab 07/26/17  0645   INR 1.0     Platelet Aggregation Study: No results for input(s): PLTAGG, PLTAGINTERP, PLTAGREGLACO, ADPPLTAGGREG in the last 168 hours.  Hgb A1C: No results for input(s): HGBA1C in the last 168 hours.  TSH: No results for input(s): TSH in the last 168 hours.    Diagnostic Results:  I have personally reviewed imaging and laboratory  studies.  Findings: LDL elevated; WBC normalizing, LFTs normalizing.    Active Problem List:   1.R-temporal  ICH spontaneus  2.HTN  3. Biliary obstruction. Pending ERCP. Prior biliary stent.  4. CAD  5. GERD  6. Hx thyroid disease.  7. Prior L-MCA stroke.     Assessment / Plan:  R/O vascular malformation related bleeding .  -Follow-up MRI with [facility]  in 1 month to R/O small vascular lesion on R-temporal lobe.  -ECASA 325mg qd.  -Repeat Lipid panel. Pending to start a statin.  -Reconsult GI ofor ERCP and PEG.  -Lisinopril 10mg qd for BP control.  -D/C Vanc.Keep Zosyn for now.  -Swallowing assessment.     Advance Directives and Disposition:    Full Code. Pending SNF transfer after GI issues resolved.        Uninterrupted level 3  Follow-up /Counseling Time (not including procedures):  = 35 min                         Enoch Colon MD, MPH, FAHA,  Neurocritical Care Department Faculty  Enoch Colon MD  Comprehensive Stroke Center  Department of Vascular Neurology   [facility]

## 2017-07-27 NOTE — PT/OT/SLP PROGRESS
"Physical Therapy  Treatment    Malina Robles   MRN: [id number]   Admitting Diagnosis: Nontraumatic intracerebral hemorrhage in hemisphere, unspecified    PT Received On: 07/20/17  PT Start Time: 1402     PT Stop Time: 1443    PT Total Time (min): 41 min       Billable Minutes:  Therapeutic Activity 13, Therapeutic Exercise 15 and Neuromuscular Re-education 13    Treatment Type: Treatment  PT/PTA: PTA     PTA Visit Number: 1       General Precautions: Standard, aspiration, fall, NPO, seizure  Orthopedic Precautions: N/A   Braces: N/A         Subjective:  Communicated with RN (Cruzito) prior to session.  Pt non-verbal during tx session.  Pt nods "yes" with head for everything.    Pain/Comfort  Pain Rating 1: 0/10  Pain Rating Post-Intervention 1: 0/10    Objective:   Patient found with: bed alarm, NG tube, pressure relief boots, peripheral IV, restraints, telemetry, PICC line (B UE restraints.  Pt found sup in bed with pads wet with urine)    Pt requires assistance for change of pads and hygiene 2* wet with urine      FUNCTIONAL MOBILITY    Bed Mobility (with vc's for sequencing and safe technique of functional mobility):        Rolling to the R with min A; Rolling to the L with mod A       Sup > sit at the EOB with mod A from R side lying        Sit > sup with mod A       Scooting hips to the EOB upon sitting with max A x2 scoots       Scooting hips along the EOB to the R requiring max A x3 scoots    Transfers       NA 2* pt with decreased trunk/head control while seated at the EOB      THERAPEUTIC EXERCISES         Pt receives PROM to R LE sup in bed (ankle DF, HSs, hip add/abd, hip IR/ER)        x10 reps with VCs and TCs         Pt performs L LE AAROM ther ex's sup in bed (APs, HSs, hip add/abd             hip IR/ER, SAQ's) x10 reps with VCs and TCs         R UE PROM ther ex's sitting at the EOB x10 reps with vc's      THERAPEUTIC ACTIVITIES     SITTING (13 min)       Pt tolerates sitting at the EOB with mod A for " trunk/head control and R UE support while in weight bearing       Position on mattress with vc's for postural control, weight shift and to attend forward.  Pt demonstrates flexed       Thoracic spine, rounded shoulders, forward head, downward gaze, PPT and forward lean    Education:  Education provided to pt regarding: bed mob, static sitting, postural control, weight shift.      Whiteboard updated with correct mobility information. RN/PCT notified.  Pt safe to sit at the EOB with therapy ONLY at this time.        AM-PAC 6 CLICK MOBILITY  How much help from another person does this patient currently need?   1 = Unable, Total/Dependent Assistance  2 = A lot, Maximum/Moderate Assistance  3 = A little, Minimum/Contact Guard/Supervision  4 = None, Modified [place]/Independent    Turning over in bed (including adjusting bedclothes, sheets and blankets)?: 2  Sitting down on and standing up from a chair with arms (e.g., wheelchair, bedside commode, etc.): 2  Moving from lying on back to sitting on the side of the bed?: 2  Moving to and from a bed to a chair (including a wheelchair)?: 2  Need to walk in hospital room?: 1  Climbing 3-5 steps with a railing?: 1  Total Score: 10    AM-PAC Raw Score CMS G-Code Modifier Level of Impairment Assistance   6 % Total / Unable   7 - 9 CM 80 - 100% Maximal Assist   10 - 14 CL 60 - 80% Moderate Assist   15 - 19 CK 40 - 60% Moderate Assist   20 - 22 CJ 20 - 40% Minimal Assist   23 CI 1-20% SBA / CGA   24 CH 0% Independent/ Mod I     Patient left supine with all lines intact, call button in reach, bed alarm on, restraints reapplied at end of session and RN notified.    Assessment:  Malina Robles is a 57 y.o. female with a medical diagnosis of Nontraumatic intracerebral hemorrhage in hemisphere, unspecified and presents with R HP, impaired balance, cognitive deficits requiring significant assistance for bed mob and static sitting balance.  Pt unable to follow commands or  mimic after modeling. Pt will cont to benefit from skilled PT intervention to address deficits and improve functional mobility.     Rehab identified problem list/impairments: Rehab identified problem list/impairments: weakness, impaired endurance, impaired sensation, impaired self care skills, impaired functional mobilty, impaired balance, impaired cognition, decreased coordination, decreased upper extremity function, decreased lower extremity function, decreased safety awareness, abnormal tone, decreased ROM, impaired coordination, impaired fine motor, impaired skin    Rehab potential is fair.    Activity tolerance: Fair    Discharge recommendations: Discharge Facility/Level Of Care Needs: nursing facility, basic, nursing facility, skilled     Barriers to discharge: Barriers to Discharge: None    Equipment recommendations: Equipment Needed After Discharge: none     GOALS:    Physical Therapy Goals        Problem: Physical Therapy Goal    Goal Priority Disciplines Outcome Goal Variances Interventions   Physical Therapy Goal     PT/OT, PT Ongoing (interventions implemented as appropriate)     Description:  Goals to be met by: 8/3/2017     Patient will increase functional independence with mobility by performin. Pt will participate in bilateral UE and LE ROM exercises on 3 consecutive visits with no change in vitals.   2. Pt will perform rolling R and L with moderate assistance   3. Supine to sit with moderate assistance   4. Sit to supine with moderate assistance   5.  Pt will tolerate sitting EOB x 10 minutes with moderate assistance                        PLAN:    Patient to be seen 4 x/week  to address the above listed problems via therapeutic activities, therapeutic exercises, neuromuscular re-education  Plan of Care expires: 17  Plan of Care reviewed with: patient         Swapna HERRMANN Ryan, PTA  2017

## 2017-07-27 NOTE — PROCEDURES
DATE OF PROCEDURE:  07/25/2017    EEG NUMBER:  [id number]    REFERRING PHYSICIAN:  Varinder Olivas M.D.    ELECTROENCEPHALOGRAM REPORT    METHODOLOGY:  Electroencephalographic (EEG) recording is recorded with   electrodes placed according to the International 10-20 placement system.  Thirty   two (32) channels of digital signal (sampling rate of 512/sec), including T1   and T2, were simultaneously recorded from the scalp and may include EKG, EMG,   and/or eye monitors.  Recording band pass was 0.1 to 512 Hz.  Digital video   recording of the patient is simultaneously recorded with the EEG.  The patient   is instructed to report clinical symptoms which may occur during the recording   session.  EEG and video recording are stored and archived in digital format.    Activation procedures, which include photic stimulation, hyperventilation and   instructing patients to perform simple tasks, are done in selected patients.    The EEG is displayed on a monitor screen and can be reviewed using different   montages.  Computer assisted-analysis is employed to detect spike and   electrographic seizure activity.  The entire record is submitted for computer   analysis.  The entire recording is visually reviewed, and the times identified   by computer analysis as being spikes or seizures are reviewed again.    Compressed spectral analysis (CSA) is also performed on the activity recorded   from each individual channel.  This is displayed as a power display of   frequencies from 0 to 30 Hz over time.  The CSA is reviewed looking for   asymmetries in power between homologous areas of the scalp, then compared with   the original EEG recording.    Surface Tension software was also utilized in the review of this study.  This software   suite analyzes the EEG recording in multiple domains.  Coherence and rhythmicity   are computed to identify EEG sections which may contain organized seizures.    Each channel undergoes analysis to detect the  presence of spike and sharp waves   which have special and morphological characteristics of epileptic activity.  The   routine EEG recording is converted from special into frequency domain.  This is   then displayed comparing homologous areas to identify areas of significant   asymmetry.  Algorithm to identify non-cortically generated artifact is used to   separate artifact from the EEG.    RECORDING TIMES:  Start on 07/25/2017 at hour 13, minute 19, second 31.  End on 07/25/2017 at hour 14, minute 41, second 43.  The total time of video EEG recording for this study was 1 hour 22 minutes.    EEG FINDINGS:  The recording was obtained with a number of standard bipolar and   referential montages during wakefulness and drowsiness.  In the alert state, the   background was diffusely disorganized with a nonsustained posterior dominant   rhythm of 7 to 8 Hz, which was symmetric.  During drowsiness, the background   rhythm waxed and waned and there were periods of slowing.  The background was   punctuated by excessive muscle artifacts related to dyskinesia.  Focal   suppression and slowing of the background was noted in the right frontotemporal   as well as temporoparietal regions.  There were no interictal epileptiform   abnormalities and no clinical or electrographic seizures were recorded.    The EKG channel revealed sinus rhythm.    IMPRESSION:  This is an abnormal EEG during wakefulness and drowsiness.  Mild   slowing of the background was noted.  Focal suppression and slowing was noted in   the right frontotemporal and temporoparietal regions.    CLINICAL CORRELATION:  The patient is a 57-year-old female with a history of   intraparenchymal hemorrhage who is currently maintained on Keppra.  This is an   abnormal EEG during wakefulness and drowsiness.  The overall degree of slowing   and disorganization is suggestive of a mild encephalopathy, nonspecific to the   cause.  The presence of right frontotemporal and  temporoparietal focal slowing   is suggestive of an area of focal neuronal dysfunction and correlates with a   history of right hemispheric intraparenchymal hemorrhage.  There is no evidence   of an epileptic process on this recording.  No seizures were recorded during   this study.  Excessive movement and muscle artifact related to dyskinesias of   the face were noted.      FAK/JODY  dd: 07/27/2017 10:24:21 (CDT)  td: 07/27/2017 10:39:16 (CDT)  Doc ID   #[id number]  Job ID #[id number]    CC:

## 2017-07-27 NOTE — ASSESSMENT & PLAN NOTE
- Temporary stent in place, biliary system decompressed with improved hepatic function  - ERCP per GI early next week

## 2017-07-27 NOTE — PLAN OF CARE
Problem: Physical Therapy Goal  Goal: Physical Therapy Goal  Goals to be met by: 8/3/2017     Patient will increase functional independence with mobility by performin. Pt will participate in bilateral UE and LE ROM exercises on 3 consecutive visits with no change in vitals.   2. Pt will perform rolling R and L with moderate assistance   3. Supine to sit with moderate assistance   4. Sit to supine with moderate assistance   5.  Pt will tolerate sitting EOB x 10 minutes with moderate assistance             Goals remain appropriate.     Swapna Davis, LILLIAN.  2017

## 2017-07-27 NOTE — PLAN OF CARE
"Problem: Patient Care Overview  Goal: Plan of Care Review  POC reviewed with pt, no evidence of understanding. Pt alert through most of the shift, intermittently drowsy but easily arousable. Pt oriented to self, able to verbalize her name is "Mariam" when asked. Pt otherwise minimally verbal through the shift, intermittently following commands. RISA. Pt on tele, NSR, on RA. Pt NPO, with NGT to R nare, tube pulled out to ~ 53 cm per MD instructions this AM, now OK to use per MD order, flushing without issue. CBGs Q6. Pt incontinent of urine, LBM 7/25. RUE dual lumen PICC, IVF infusing without issue. Q2 turns, heels elevated in heel boots, pressure points protected. Pt in bilateral soft wrist restraints, pt occasionally noted to be restless and reaches for NGT when out of restraints. Pt currently resting in bed, reoriented to call bell, call bell within reach, wrist restraints in place, bed alarm on, will continue to monitor.      "

## 2017-07-27 NOTE — ASSESSMENT & PLAN NOTE
- Stroke risk factor  - SBP<140 in setting of bleed  - Elevated BP overnight to 170s systolic  - Increasing Lisinopril 20 mg daily

## 2017-07-28 PROBLEM — D72.829 LEUKOCYTOSIS: Status: RESOLVED | Noted: 2017-07-28 | Resolved: 2017-07-27

## 2017-07-28 PROBLEM — E87.6 HYPOKALEMIA: Status: RESOLVED | Noted: 2017-07-28 | Resolved: 2017-07-28

## 2017-07-28 PROBLEM — K80.01 CALCULUS OF GALLBLADDER WITH ACUTE CHOLECYSTITIS AND OBSTRUCTION: Status: ACTIVE | Noted: 2017-07-28

## 2017-07-28 PROBLEM — E87.6 HYPOKALEMIA: Status: RESOLVED | Noted: 2017-07-27 | Resolved: 2017-07-28

## 2017-07-28 LAB
ALBUMIN SERPL BCP-MCNC: 2.7 G/DL
ALP SERPL-CCNC: 256 U/L
ALT SERPL W/O P-5'-P-CCNC: 47 U/L
AMMONIA PLAS-SCNC: 32 UMOL/L
AMYLASE SERPL-CCNC: 53 U/L
ANION GAP SERPL CALC-SCNC: 7 MMOL/L
AST SERPL-CCNC: 41 U/L
BASOPHILS # BLD AUTO: 0.08 K/UL
BASOPHILS NFR BLD: 1 %
BILIRUB SERPL-MCNC: 3.8 MG/DL
BUN SERPL-MCNC: 10 MG/DL
CALCIUM SERPL-MCNC: 8.7 MG/DL
CHLORIDE SERPL-SCNC: 114 MMOL/L
CO2 SERPL-SCNC: 19 MMOL/L
CREAT SERPL-MCNC: 0.9 MG/DL
DIFFERENTIAL METHOD: ABNORMAL
EOSINOPHIL # BLD AUTO: 0.3 K/UL
EOSINOPHIL NFR BLD: 4.1 %
ERYTHROCYTE [DISTWIDTH] IN BLOOD BY AUTOMATED COUNT: 16.1 %
EST. GFR  (AFRICAN AMERICAN): >60 ML/MIN/1.73 M^2
EST. GFR  (NON AFRICAN AMERICAN): >60 ML/MIN/1.73 M^2
GLUCOSE SERPL-MCNC: 101 MG/DL
HCT VFR BLD AUTO: 34.1 %
HGB BLD-MCNC: 11.3 G/DL
INR PPP: 1
LIPASE SERPL-CCNC: 99 U/L
LYMPHOCYTES # BLD AUTO: 2.5 K/UL
LYMPHOCYTES NFR BLD: 31.2 %
MAGNESIUM SERPL-MCNC: 1.9 MG/DL
MCH RBC QN AUTO: 30.9 PG
MCHC RBC AUTO-ENTMCNC: 33.1 G/DL
MCV RBC AUTO: 93 FL
MONOCYTES # BLD AUTO: 0.8 K/UL
MONOCYTES NFR BLD: 9.7 %
NEUTROPHILS # BLD AUTO: 4.2 K/UL
NEUTROPHILS NFR BLD: 53.7 %
PHOSPHATE SERPL-MCNC: 3.6 MG/DL
PLATELET # BLD AUTO: 146 K/UL
PMV BLD AUTO: 9.8 FL
POCT GLUCOSE: 101 MG/DL (ref 70–110)
POCT GLUCOSE: 105 MG/DL (ref 70–110)
POCT GLUCOSE: 123 MG/DL (ref 70–110)
POTASSIUM SERPL-SCNC: 5 MMOL/L
PROT SERPL-MCNC: 6.6 G/DL
PROTHROMBIN TIME: 10.5 SEC
RBC # BLD AUTO: 3.66 M/UL
SODIUM SERPL-SCNC: 140 MMOL/L
WBC # BLD AUTO: 7.85 K/UL

## 2017-07-28 PROCEDURE — 20600001 HC STEP DOWN PRIVATE ROOM

## 2017-07-28 PROCEDURE — 25000003 PHARM REV CODE 250: Performed by: STUDENT IN AN ORGANIZED HEALTH CARE EDUCATION/TRAINING PROGRAM

## 2017-07-28 PROCEDURE — 95813 EEG EXTND MNTR 61-119 MIN: CPT | Mod: 26,,, | Performed by: PSYCHIATRY & NEUROLOGY

## 2017-07-28 PROCEDURE — 63600175 PHARM REV CODE 636 W HCPCS: Performed by: NURSE PRACTITIONER

## 2017-07-28 PROCEDURE — 85610 PROTHROMBIN TIME: CPT

## 2017-07-28 PROCEDURE — 36415 COLL VENOUS BLD VENIPUNCTURE: CPT

## 2017-07-28 PROCEDURE — 85025 COMPLETE CBC W/AUTO DIFF WBC: CPT

## 2017-07-28 PROCEDURE — 97168 OT RE-EVAL EST PLAN CARE: CPT

## 2017-07-28 PROCEDURE — 25000003 PHARM REV CODE 250: Performed by: PSYCHIATRY & NEUROLOGY

## 2017-07-28 PROCEDURE — 63600175 PHARM REV CODE 636 W HCPCS: Performed by: STUDENT IN AN ORGANIZED HEALTH CARE EDUCATION/TRAINING PROGRAM

## 2017-07-28 PROCEDURE — 25000003 PHARM REV CODE 250: Performed by: PHYSICIAN ASSISTANT

## 2017-07-28 PROCEDURE — 82150 ASSAY OF AMYLASE: CPT

## 2017-07-28 PROCEDURE — 63600175 PHARM REV CODE 636 W HCPCS: Performed by: PSYCHIATRY & NEUROLOGY

## 2017-07-28 PROCEDURE — A4216 STERILE WATER/SALINE, 10 ML: HCPCS | Performed by: PHYSICIAN ASSISTANT

## 2017-07-28 PROCEDURE — 83735 ASSAY OF MAGNESIUM: CPT

## 2017-07-28 PROCEDURE — 97110 THERAPEUTIC EXERCISES: CPT

## 2017-07-28 PROCEDURE — 84100 ASSAY OF PHOSPHORUS: CPT

## 2017-07-28 PROCEDURE — 25000003 PHARM REV CODE 250: Performed by: NURSE PRACTITIONER

## 2017-07-28 PROCEDURE — 99233 SBSQ HOSP IP/OBS HIGH 50: CPT | Mod: ,,, | Performed by: PSYCHIATRY & NEUROLOGY

## 2017-07-28 PROCEDURE — 82140 ASSAY OF AMMONIA: CPT

## 2017-07-28 PROCEDURE — A4216 STERILE WATER/SALINE, 10 ML: HCPCS | Performed by: NURSE PRACTITIONER

## 2017-07-28 PROCEDURE — 83690 ASSAY OF LIPASE: CPT

## 2017-07-28 PROCEDURE — 80053 COMPREHEN METABOLIC PANEL: CPT

## 2017-07-28 PROCEDURE — 95813 EEG EXTND MNTR 61-119 MIN: CPT

## 2017-07-28 PROCEDURE — 25000003 PHARM REV CODE 250: Performed by: HOSPITALIST

## 2017-07-28 RX ORDER — MIDAZOLAM HYDROCHLORIDE 1 MG/ML
1 INJECTION INTRAMUSCULAR; INTRAVENOUS ONCE
Status: COMPLETED | OUTPATIENT
Start: 2017-07-28 | End: 2017-07-28

## 2017-07-28 RX ORDER — LISINOPRIL 20 MG/1
40 TABLET ORAL DAILY
Status: DISCONTINUED | OUTPATIENT
Start: 2017-07-29 | End: 2017-07-28

## 2017-07-28 RX ORDER — LORAZEPAM 0.5 MG/1
0.5 TABLET ORAL
Status: DISCONTINUED | OUTPATIENT
Start: 2017-07-28 | End: 2017-07-28

## 2017-07-28 RX ORDER — NAPROXEN SODIUM 220 MG/1
81 TABLET, FILM COATED ORAL DAILY
Status: DISCONTINUED | OUTPATIENT
Start: 2017-07-29 | End: 2017-07-31 | Stop reason: HOSPADM

## 2017-07-28 RX ORDER — LORAZEPAM 0.5 MG/1
0.5 TABLET ORAL
Status: DISCONTINUED | OUTPATIENT
Start: 2017-07-28 | End: 2017-07-31 | Stop reason: HOSPADM

## 2017-07-28 RX ORDER — AMLODIPINE BESYLATE 10 MG/1
10 TABLET ORAL DAILY
Status: DISCONTINUED | OUTPATIENT
Start: 2017-07-29 | End: 2017-07-31 | Stop reason: HOSPADM

## 2017-07-28 RX ORDER — LACTULOSE 10 G/15ML
10 SOLUTION ORAL 3 TIMES DAILY
Status: DISCONTINUED | OUTPATIENT
Start: 2017-07-28 | End: 2017-07-31 | Stop reason: HOSPADM

## 2017-07-28 RX ORDER — FAMOTIDINE 20 MG/1
20 TABLET, FILM COATED ORAL 2 TIMES DAILY
Status: DISCONTINUED | OUTPATIENT
Start: 2017-07-28 | End: 2017-07-31 | Stop reason: HOSPADM

## 2017-07-28 RX ORDER — LISINOPRIL 20 MG/1
40 TABLET ORAL DAILY
Status: DISCONTINUED | OUTPATIENT
Start: 2017-07-29 | End: 2017-07-31 | Stop reason: HOSPADM

## 2017-07-28 RX ORDER — LEVOTHYROXINE SODIUM 25 UG/1
25 TABLET ORAL
Status: DISCONTINUED | OUTPATIENT
Start: 2017-07-29 | End: 2017-07-31 | Stop reason: HOSPADM

## 2017-07-28 RX ADMIN — HEPARIN SODIUM 5000 UNITS: 5000 INJECTION, SOLUTION INTRAVENOUS; SUBCUTANEOUS at 09:07

## 2017-07-28 RX ADMIN — SODIUM CHLORIDE TAB 1 GM 1 G: 1 TAB at 01:07

## 2017-07-28 RX ADMIN — HEPARIN SODIUM 5000 UNITS: 5000 INJECTION, SOLUTION INTRAVENOUS; SUBCUTANEOUS at 10:07

## 2017-07-28 RX ADMIN — Medication 3 ML: at 01:07

## 2017-07-28 RX ADMIN — LORAZEPAM 0.5 MG: 0.5 TABLET ORAL at 01:07

## 2017-07-28 RX ADMIN — Medication 10 ML: at 05:07

## 2017-07-28 RX ADMIN — AMLODIPINE BESYLATE 10 MG: 10 TABLET ORAL at 10:07

## 2017-07-28 RX ADMIN — LACTULOSE 10 G: 20 SOLUTION ORAL at 01:07

## 2017-07-28 RX ADMIN — MIDAZOLAM HYDROCHLORIDE 1 MG: 1 INJECTION, SOLUTION INTRAMUSCULAR; INTRAVENOUS at 06:07

## 2017-07-28 RX ADMIN — SODIUM CHLORIDE TAB 1 GM 1 G: 1 TAB at 05:07

## 2017-07-28 RX ADMIN — LORAZEPAM 0.5 MG: 0.5 TABLET ORAL at 05:07

## 2017-07-28 RX ADMIN — Medication 3 ML: at 05:07

## 2017-07-28 RX ADMIN — LACTULOSE 10 G: 20 SOLUTION ORAL at 09:07

## 2017-07-28 RX ADMIN — PIPERACILLIN AND TAZOBACTAM 4.5 G: 4; .5 INJECTION, POWDER, LYOPHILIZED, FOR SOLUTION INTRAVENOUS; PARENTERAL at 10:07

## 2017-07-28 RX ADMIN — PIPERACILLIN AND TAZOBACTAM 4.5 G: 4; .5 INJECTION, POWDER, LYOPHILIZED, FOR SOLUTION INTRAVENOUS; PARENTERAL at 09:07

## 2017-07-28 RX ADMIN — Medication 3 ML: at 09:07

## 2017-07-28 RX ADMIN — FAMOTIDINE 20 MG: 20 TABLET, FILM COATED ORAL at 05:07

## 2017-07-28 RX ADMIN — ASPIRIN 81 MG CHEWABLE TABLET 81 MG: 81 TABLET CHEWABLE at 10:07

## 2017-07-28 RX ADMIN — FAMOTIDINE 20 MG: 20 TABLET, FILM COATED ORAL at 10:07

## 2017-07-28 RX ADMIN — SODIUM CHLORIDE: 0.9 INJECTION, SOLUTION INTRAVENOUS at 10:07

## 2017-07-28 RX ADMIN — Medication 10 ML: at 01:07

## 2017-07-28 RX ADMIN — SODIUM CHLORIDE TAB 1 GM 1 G: 1 TAB at 09:07

## 2017-07-28 RX ADMIN — PRAVASTATIN SODIUM 40 MG: 20 TABLET ORAL at 09:07

## 2017-07-28 RX ADMIN — Medication 10 ML: at 12:07

## 2017-07-28 RX ADMIN — HYDRALAZINE HYDROCHLORIDE 10 MG: 20 INJECTION INTRAMUSCULAR; INTRAVENOUS at 12:07

## 2017-07-28 RX ADMIN — LEVOTHYROXINE SODIUM 25 MCG: 25 TABLET ORAL at 05:07

## 2017-07-28 RX ADMIN — LACTULOSE 10 G: 20 SOLUTION ORAL at 05:07

## 2017-07-28 NOTE — PT/OT/SLP PROGRESS
Physical Therapy  Pt Not Seen       Malina Robles  MRN: [id number]    Patient not seen today secondary to Unavailable (Comment) (Pt OFT this pm x2 attempts 2* MBSS and MRI). Will follow-up  on next scheduled visit.    Swapna Davis, PTA   7/28/2017

## 2017-07-28 NOTE — MEDICAL/APP STUDENT
[facility] Hospital Medicine  Progress Note    Patient Name: Malina Robles  MRN: [id number]  Patient Class: IP- Inpatient   Admission Date: 7/19/2017  Length of Stay: 8 days  Attending Physician: Jun Hernandez MD  Primary Care Provider: Jose Rios MD    [facility] Medicine Team: Networked reference to record PCT  Drake Grady    Subjective:     Principal Problem:Nontraumatic intracerebral hemorrhage in hemisphere, unspecified    HPI: 57 year old female with a past medical history of CVA, Asthma, CAD, depression, emphysema, GERD< HTN and hypothyroidism [place] [facility] [place] [facility] after found altered with jaundice. At that time PT-INR 4.9, , , ammonia <9. CT abdomen showed a 2 cm obstructing  Stone in the distal common bile duct. 3cm AAA. Also found to have UTI at that time. 7/18 ERCP was attempted-stone retrieval unsuccessful.   [place] [facility] [place] level of care.     Hospital Course: Transferred to ICU.  Extubated,  CT head shows right temporal intraparenchymal hemorrhage.  EEG showed no epilepsy or seizure activity , slowing of right frontotemporal/tempoparietal area. ERCP? PICC line placed.    Interval History: No acute events over night    Review of Systems   Reason unable to perform ROS: Altered Mental Status.     Objective:     Vital Signs (Most Recent):  Temp: 98 °F (36.7 °C) (07/28/17 0430)  Pulse: 80 (07/28/17 0430)  Resp: 16 (07/28/17 0430)  BP: (!) 155/83 (07/28/17 0430)  SpO2: 97 % (07/28/17 0430) Vital Signs (24h Range):  Temp:  [97.7 °F (36.5 °C)-99.4 °F (37.4 °C)] 98 °F (36.7 °C)  Pulse:  [77-96] 80  Resp:  [16-18] 16  SpO2:  [96 %-99 %] 97 %  BP: (127-184)/(69-98) 155/83     Weight: 50.1 kg (110 lb 7.2 oz)  Body mass index is 18.96 kg/m².  No intake or output data in the 24 hours ending 07/28/17 0830   Physical Exam   HENT:   Head: Normocephalic and atraumatic.   Eyes: Pupils are equal, round, and reactive to light.    Cardiovascular: Normal rate, regular rhythm and normal heart sounds.    Pulmonary/Chest: Effort normal and breath sounds normal.   Abdominal: Soft. Bowel sounds are normal.   Neurological: GCS eye subscore is 1. GCS verbal subscore is 2. GCS motor subscore is 6.   Reflex Scores:       Tricep reflexes are 2+ on the right side and 2+ on the left side.       Bicep reflexes are 2+ on the right side and 2+ on the left side.       Brachioradialis reflexes are 2+ on the right side and 2+ on the left side.       Patellar reflexes are 2+ on the right side and 2+ on the left side.  Altered. Unable to assess. Patient sleeping, unable to wake.   PEERL   Skin: Skin is warm and dry. Capillary refill takes less than 2 seconds.       Significant Labs:  Recent Results (from the past 24 hour(s))   POCT glucose    Collection Time: 07/27/17  3:12 PM   Result Value Ref Range    POCT Glucose 92 70 - 110 mg/dL   POCT glucose    Collection Time: 07/28/17  1:23 AM   Result Value Ref Range    POCT Glucose 101 70 - 110 mg/dL   Sodium    Collection Time: 07/28/17  6:18 AM   Result Value Ref Range    Sodium 140 136 - 145 mmol/L   Sodium    Collection Time: 07/28/17  6:18 AM   Result Value Ref Range    Sodium 140 136 - 145 mmol/L   Amylase    Collection Time: 07/28/17  6:18 AM   Result Value Ref Range    Amylase 53 20 - 110 U/L   CBC auto differential    Collection Time: 07/28/17  6:18 AM   Result Value Ref Range    WBC 7.85 3.90 - 12.70 K/uL    RBC 3.66 (L) 4.00 - 5.40 M/uL    Hemoglobin 11.3 (L) 12.0 - 16.0 g/dL    Hematocrit 34.1 (L) 37.0 - 48.5 %    MCV 93 82 - 98 fL    MCH 30.9 27.0 - 31.0 pg    MCHC 33.1 32.0 - 36.0 g/dL    RDW 16.1 (H) 11.5 - 14.5 %    Platelets 146 (L) 150 - 350 K/uL    MPV 9.8 9.2 - 12.9 fL    Gran # 4.2 1.8 - 7.7 K/uL    Lymph # 2.5 1.0 - 4.8 K/uL    Mono # 0.8 0.3 - 1.0 K/uL    Eos # 0.3 0.0 - 0.5 K/uL    Baso # 0.08 0.00 - 0.20 K/uL    Gran% 53.7 38.0 - 73.0 %    Lymph% 31.2 18.0 - 48.0 %    Mono% 9.7 4.0 - 15.0 %     Eosinophil% 4.1 0.0 - 8.0 %    Basophil% 1.0 0.0 - 1.9 %    Differential Method Automated    Ammonia    Collection Time: 07/28/17  6:18 AM   Result Value Ref Range    Ammonia 32 10 - 50 umol/L   Comprehensive metabolic panel    Collection Time: 07/28/17  6:18 AM   Result Value Ref Range    Sodium 140 136 - 145 mmol/L    Potassium 5.0 3.5 - 5.1 mmol/L    Chloride 114 (H) 95 - 110 mmol/L    CO2 19 (L) 23 - 29 mmol/L    Glucose 101 70 - 110 mg/dL    BUN, Bld 10 6 - 20 mg/dL    Creatinine 0.9 0.5 - 1.4 mg/dL    Calcium 8.7 8.7 - 10.5 mg/dL    Total Protein 6.6 6.0 - 8.4 g/dL    Albumin 2.7 (L) 3.5 - 5.2 g/dL    Total Bilirubin 3.8 (H) 0.1 - 1.0 mg/dL    Alkaline Phosphatase 256 (H) 55 - 135 U/L    AST 41 (H) 10 - 40 U/L    ALT 47 (H) 10 - 44 U/L    Anion Gap 7 (L) 8 - 16 mmol/L    eGFR if African American >60.0 >60 mL/min/1.73 m^2    eGFR if non African American >60.0 >60 mL/min/1.73 m^2   Lipase    Collection Time: 07/28/17  6:18 AM   Result Value Ref Range    Lipase 99 (H) 4 - 60 U/L   Magnesium    Collection Time: 07/28/17  6:18 AM   Result Value Ref Range    Magnesium 1.9 1.6 - 2.6 mg/dL   Phosphorus    Collection Time: 07/28/17  6:18 AM   Result Value Ref Range    Phosphorus 3.6 2.7 - 4.5 mg/dL   Protime-INR    Collection Time: 07/28/17  6:18 AM   Result Value Ref Range    Prothrombin Time 10.5 9.0 - 12.5 sec    INR 1.0 0.8 - 1.2         Significant Imaging: I have reviewed and interpreted all pertinent imaging results/findings within the past 24 hours.    Assessment/Plan:      Active Diagnoses:    Diagnosis Date Noted POA    PRINCIPAL PROBLEM:  Nontraumatic intracerebral hemorrhage in hemisphere, unspecified [I61.2] 07/19/2017 Yes    Hypokalemia [E87.6] 07/27/2017 Unknown    Encounter for intubation [Z01.818]  Not Applicable    Calculus of gallbladder with acute cholecystitis and obstruction [K80.01]  Unknown    Respiratory alkalosis [E87.3] 07/22/2017 Unknown    Acute blood loss anemia [D62] 07/22/2017  No    Vasogenic cerebral edema [G93.6] 07/21/2017 Yes    Right-sided nontraumatic intracerebral hemorrhage [I61.9] 07/19/2017 Yes    Hypothyroid [E03.9] 07/18/2017 Yes    E-coli UTI [N39.0, B96.20] 07/14/2017 Yes    Essential hypertension [I10] 07/13/2017 Yes    Calculus of bile duct without mention of cholecystitis, with obstruction [K80.51] 07/13/2017 Yes    Cholelithiasis [K80.20] 07/13/2017 Yes    Elevated liver enzymes [R74.8] 07/13/2017 Yes    Jaundice [R17] 07/12/2017 Yes      Problems Resolved During this Admission:    Diagnosis Date Noted Date Resolved POA    Chronic hepatitis C with hepatic coma [B18.2] 07/19/2017 07/27/2017 Yes    Leukocytosis [D72.829] 07/19/2017 07/27/2017 Unknown    Hepatic coma/encephalopathy [K72.91] 07/14/2017 07/27/2017 Yes   1. Hypokalemia  Yesterday lab at 2.9, today at 5.0   Continue IVF, monitor closely.  If continuing to rise, d/c extra K in fluids, increase IVF.    2. Right Temporal ICH  Follow MRI      3. Obstructing stone  Pending ERCP per GI next week  AST/ALT/T Bili improving.   Follow closely  VTE Risk Mitigation         Ordered     heparin (porcine) injection 5,000 Units  Every 12 hours     Route:  Subcutaneous        07/21/17 1537     High Risk of VTE  Once      07/19/17 1424     Reason for No Pharmacological VTE Prophylaxis  Once      07/19/17 1424     Place sequential compression device  Until discontinued      07/19/17 1424     Place WOJCIECH hose  Until discontinued      07/19/17 1424          Drake Grady  Department of Hospital Medicine   [facility]

## 2017-07-28 NOTE — ASSESSMENT & PLAN NOTE
57 y.o. female PMH hepatitis C with transaminitis, CAD, thyroid disease, and h/o CVA on ASA, plavix. CT with R temporal ICH. CTA without evidence of etiology and ECHO EF 60, normal atrial size.      Antiplatelets: start ASA  Statins: Repeat LDL 170s; Pravastatin 40 mg; monitor LFTs  SBP < 140  Diagnostics: MRI brain with contrast to assess for mass, MRI brain with contrast in 1 month, Pending EEG  DVT ppx: TEDs, SCDs  PT/OT and speech to evaluate and treat  Neuro checks qh

## 2017-07-28 NOTE — PLAN OF CARE
Problem: Occupational Therapy Goal  Goal: Occupational Therapy Goal  Goals to be met by: 8/11/17     Patient will increase functional independence with ADLs by performing:    UE Dressing with Maximum Assistance.  LE Dressing with Maximum Assistance.  Grooming while seated with Moderate Assistance.  Toileting from bedside commode with Maximum Assistance for hygiene and clothing management.   Sitting at edge of bed x15 minutes with Contact Guard Assistance.  Rolling to Bilateral with Minimal Assistance.   Supine to sit with Moderate Assistance.  Stand pivot transfers with Max Assistance.  Pt will follow 3/4 one step commands.    Outcome: Ongoing (interventions implemented as appropriate)  Goals updated.  OT re-eval completed.

## 2017-07-28 NOTE — NURSING TRANSFER
Nursing Transfer Note      7/28/2017     Transfer to MRI    Transfer via bed      Transfer with tele     Transported by pt transport

## 2017-07-28 NOTE — PROGRESS NOTES
[facility]  Vascular Neurology  Comprehensive Stroke Center  Progress Note    Assessment/Plan:     7/19: AMS this AM. CT head shows R temporal ICH with IVH. Transferred to [facility] ED and admitted to [facility]. Of note, patient's initial INR was 4.9.     07/20: remains intubated and on cardene, patient not sedated. See by GI for bile duct stone and plans to repeat ERCP. Patient with leukocytosis-cultures pending, on vanc/zosyn.   7/21/17 Remains intubated. CTA without abnormality to explain ICH.   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 Globally aphasic. Intermittent jaw movements. 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 Pending  EEG report. Pending ERCG and PEG.   7/27/17: neuro exam stable, still not following commands; d/c keppra, repeat EEG/MBSS tomorrow; ERCP next week  7/28 - continues to be difficult to arouse in the mornings, but is more awake around noon, ordered MRI brain to assess for mass    * Nontraumatic intracerebral hemorrhage in hemisphere, unspecified    57 y.o. female PMH hepatitis C with transaminitis, CAD, thyroid disease, and h/o CVA on ASA, plavix. CT with R temporal ICH. CTA without evidence of etiology and ECHO EF 60, normal atrial size.      Antiplatelets: start ASA  Statins: Repeat LDL 170s; Pravastatin 40 mg; monitor LFTs  SBP < 140  Diagnostics: MRI brain with contrast to assess for mass, MRI brain with contrast in 1 month, Pending EEG  DVT ppx: TEDs, SCDs  PT/OT and speech to evaluate and treat  Neuro checks qh                            Calculus of bile duct without mention of cholecystitis, with obstruction    - Temporary stent in place, biliary system  decompressed with improved hepatic function  - ERCP per GI early next week  - zosyn, afebrile and nonleukocytotic              Essential hypertension    - Stroke risk factor  - SBP<130   - Lisinopril 40 mg daily                  Cholelithiasis    - ERCP per [facility] early next week                      Neurologic Chief Complaint: R temporal ICH    Subjective:     Interval History: Patient is seen for follow-up neurological assessment and treatment recommendations: difficult to arouse this morning, but more awake around noon, MRI ordered to assess for mass.    HPI, Past Medical, Family, and Social History remains the same as documented in the initial encounter.     Review of Systems   Unable to perform ROS: Patient nonverbal     Scheduled Meds:   amlodipine  10 mg Per NG tube Daily    aspirin  81 mg Per NG tube Daily    famotidine  20 mg Per NG tube BID    heparin (porcine)  5,000 Units Subcutaneous Q12H    lactulose  10 g Per NG tube TID    levothyroxine  25 mcg Per NG tube Before breakfast    [START ON 7/29/2017] lisinopril  40 mg Per NG tube Daily    piperacillin-tazobactam 4.5 g in dextrose 5 % 100 mL IVPB (ready to mix system)  4.5 g Intravenous Q8H    pravastatin  40 mg Oral QHS    sodium chloride 0.9%  10 mL Intravenous Q6H    sodium chloride 0.9%  3 mL Intravenous Q8H    sodium chloride  1 g Per NG tube TID     Continuous Infusions:   sodium chloride 0.9% 75 mL/hr at 07/26/17 0821     PRN Meds:fentaNYL, hydrALAZINE, labetalol, lorazepam, Flushing PICC Protocol **AND** sodium chloride 0.9% **AND** sodium chloride 0.9%    Objective:     Vital Signs (Most Recent):  Temp: 98 °F (36.7 °C) (07/28/17 0430)  Pulse: 82 (07/28/17 0700)  Resp: 16 (07/28/17 0430)  BP: (!) 155/83 (07/28/17 0430)  SpO2: 97 % (07/28/17 0430)  BP Location: Left arm    Vital Signs Range (Last 24H):  Temp:  [97.7 °F (36.5 °C)-99.4 °F (37.4 °C)]   Pulse:  [77-96]   Resp:  [16-18]   BP: (127-184)/(69-98)   SpO2:  [96 %-99 %]    BP Location: Left arm    Physical Exam   Constitutional: No distress.   Thin middle aged woman, resting in bed, no distress   HENT:   Head: Normocephalic and atraumatic.   Eyes: EOM are normal. Pupils are equal, round, and reactive to light.   Scleral icterus   Cardiovascular: Normal rate.    Pulmonary/Chest: Effort normal. No respiratory distress.   Abdominal: Soft.   Musculoskeletal: Normal range of motion. She exhibits no edema.   Neurological: She is alert. GCS eye subscore is 4. GCS verbal subscore is 5. GCS motor subscore is 6.   Global aphasia, not following commands   Skin: Skin is warm and dry.   Jaundice skin   Vitals reviewed.      Neurological Exam:   LOC: alert, not following commands  Motor*: moves all extremities spontaneously, strength 4/5 in all 4 limbs; does not move to command.     NIH Stroke Scale:    Level of Consciousness: 0 - alert  LOC Questions: 2 - answers none correctly  LOC Commands: 2 - performs neither correctly  Best Gaze: 0 - normal  Visual: 0 - no visual loss  Facial Palsy: 0 - normal  Motor Left Arm: 0 - no drift  Motor Right Arm: 0 - no drift  Motor Left Le - no drift  Motor Right Le - no drift  Limb Ataxia: 0 - absent  Sensory: 0 - normal  Best Language: 3 - mute  Dysarthria: 2 - near to unintelligible  Extinction and Inattention: 0 - no neglect  NIH Stroke Scale Total: 9  [place] Coma Scale:  Best Eye Response: 4  Best Motor Response: 6  Best Verbal Response: 5        Laboratory:  CMP:     Recent Labs  Lab 17  0618   CALCIUM 8.7   ALBUMIN 2.7*   PROT 6.6     140  140   K 5.0   CO2 19*   *   BUN 10   CREATININE 0.9   ALKPHOS 256*   ALT 47*   AST 41*   BILITOT 3.8*     CBC:     Recent Labs  Lab 17  0618   WBC 7.85   RBC 3.66*   HGB 11.3*   HCT 34.1*   *   MCV 93   MCH 30.9   MCHC 33.1     Lipid Panel:     Recent Labs  Lab 17  1708   CHOL 245*   LDLCALC 172.6*   HDL 27*   TRIG 227*     Coagulation:     Recent Labs  Lab 17  0618    INR 1.0     Platelet Aggregation Study: No results for input(s): PLTAGG, PLTAGINTERP, PLTAGREGLACO, ADPPLTAGGREG in the last 168 hours.  Hgb A1C:   No results for input(s): HGBA1C in the last 168 hours.  TSH:   No results for input(s): TSH in the last 168 hours.        Agnes Grace MD  [facility]

## 2017-07-28 NOTE — PLAN OF CARE
Plan for ERCP on Monday, d/c plan remains return to [facility] at time of d/c.

## 2017-07-28 NOTE — PLAN OF CARE
Problem: Patient Care Overview  Goal: Individualization & Mutuality  Admit Date: 7/19/2017    Admit Dx: R temporal RASHAAD    Past Medical History:  No date: Asthma  No date: Coronary artery disease  No date: Depression  No date: Emphysema, unspecified  No date: GERD (gastroesophageal reflux disease)  No date: Hypertension  No date: Thyroid disease    History reviewed. No pertinent surgical history.    Individualization:   1.     Restraints: (date/time/why or N/A)    Bilateral soft wrist restraints applied by previous care provider for pulling lines. 7/20 1615  Bilateral soft wrist restraints in place and renewed on 07/25/2017 due to pt pulling at lines.        Outcome: Ongoing (interventions implemented as appropriate)  POC reviewed with patient, no evidence of learning. Patient made verbal attempts overnight, able to state name, birthday, and that she has three kids. Loosened restraints at 0400, patient making no attempts thus far to pull at NGT. Patient urinating frequently, copious amounts. Unable to quantify due to incontinence, purewick placed. Able to reposition herself independently, prefers side lying position. Skin free from breakdown, excluding scab on right knee that is healing. PRN hydralazine given for BP above parameters, treatment effective. No falls or injury, all safety precautions maintained, will continue to monitor.

## 2017-07-28 NOTE — SUBJECTIVE & OBJECTIVE
Neurologic Chief Complaint: R temporal ICH    Subjective:     Interval History: Patient is seen for follow-up neurological assessment and treatment recommendations: difficult to arouse this morning, but more awake around noon, MRI ordered to assess for mass.    HPI, Past Medical, Family, and Social History remains the same as documented in the initial encounter.     Review of Systems   Unable to perform ROS: Patient nonverbal     Scheduled Meds:   amlodipine  10 mg Per NG tube Daily    aspirin  81 mg Per NG tube Daily    famotidine  20 mg Per NG tube BID    heparin (porcine)  5,000 Units Subcutaneous Q12H    lactulose  10 g Per NG tube TID    levothyroxine  25 mcg Per NG tube Before breakfast    [START ON 7/29/2017] lisinopril  40 mg Per NG tube Daily    piperacillin-tazobactam 4.5 g in dextrose 5 % 100 mL IVPB (ready to mix system)  4.5 g Intravenous Q8H    pravastatin  40 mg Oral QHS    sodium chloride 0.9%  10 mL Intravenous Q6H    sodium chloride 0.9%  3 mL Intravenous Q8H    sodium chloride  1 g Per NG tube TID     Continuous Infusions:   sodium chloride 0.9% 75 mL/hr at 07/26/17 0821     PRN Meds:fentaNYL, hydrALAZINE, labetalol, lorazepam, Flushing PICC Protocol **AND** sodium chloride 0.9% **AND** sodium chloride 0.9%    Objective:     Vital Signs (Most Recent):  Temp: 98 °F (36.7 °C) (07/28/17 0430)  Pulse: 82 (07/28/17 0700)  Resp: 16 (07/28/17 0430)  BP: (!) 155/83 (07/28/17 0430)  SpO2: 97 % (07/28/17 0430)  BP Location: Left arm    Vital Signs Range (Last 24H):  Temp:  [97.7 °F (36.5 °C)-99.4 °F (37.4 °C)]   Pulse:  [77-96]   Resp:  [16-18]   BP: (127-184)/(69-98)   SpO2:  [96 %-99 %]   BP Location: Left arm    Physical Exam   Constitutional: No distress.   Thin middle aged woman, resting in bed, no distress   HENT:   Head: Normocephalic and atraumatic.   Eyes: EOM are normal. Pupils are equal, round, and reactive to light.   Scleral icterus   Cardiovascular: Normal rate.     Pulmonary/Chest: Effort normal. No respiratory distress.   Abdominal: Soft.   Musculoskeletal: Normal range of motion. She exhibits no edema.   Neurological: She is alert. GCS eye subscore is 4. GCS verbal subscore is 5. GCS motor subscore is 6.   Global aphasia, not following commands   Skin: Skin is warm and dry.   Jaundice skin   Vitals reviewed.      Neurological Exam:   LOC: alert, not following commands  Motor*: moves all extremities spontaneously, strength 4/5 in all 4 limbs; does not move to command.     NIH Stroke Scale:    Level of Consciousness: 0 - alert  LOC Questions: 2 - answers none correctly  LOC Commands: 2 - performs neither correctly  Best Gaze: 0 - normal  Visual: 0 - no visual loss  Facial Palsy: 0 - normal  Motor Left Arm: 0 - no drift  Motor Right Arm: 0 - no drift  Motor Left Le - no drift  Motor Right Le - no drift  Limb Ataxia: 0 - absent  Sensory: 0 - normal  Best Language: 3 - mute  Dysarthria: 2 - near to unintelligible  Extinction and Inattention: 0 - no neglect  NIH Stroke Scale Total: 9  [place] Coma Scale:  Best Eye Response: 4  Best Motor Response: 6  Best Verbal Response: 5        Laboratory:  CMP:     Recent Labs  Lab 17  0618   CALCIUM 8.7   ALBUMIN 2.7*   PROT 6.6     140  140   K 5.0   CO2 19*   *   BUN 10   CREATININE 0.9   ALKPHOS 256*   ALT 47*   AST 41*   BILITOT 3.8*     CBC:     Recent Labs  Lab 17  0618   WBC 7.85   RBC 3.66*   HGB 11.3*   HCT 34.1*   *   MCV 93   MCH 30.9   MCHC 33.1     Lipid Panel:     Recent Labs  Lab 17  1708   CHOL 245*   LDLCALC 172.6*   HDL 27*   TRIG 227*     Coagulation:     Recent Labs  Lab 17  0618   INR 1.0     Platelet Aggregation Study: No results for input(s): PLTAGG, PLTAGINTERP, PLTAGREGLACO, ADPPLTAGGREG in the last 168 hours.  Hgb A1C:   No results for input(s): HGBA1C in the last 168 hours.  TSH:   No results for input(s): TSH in the last 168 hours.

## 2017-07-28 NOTE — NURSING TRANSFER
Nursing Transfer Note      7/28/2017     Transfer:  From [facility]   Transfer to MRI  Transfer via stretcher    Transfer with cardiac monitoring    Transported by pt. transport  Medicines sent: none     Chart send with patient: no    Notified:     Patient reassessed at:  (date, time)    Upon arrival to floor:

## 2017-07-28 NOTE — PLAN OF CARE
Problem: SLP Goal  Goal: SLP Goal  Speech Language Pathology Goals  Updated Goals expected to be met by 8/4    1. Pt will tolerate honey thick liquids and purees without overt s/s of aspiration   2. Pt will participate in speech language cognitive evaluation MET  3. Pt will follow 1 step commands with 40% accuracy and max cues  4. Pt will answer simple yes/no questions with 50% accuracy and max cues  5. Pt will vocalize x3 throughout session         Pt participated in MBSS this date. Appears safe to initiate diet of honey thick liquids and purees. See note for details.     Zenaida Gaspar MS, CCC-SLP  Speech Language Pathologist  Pager: [phone]  Date 7/28/2017

## 2017-07-28 NOTE — PT/OT/SLP RE-EVAL
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    Past Medical History:   Diagnosis Date    Asthma     Coronary artery disease     Depression     Emphysema, unspecified     GERD (gastroesophageal reflux disease)     Hypertension     Thyroid disease       History reviewed. No pertinent surgical history.    Referring physician: MD Isaiah  Date referred to OT: 7/27/17    General Precautions: Standard, aspiration, seizure, fall, NPO (cardiac)    Patient History:  Living Environment  Lives With: facility resident  Transportation Available: agency transportation  Living Environment Comment: See PT evaluation for history.    Prior level of function:    Dominant hand: pt with no response    Subjective:  Communicated with RN prior to session.  Pt nodded head yes for all questions.  Chief Complaint: none stated  Patient/Family stated goals: none stated    Pain/Comfort  Pain Rating 1: 0/10  Pain Rating Post-Intervention 1: 0/10 (no indication of pain during session)    Objective:  Patient found with: telemetry, restraints, peripheral IV, NG tube (pure wick)    Cognitive Exam:  Oriented to: pt with head nodding yes to all questions, provided daily orientation  Follows Commands/attention: pt followed no commands.  Communication: expressive aphasia and receptive aphasia  Memory:  KATHY  Safety awareness/insight to disability: impaired  Coping skills/emotional control: Appropriate to situation    Visual/perceptual:  No issues noted, will have to continue to assess    Physical Exam:  Postural examination/scapula alignment: Rounded  shoulder, Head forward, Posterior pelvic tilt and Lateral weight shift of hips  Skin integrity: Visible skin intact  Edema: None noted     Sensation:   KATHY fully    Upper Extremity Range of Motion:  Right Upper Extremity: PROM WFL spontaneous movement noted (tendency to fist with hand)  Left Upper Extremity: PROM WFL (spontaneous movements noted)    Upper Extremity Strength:  Right Upper Extremity: spontaneous movement noted  Left Upper Extremity: spontaneous movement noted however none to command   Strength: KATHY due to pt did not perform to request      Functional Mobility:  Bed Mobility:  Rolling/Turning to Left: Total assistance  Rolling/Turning Right: Total assistance  Scooting/Bridging: Total Assistance (scooting forward & to the right along EOB)  Supine to Sit: Total Assistance  Sit to Supine: Total Assistance    Activities of Daily Living:     UE Dressing Level of Assistance: Total assistance (seated EOB)  LE Dressing Level of Assistance: Total assistance (seated EOB)  Grooming Position: Seated, EOB  Grooming Level of Assistance: Total assistance  Toileting Where Assessed: Bed level  Toileting Level of Assistance: Total assistance (due to incontinent urine episode)       Therapeutic Activities and Exercises:  Pt required Min-Max (A) with postural control while seated EOB.  Pt with eyes open 100% of session.  Pt with noted increased tone in RUE during session.  Provided towel roll to right hand due to fisting noted.  Provided PROM to BUE in all planes x 10 reps each while supine.      AM-PAC 6 CLICK ADL  How much help from another person does this patient currently need?  1 = Unable, Total/Dependent Assistance  2 = A lot, Maximum/Moderate Assistance  3 = A little, Minimum/Contact Guard/Supervision  4 = None, Modified [place]/Independent    Putting on and taking off regular lower body clothing? : 1  Bathing (including washing, rinsing, drying)?: 1  Toileting, which includes using toilet, bedpan, or  "urinal? : 1  Putting on and taking off regular upper body clothing?: 1  Taking care of personal grooming such as brushing teeth?: 1  Eating meals?: 1  Total Score: 6    AM-PAC Raw Score CMS "G-Code Modifier Level of Impairment Assistance   6 % Total / Unable   7 - 9 CM 80 - 100% Maximal Assist   10 - 14 CL 60 - 80% Moderate Assist   15 - 19 CK 40 - 60% Moderate Assist   20 - 22 CJ 20 - 40% Minimal Assist   23 CI 1-20% SBA / CGA   24 CH 0% Independent/ Mod I       Patient left supine with all lines intact, call button in reach, bed alarm on, restraints reapplied at end of session, RN notified and white board updated.    Assessment:  Malina Robles is a 57 y.o. female with a medical diagnosis of Nontraumatic intracerebral hemorrhage in hemisphere, unspecified and presents with deficits in cognition, speech, ROM, strength, & sensation affecting ability to perform ADl's, IADL's, roles & responsibilities at prior level of functioning.  Pt would benefit from OT to address deficits and their affect on performance of ADL's.    Rehab identified problem list/impairments: Rehab identified problem list/impairments: weakness, impaired endurance, impaired self care skills, impaired sensation, impaired functional mobilty, impaired balance, decreased coordination, impaired cognition, decreased upper extremity function, decreased lower extremity function, decreased safety awareness    Rehab potential is fair.    Activity tolerance: Fair    Discharge recommendations: Discharge Facility/Level Of Care Needs: nursing facility, skilled     GOALS:    Occupational Therapy Goals        Problem: Occupational Therapy Goal    Goal Priority Disciplines Outcome Interventions   Occupational Therapy Goal     OT, PT/OT Ongoing (interventions implemented as appropriate)    Description:  Goals to be met by: 8/11/17     Patient will increase functional independence with ADLs by performing:    UE Dressing with Maximum Assistance.  LE " Dressing with Maximum Assistance.  Grooming while seated with Moderate Assistance.  Toileting from bedside commode with Maximum Assistance for hygiene and clothing management.   Sitting at edge of bed x15 minutes with Contact Guard Assistance.  Rolling to Bilateral with Minimal Assistance.   Supine to sit with Moderate Assistance.  Stand pivot transfers with Max Assistance.  Pt will follow 3/4 one step commands.                      PLAN:  Patient to be seen 6 x/week to address the above listed problems via self-care/home management, therapeutic activities, therapeutic exercises, sensory integration, neuromuscular re-education, cognitive retraining  Plan of Care expires: 08/27/17  Plan of Care reviewed with: patient         Caitlin KAEL Eng  07/28/2017

## 2017-07-28 NOTE — PROCEDURES
DATE OF PROCEDURE:  07/28/2017    EEG #:  FH-.    REFERRING PHYSICIAN:  Dr. Colon.    This EEG was performed to assess the subclinical seizures.    ELECTROENCEPHALOGRAM REPORT     METHODOLOGY:  Electroencephalographic (EEG) recording is recorded with   electrodes placed according to the International 10-20 placement system.  Thirty   two (32) channels of digital signal (sampling rate of 512/sec), including T1   and T2, were simultaneously recorded from the scalp and may include EKG, EMG,   and/or eye monitors.  Recording band pass was 0.1 to 512 Hz.  Digital video   recording of the patient is simultaneously recorded with the EEG.  The patient   is instructed to report clinical symptoms which may occur during the recording   session.  EEG and video recording are stored and archived in digital format.    Activation procedures, which include photic stimulation, hyperventilation and   instructing patients to perform simple tasks, are done in selected patients  The EEG is displayed on a monitor screen and can be reviewed using different   montages.  Computer assisted-analysis is employed to detect spike and   electrographic seizure activity.   The entire record is submitted for computer   analysis.  The entire recording is visually reviewed, and the times identified   by computer analysis as being spikes or seizures are reviewed again.    Compressed spectral analysis (CSA) is also performed on the activity recorded   from each individual channel.  This is displayed as a power display of   frequencies from 0 to 30 Hz over time.   The CSA is reviewed looking for   asymmetries in power between homologous areas of the scalp, then compared with   the original EEG recording.    [employer] software was also utilized in the review of this study.  This software   suite analyzes the EEG recording in multiple domains.  Coherence and rhythmicity   are computed to identify EEG sections which may contain organized seizures.     Each channel undergoes analysis to detect the presence of spike and sharp waves   which have special and morphological characteristics of epileptic activity.  The   routine EEG recording is converted from special into frequency domain.  This is   then displayed comparing homologous areas to identify areas of significant   asymmetry.  Algorithm to identify non-cortically generated artifact is used to   separate artifact from the EEG.      RECORDING TIMES:  Start on 07/28/2017 at hour 11 minute 10 second 5.  End on  07/28/2017 at hour 13 minute 14 second 23.    A total time of video EEG recording was 1 hour 54 minutes.    EEG FINDINGS:  The recording was obtained with a number of standard bipolar and   referential montages during wakefulness and drowsiness.  In the alert state, the   posterior background rhythm was a symmetric, well-modulated 7 to 8 Hz activity,   which reacted to eye opening.  During drowsiness, the background rhythm waxed   and waned and there were periods of slowing.  Focal suppression and slowing in   the background was noted in the right frontotemporal region.  There were no   interictal epileptiform abnormalities and no clinical or electrographic seizures   were recorded.    The EKG channel revealed sinus rhythm.    IMPRESSION:  This is an abnormal EEG during wakefulness and drowsiness.  Mild   slowing of the background was noted.  Focal slowing of the background was noted   in the right frontotemporal region.    CLINICAL CORRELATION:  The patient is a 57-year-old female with a history of   intraparenchymal hemorrhage who is currently maintained on Keppra.  This is an   abnormal EEG during wakefulness and drowsiness.  The overall degree of slowing   and disorganization is suggestive of a mild encephalopathy, nonspecific to the   cause.  The presence of focal slowing in the right frontotemporal region is   suggestive of focal neuronal dysfunction in this region correlates with a   history of  intraparenchymal hemorrhage.  There is no evidence of an epileptic   process on this recording.  No seizures were recorded during this study.      FAK/HN  dd: 07/28/2017 16:08:35 (CDT)  td: 07/28/2017 16:55:33 (CDT)  Doc ID   #[id number]  Job ID #[id number]    CC:

## 2017-07-28 NOTE — PROCEDURES
Modifed Barium Swallow Study  Speech Start Time: 1430  Speech Stop Time: 1445  Speech Total (min): 15 min    SLP Treatment Date: 07/28/17    Reason for Referral  Patient was referred for a Modified Barium Swallow Study to assess the efficiency of his/her swallow function, rule out aspiration and make recommendations regarding safe dietary consistencies, effective compensatory strategies, and safe eating environment.       Recommendations  1. Thicken ALL liquids to a HONEY thick consistency- 1 packet of thickener to 4 oz of fluid   2. Pureed solids   3. Assistance with thickening liquids  4. Assistance with meals   5. Meds crushed in puree  6. Make sure Pt awake alert and seated upright for all PO intake     Diagnosis   Nontraumatic intracerebral hemorrhage in hemisphere, unspecified    Past Medical History:   Diagnosis Date    Asthma     Coronary artery disease     Depression     Emphysema, unspecified     GERD (gastroesophageal reflux disease)     Hypertension     Thyroid disease      History reviewed. No pertinent surgical history.     General Precautions: aspiration, fall, NPO, seizure     Oral Peripheral Examination  Oral Musculature Evaluation  Oral Musculature: unable to assess due to poor participation/comprehension  Dentition: teeth in poor condition, scattered dentition  Voice Prior to PO Intake: hoarse    Procedure:      Visualization:  · Pt was seen in the lateral view  · Pt willingly accepted all trials  · Pt with intermittent difficulty following verbal commands within the context of the study and frequently moving throughout exam  · NG tube observed in place  · Pt previously in soft restraints which were undone for purpose of study     Consistencies Assessed  · Pt was offered 20 mL of thin liquid via single sips from open cup    · Pt was offered 35 mL of nectar thick liquid via single straw sips and single sips from open cup  · Pt was offered 75mL of thin-honey consistency liquid via single sips  from an open cup    · Pt was offered 30mL of puree via 1/2 teaspoon    · Pt was offered solid consistency coated with barium paste x1    Oral Preparation / Oral Phase  · Poor bolus formation and control across consistencies  · Anterior loss of bolus across thin and nectar thick liquid trials   · Premature loss of bolus to oropharynx across consistencies   · Prolonged oral prep and loss of bolus over tongue base appearing worse with solid trial     Pharyngeal Phase  · Pt with delayed swallow initiation with consistent pooling at the level of pyriform sinuses with thin and nectar thick liquids, intermittent pyriform sinus pooling with honey thick liquid trials, pooling at the valleculae for remaining consistencies     · Pt with reduced BOT retraction  · Pt with adequate hyolaryngeal elevation and excursion patterns  · Pt with complete epiglottic inversion patterns  · Pt with consistent flash penetration to the level of the vocal folds with thin and nectar thick liquids; cough response appreciated x1; reducing bolus size or delivery method of thin and nectar consistencies was not effective in reducing occurrence of laryngeal penetration   · Penetration was eliminated with use of thin-honey consistency    · No penetration observed with thin-honey consistency, purees or solid trials   · No aspiration events were appreciated across consistencies    · Given Pt inability to consistently follow directions additional compensatory strategies were unable to be attempted   · There was not presence of significant valleculae and/or pyriform sinus residue/stasis following all consistencies trialed     Cervical Esophageal Phase  · UES appeared to accommodate all bolus types without stasis or retrograde movement observed     Impressions  Pt with a moderate oral phase and mild-moderate pharyngeal phase dysphagia as characterized by poor bolus control and penetration with thin and nectar thick liquids.  Oral phase impairments appear be  major impacting factor for aspiration risk. Pt aspiration risk reduced but not eliminated with the use of honey thick liquids and pureed solids.     Prognosis : Fair    Plan/Education  Results were discussed with Pt- no evidence of learning appreciated   Results were discussed with Medical Team who was in agreement with plan       Zenaida Gaspar MS, CCC-SLP  Speech Language Pathologist  Pager: [phone]  Date 7/28/2017       Care Plan    SLP Goals        Problem: SLP Goal    Goal Priority Disciplines Outcome   SLP Goal     SLP    Description:  Speech Language Pathology Goals  Updated Goals expected to be met by 8/4    1. Pt will tolerate honey thick liquids and purees without overt s/s of aspiration   2. Pt will participate in speech language cognitive evaluation MET  3. Pt will follow 1 step commands with 40% accuracy and max cues  4. Pt will answer simple yes/no questions with 50% accuracy and max cues  5. Pt will vocalize x3 throughout session

## 2017-07-29 PROBLEM — R13.10 DYSPHAGIA: Status: ACTIVE | Noted: 2017-07-29

## 2017-07-29 LAB
ALBUMIN SERPL BCP-MCNC: 2.7 G/DL
ALP SERPL-CCNC: 230 U/L
ALT SERPL W/O P-5'-P-CCNC: 48 U/L
AMMONIA PLAS-SCNC: 32 UMOL/L
AMYLASE SERPL-CCNC: 54 U/L
ANION GAP SERPL CALC-SCNC: 10 MMOL/L
AST SERPL-CCNC: 47 U/L
BASOPHILS # BLD AUTO: 0.13 K/UL
BASOPHILS NFR BLD: 1.7 %
BILIRUB SERPL-MCNC: 3.6 MG/DL
BUN SERPL-MCNC: 13 MG/DL
CALCIUM SERPL-MCNC: 8.6 MG/DL
CHLORIDE SERPL-SCNC: 111 MMOL/L
CO2 SERPL-SCNC: 19 MMOL/L
CREAT SERPL-MCNC: 0.8 MG/DL
DIFFERENTIAL METHOD: ABNORMAL
EOSINOPHIL # BLD AUTO: 0.3 K/UL
EOSINOPHIL NFR BLD: 4.1 %
ERYTHROCYTE [DISTWIDTH] IN BLOOD BY AUTOMATED COUNT: 15.9 %
EST. GFR  (AFRICAN AMERICAN): >60 ML/MIN/1.73 M^2
EST. GFR  (NON AFRICAN AMERICAN): >60 ML/MIN/1.73 M^2
GLUCOSE SERPL-MCNC: 82 MG/DL
HCT VFR BLD AUTO: 34.4 %
HGB BLD-MCNC: 11.3 G/DL
INR PPP: 1
LIPASE SERPL-CCNC: 89 U/L
LYMPHOCYTES # BLD AUTO: 2.6 K/UL
LYMPHOCYTES NFR BLD: 33.2 %
MAGNESIUM SERPL-MCNC: 1.7 MG/DL
MCH RBC QN AUTO: 31 PG
MCHC RBC AUTO-ENTMCNC: 32.8 G/DL
MCV RBC AUTO: 95 FL
MONOCYTES # BLD AUTO: 0.7 K/UL
MONOCYTES NFR BLD: 9 %
NEUTROPHILS # BLD AUTO: 4.1 K/UL
NEUTROPHILS NFR BLD: 51.7 %
PHOSPHATE SERPL-MCNC: 4.2 MG/DL
PLATELET # BLD AUTO: 164 K/UL
PMV BLD AUTO: 10.7 FL
POCT GLUCOSE: 82 MG/DL (ref 70–110)
POCT GLUCOSE: 86 MG/DL (ref 70–110)
POTASSIUM SERPL-SCNC: 3.3 MMOL/L
PROT SERPL-MCNC: 6.5 G/DL
PROTHROMBIN TIME: 10.8 SEC
RBC # BLD AUTO: 3.64 M/UL
SODIUM SERPL-SCNC: 140 MMOL/L
SODIUM SERPL-SCNC: 140 MMOL/L
WBC # BLD AUTO: 7.87 K/UL

## 2017-07-29 PROCEDURE — 85610 PROTHROMBIN TIME: CPT

## 2017-07-29 PROCEDURE — 20600001 HC STEP DOWN PRIVATE ROOM

## 2017-07-29 PROCEDURE — 63600175 PHARM REV CODE 636 W HCPCS: Performed by: NURSE PRACTITIONER

## 2017-07-29 PROCEDURE — 99233 SBSQ HOSP IP/OBS HIGH 50: CPT | Mod: ,,, | Performed by: PSYCHIATRY & NEUROLOGY

## 2017-07-29 PROCEDURE — 80053 COMPREHEN METABOLIC PANEL: CPT

## 2017-07-29 PROCEDURE — 25000003 PHARM REV CODE 250: Performed by: PHYSICIAN ASSISTANT

## 2017-07-29 PROCEDURE — 25000003 PHARM REV CODE 250: Performed by: STUDENT IN AN ORGANIZED HEALTH CARE EDUCATION/TRAINING PROGRAM

## 2017-07-29 PROCEDURE — 83735 ASSAY OF MAGNESIUM: CPT

## 2017-07-29 PROCEDURE — 97110 THERAPEUTIC EXERCISES: CPT

## 2017-07-29 PROCEDURE — 97530 THERAPEUTIC ACTIVITIES: CPT

## 2017-07-29 PROCEDURE — 25000003 PHARM REV CODE 250: Performed by: PSYCHIATRY & NEUROLOGY

## 2017-07-29 PROCEDURE — 63600175 PHARM REV CODE 636 W HCPCS: Performed by: PSYCHIATRY & NEUROLOGY

## 2017-07-29 PROCEDURE — 84100 ASSAY OF PHOSPHORUS: CPT

## 2017-07-29 PROCEDURE — A4216 STERILE WATER/SALINE, 10 ML: HCPCS | Performed by: NURSE PRACTITIONER

## 2017-07-29 PROCEDURE — 85025 COMPLETE CBC W/AUTO DIFF WBC: CPT

## 2017-07-29 PROCEDURE — 82150 ASSAY OF AMYLASE: CPT

## 2017-07-29 PROCEDURE — 25000003 PHARM REV CODE 250: Performed by: NURSE PRACTITIONER

## 2017-07-29 PROCEDURE — 82140 ASSAY OF AMMONIA: CPT

## 2017-07-29 PROCEDURE — 63600175 PHARM REV CODE 636 W HCPCS: Performed by: STUDENT IN AN ORGANIZED HEALTH CARE EDUCATION/TRAINING PROGRAM

## 2017-07-29 PROCEDURE — 36415 COLL VENOUS BLD VENIPUNCTURE: CPT

## 2017-07-29 PROCEDURE — 83690 ASSAY OF LIPASE: CPT

## 2017-07-29 PROCEDURE — A4216 STERILE WATER/SALINE, 10 ML: HCPCS | Performed by: PHYSICIAN ASSISTANT

## 2017-07-29 RX ORDER — POTASSIUM CHLORIDE 20 MEQ/15ML
60 SOLUTION ORAL ONCE
Status: COMPLETED | OUTPATIENT
Start: 2017-07-29 | End: 2017-07-29

## 2017-07-29 RX ORDER — HYDROCHLOROTHIAZIDE 12.5 MG/1
12.5 TABLET ORAL DAILY
Status: DISCONTINUED | OUTPATIENT
Start: 2017-07-29 | End: 2017-07-31 | Stop reason: HOSPADM

## 2017-07-29 RX ORDER — MAGNESIUM SULFATE HEPTAHYDRATE 40 MG/ML
2 INJECTION, SOLUTION INTRAVENOUS ONCE
Status: COMPLETED | OUTPATIENT
Start: 2017-07-29 | End: 2017-07-29

## 2017-07-29 RX ADMIN — LACTULOSE 10 G: 20 SOLUTION ORAL at 09:07

## 2017-07-29 RX ADMIN — LEVOTHYROXINE SODIUM 25 MCG: 25 TABLET ORAL at 05:07

## 2017-07-29 RX ADMIN — PIPERACILLIN AND TAZOBACTAM 4.5 G: 4; .5 INJECTION, POWDER, LYOPHILIZED, FOR SOLUTION INTRAVENOUS; PARENTERAL at 09:07

## 2017-07-29 RX ADMIN — SODIUM CHLORIDE TAB 1 GM 1 G: 1 TAB at 03:07

## 2017-07-29 RX ADMIN — Medication 3 ML: at 03:07

## 2017-07-29 RX ADMIN — Medication 10 ML: at 06:07

## 2017-07-29 RX ADMIN — Medication 10 ML: at 12:07

## 2017-07-29 RX ADMIN — HEPARIN SODIUM 5000 UNITS: 5000 INJECTION, SOLUTION INTRAVENOUS; SUBCUTANEOUS at 09:07

## 2017-07-29 RX ADMIN — POTASSIUM CHLORIDE 60 MEQ: 20 SOLUTION ORAL at 09:07

## 2017-07-29 RX ADMIN — PRAVASTATIN SODIUM 40 MG: 20 TABLET ORAL at 09:07

## 2017-07-29 RX ADMIN — MAGNESIUM SULFATE IN WATER 2 G: 40 INJECTION, SOLUTION INTRAVENOUS at 09:07

## 2017-07-29 RX ADMIN — FAMOTIDINE 20 MG: 20 TABLET, FILM COATED ORAL at 09:07

## 2017-07-29 RX ADMIN — LISINOPRIL 40 MG: 20 TABLET ORAL at 09:07

## 2017-07-29 RX ADMIN — SODIUM CHLORIDE TAB 1 GM 1 G: 1 TAB at 05:07

## 2017-07-29 RX ADMIN — PIPERACILLIN AND TAZOBACTAM 4.5 G: 4; .5 INJECTION, POWDER, LYOPHILIZED, FOR SOLUTION INTRAVENOUS; PARENTERAL at 06:07

## 2017-07-29 RX ADMIN — ASPIRIN 81 MG CHEWABLE TABLET 81 MG: 81 TABLET CHEWABLE at 09:07

## 2017-07-29 RX ADMIN — PIPERACILLIN AND TAZOBACTAM 4.5 G: 4; .5 INJECTION, POWDER, LYOPHILIZED, FOR SOLUTION INTRAVENOUS; PARENTERAL at 03:07

## 2017-07-29 RX ADMIN — Medication 10 ML: at 03:07

## 2017-07-29 RX ADMIN — FENTANYL CITRATE 50 MCG: 50 INJECTION INTRAMUSCULAR; INTRAVENOUS at 04:07

## 2017-07-29 RX ADMIN — LACTULOSE 10 G: 20 SOLUTION ORAL at 05:07

## 2017-07-29 RX ADMIN — SODIUM CHLORIDE TAB 1 GM 1 G: 1 TAB at 09:07

## 2017-07-29 RX ADMIN — Medication 3 ML: at 10:07

## 2017-07-29 RX ADMIN — HYDROCHLOROTHIAZIDE 12.5 MG: 12.5 TABLET ORAL at 03:07

## 2017-07-29 RX ADMIN — LACTULOSE 10 G: 20 SOLUTION ORAL at 03:07

## 2017-07-29 RX ADMIN — AMLODIPINE BESYLATE 10 MG: 10 TABLET ORAL at 09:07

## 2017-07-29 RX ADMIN — Medication 3 ML: at 06:07

## 2017-07-29 NOTE — SUBJECTIVE & OBJECTIVE
Neurologic Chief Complaint: R temporal ICH    Subjective:     Interval History: Patient is seen for follow-up neurological assessment and treatment recommendations:     No acute events overnight.  Attempting trial of PO diet.      HPI, Past Medical, Family, and Social History remains the same as documented in the initial encounter.     Review of Systems   Unable to perform ROS: Patient nonverbal     Scheduled Meds:   amlodipine  10 mg Oral Daily    aspirin  81 mg Oral Daily    famotidine  20 mg Oral BID    heparin (porcine)  5,000 Units Subcutaneous Q12H    hydrochlorothiazide  12.5 mg Oral Daily    lactulose  10 g Oral TID    levothyroxine  25 mcg Oral Before breakfast    lisinopril  40 mg Oral Daily    piperacillin-tazobactam 4.5 g in dextrose 5 % 100 mL IVPB (ready to mix system)  4.5 g Intravenous Q8H    pravastatin  40 mg Oral QHS    sodium chloride 0.9%  10 mL Intravenous Q6H    sodium chloride 0.9%  3 mL Intravenous Q8H    sodium chloride  1 g Oral TID     Continuous Infusions:   sodium chloride 0.9% 75 mL/hr at 07/28/17 2205     PRN Meds:fentaNYL, hydrALAZINE, labetalol, lorazepam, Flushing PICC Protocol **AND** sodium chloride 0.9% **AND** sodium chloride 0.9%    Objective:     Vital Signs (Most Recent):  Temp: 98.3 °F (36.8 °C) (07/29/17 1100)  Pulse: 91 (07/29/17 1100)  Resp: 20 (07/29/17 1100)  BP: 124/86 (07/29/17 1100)  SpO2: 98 % (07/29/17 1100)  BP Location: Left arm    Vital Signs Range (Last 24H):  Temp:  [97.9 °F (36.6 °C)-98.3 °F (36.8 °C)]   Pulse:  [75-92]   Resp:  [17-20]   BP: (124-154)/(76-86)   SpO2:  [98 %-99 %]   BP Location: Left arm    Physical Exam   Constitutional: She appears well-developed and well-nourished. No distress.   Cardiovascular: Normal rate and regular rhythm.  Exam reveals no friction rub.    No murmur heard.  Pulmonary/Chest: Effort normal and breath sounds normal. No respiratory distress. She has no wheezes.   Abdominal: Soft. Bowel sounds are normal. She  exhibits no distension. There is no tenderness.   Neurological: GCS eye subscore is 4. GCS verbal subscore is 5. GCS motor subscore is 6.       Neurological Exam:   LOC: alert and does not follow requests  Language: Mute  Speech: Mute    NIH Stroke Scale:    Level of Consciousness: 0 - alert  LOC Questions: 2 - answers none correctly  LOC Commands: 2 - performs neither correctly  Best Gaze: 0 - normal  Visual: 0 - no visual loss  Facial Palsy: 0 - normal  Motor Left Arm: 1 - drift  Motor Right Arm: 1 - drift  Motor Left Le - no drift  Motor Right Le - no drift  Limb Ataxia: 0 - absent  Sensory: 0 - normal  Best Language: 3 - mute  Dysarthria: 2 - near to unintelligible  Extinction and Inattention: 0 - no neglect  NIH Stroke Scale Total: 11  Trino Coma Scale:  Best Eye Response: 4  Best Motor Response: 6  Best Verbal Response: 5        Laboratory:  Recent Results (from the past 24 hour(s))   Sodium    Collection Time: 17  5:57 AM   Result Value Ref Range    Sodium 140 136 - 145 mmol/L   Amylase    Collection Time: 17  5:57 AM   Result Value Ref Range    Amylase 54 20 - 110 U/L   CBC auto differential    Collection Time: 17  5:57 AM   Result Value Ref Range    WBC 7.87 3.90 - 12.70 K/uL    RBC 3.64 (L) 4.00 - 5.40 M/uL    Hemoglobin 11.3 (L) 12.0 - 16.0 g/dL    Hematocrit 34.4 (L) 37.0 - 48.5 %    MCV 95 82 - 98 fL    MCH 31.0 27.0 - 31.0 pg    MCHC 32.8 32.0 - 36.0 g/dL    RDW 15.9 (H) 11.5 - 14.5 %    Platelets 164 150 - 350 K/uL    MPV 10.7 9.2 - 12.9 fL    Gran # 4.1 1.8 - 7.7 K/uL    Lymph # 2.6 1.0 - 4.8 K/uL    Mono # 0.7 0.3 - 1.0 K/uL    Eos # 0.3 0.0 - 0.5 K/uL    Baso # 0.13 0.00 - 0.20 K/uL    Gran% 51.7 38.0 - 73.0 %    Lymph% 33.2 18.0 - 48.0 %    Mono% 9.0 4.0 - 15.0 %    Eosinophil% 4.1 0.0 - 8.0 %    Basophil% 1.7 0.0 - 1.9 %    Differential Method Automated    Ammonia    Collection Time: 17  5:57 AM   Result Value Ref Range    Ammonia 32 10 - 50 umol/L    Comprehensive metabolic panel    Collection Time: 07/29/17  5:57 AM   Result Value Ref Range    Sodium 140 136 - 145 mmol/L    Potassium 3.3 (L) 3.5 - 5.1 mmol/L    Chloride 111 (H) 95 - 110 mmol/L    CO2 19 (L) 23 - 29 mmol/L    Glucose 82 70 - 110 mg/dL    BUN, Bld 13 6 - 20 mg/dL    Creatinine 0.8 0.5 - 1.4 mg/dL    Calcium 8.6 (L) 8.7 - 10.5 mg/dL    Total Protein 6.5 6.0 - 8.4 g/dL    Albumin 2.7 (L) 3.5 - 5.2 g/dL    Total Bilirubin 3.6 (H) 0.1 - 1.0 mg/dL    Alkaline Phosphatase 230 (H) 55 - 135 U/L    AST 47 (H) 10 - 40 U/L    ALT 48 (H) 10 - 44 U/L    Anion Gap 10 8 - 16 mmol/L    eGFR if African American >60.0 >60 mL/min/1.73 m^2    eGFR if non African American >60.0 >60 mL/min/1.73 m^2   Lipase    Collection Time: 07/29/17  5:57 AM   Result Value Ref Range    Lipase 89 (H) 4 - 60 U/L   Magnesium    Collection Time: 07/29/17  5:57 AM   Result Value Ref Range    Magnesium 1.7 1.6 - 2.6 mg/dL   Phosphorus    Collection Time: 07/29/17  5:57 AM   Result Value Ref Range    Phosphorus 4.2 2.7 - 4.5 mg/dL   Protime-INR    Collection Time: 07/29/17  5:57 AM   Result Value Ref Range    Prothrombin Time 10.8 9.0 - 12.5 sec    INR 1.0 0.8 - 1.2   POCT glucose    Collection Time: 07/29/17  6:02 AM   Result Value Ref Range    POCT Glucose 82 70 - 110 mg/dL       Diagnostic Results:  I have personally reviewed: MRI Head. Date: 7/28/17  Findings: R temporal lobe hemorrhage roughly stable when compared to prior CTH (7/24) and attempting to account for motion artifact.

## 2017-07-29 NOTE — PT/OT/SLP PROGRESS
Physical Therapy  Treatment    Malina Robles   MRN: [id number]   Admitting Diagnosis: Nontraumatic intracerebral hemorrhage in hemisphere, unspecified    PT Received On: 07/29/17  PT Start Time: 1551     PT Stop Time: 1616    PT Total Time (min): 25 min       Billable Minutes:  Therapeutic Activity 10 and Therapeutic Exercise 15    Treatment Type: Treatment  PT/PTA: PTA     PTA Visit Number: 2       General Precautions: Standard, aspiration, fall, NPO, seizure (cardiac)  Orthopedic Precautions: N/A   Braces: N/A         Subjective:  Communicated with RN (Romy) prior to session.  Pt non-verbal    Pain/Comfort  Pain Rating 1: 0/10  Pain Rating Post-Intervention 1: 0/10    Objective:   Patient found with: bed alarm, peripheral IV, telemetry (sup in bed with pads/gown saturated in urine.  no family present.)        FUNCTIONAL MOBILITY    Bed Mobility (with vc's for sequencing and safe technique of functional mobility):        Rolling bilaterally with mod A      THERAPEUTIC EXERCISES         Pt receives PROM to R LE sup in bed (ankle DF, HSs, hip add/abd, hip IR/ER)        x12 reps with VCs and TCs         Pt performs L LE AAROM ther ex's sup in bed (APs, HSs, hip add/abd)        x12 reps with VCs and TCs    Pt found to be wet with urine and requires assistance for change of pads and hygiene  Pt requires total A of 2 to scoot to HOB via drawsheet         Education:  Education provided to pt regarding: ROM ther ex's.     Whiteboard updated with correct mobility information. RN/PCT notified.  Pt safe to sit at the EOB with therapy ONLY at this time.         AM-PAC 6 CLICK MOBILITY  How much help from another person does this patient currently need?   1 = Unable, Total/Dependent Assistance  2 = A lot, Maximum/Moderate Assistance  3 = A little, Minimum/Contact Guard/Supervision  4 = None, Modified [place]/Independent    Turning over in bed (including adjusting bedclothes, sheets and blankets)?: 2  Sitting down on  and standing up from a chair with arms (e.g., wheelchair, bedside commode, etc.): 2  Moving from lying on back to sitting on the side of the bed?: 2  Moving to and from a bed to a chair (including a wheelchair)?: 2  Need to walk in hospital room?: 1  Climbing 3-5 steps with a railing?: 1  Total Score: 10    AM-PAC Raw Score CMS G-Code Modifier Level of Impairment Assistance   6 % Total / Unable   7 - 9 CM 80 - 100% Maximal Assist   10 - 14 CL 60 - 80% Moderate Assist   15 - 19 CK 40 - 60% Moderate Assist   20 - 22 CJ 20 - 40% Minimal Assist   23 CI 1-20% SBA / CGA   24 CH 0% Independent/ Mod I     Patient left supine with all lines intact, call button in reach, bed alarm on and RN notified.    Assessment:  Malina Robles is a 57 y.o. female with a medical diagnosis of Nontraumatic intracerebral hemorrhage in hemisphere, unspecified and presents with R HP, decreased ROM, impaired balance and cognitive deficits requiring significant assistance for bed mob and ther ex's.  Pt limited with command following at this time.    Rehab identified problem list/impairments: Rehab identified problem list/impairments: weakness, impaired endurance, impaired sensation, impaired self care skills, impaired functional mobilty, impaired balance, impaired cognition, decreased coordination, decreased upper extremity function, decreased lower extremity function, decreased safety awareness    Rehab potential is fair.    Activity tolerance: Good    Discharge recommendations: Discharge Facility/Level Of Care Needs: nursing facility, skilled, nursing facility, basic     Barriers to discharge: Barriers to Discharge: None    Equipment recommendations: Equipment Needed After Discharge: none     GOALS:    Physical Therapy Goals        Problem: Physical Therapy Goal    Goal Priority Disciplines Outcome Goal Variances Interventions   Physical Therapy Goal     PT/OT, PT Ongoing (interventions implemented as appropriate)     Description:   Goals to be met by: 8/3/2017     Patient will increase functional independence with mobility by performin. Pt will participate in bilateral UE and LE ROM exercises on 3 consecutive visits with no change in vitals.   2. Pt will perform rolling R and L with moderate assistance   3. Supine to sit with moderate assistance   4. Sit to supine with moderate assistance   5.  Pt will tolerate sitting EOB x 10 minutes with moderate assistance                        PLAN:    Patient to be seen 4 x/week  to address the above listed problems via therapeutic activities, therapeutic exercises, neuromuscular re-education  Plan of Care expires: 17  Plan of Care reviewed with: patient         Swapna Davis, PTA  2017

## 2017-07-29 NOTE — PROGRESS NOTES
[facility]  Vascular Neurology  Comprehensive Stroke Center  Progress Note    Assessment/Plan:     7/19: AMS this AM. CT head shows R temporal ICH with IVH. Transferred to [facility] ED and admitted to [facility]. Of note, patient's initial INR was 4.9.     07/20: remains intubated and on cardene, patient not sedated. See by GI for bile duct stone and plans to repeat ERCP. Patient with leukocytosis-cultures pending, on vanc/zosyn.   7/21/17 Remains intubated. CTA without abnormality to explain ICH.   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 Globally aphasic. Intermittent jaw movements. 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 Pending  EEG report. Pending ERCG and PEG.   7/27/17: neuro exam stable, still not following commands; d/c keppra, repeat EEG/MBSS tomorrow; ERCP next week  7/28 - continues to be difficult to arouse in the mornings, but is more awake around noon, ordered MRI brain to assess for mass    * Nontraumatic intracerebral hemorrhage in hemisphere, unspecified    57 y.o. female PMH hepatitis C with transaminitis, CAD, thyroid disease, and h/o CVA on ASA, plavix. CT with R temporal ICH. CTA without evidence of etiology and ECHO EF 60, normal atrial size.      7/28/17 MRI Brain limited by motion, grossly stable hemorrhage when compared to prior.    Antiplatelets: start ASA  Statins: Repeat LDL 170s; Pravastatin 40 mg (switch to high dose after LFTs improve)  SBP < 140  Diagnostics: MRI brain with contrast in 1 month, Pending EEG  PT/OT/ST -> SNF  DVT ppx: TEDs, SCDs        Dysphagia    ST recommending Pureed/honey-thick  7/29 - RN reports pt ate ~70% of meal  -Will discontinue IVF         Vasogenic cerebral edema    2/2 ICH  Evident on imaging        Hypothyroid    Continue synthroid 25 Qdaily        Calculus of bile duct without mention of cholecystitis, with obstruction    - Temporary stent in place, biliary system decompressed with improved hepatic function  - ERCP per GI early next week  - Zosyn        Essential hypertension    - Stroke risk factor  - SBP goal <140  -Lisinopril 40  -START HCTZ 12.5 mg Qdaily        Elevated liver enzymes    AST/ALT improving   INR 4.9 previously; now normal at 1.0  -continue pravastatin, anticipate switch to high-dose statin after LFTs normalize        Cholelithiasis    - ERCP per [facility] early next week        Jaundice    Patient with history of hep C and with current bile duct stone, had stent placed at OSH  GI following  AST 43 ALT 70  t bili 4.8  Will need repeat ERCP            Neurologic Chief Complaint: R temporal ICH    Subjective:     Interval History: Patient is seen for follow-up neurological assessment and treatment recommendations:     No acute events overnight.  Attempting trial of PO diet.      HPI, Past Medical, Family, and Social History remains the same as documented in the initial encounter.     Review of Systems   Unable to perform ROS: Patient nonverbal     Scheduled Meds:   amlodipine  10 mg Oral Daily    aspirin  81 mg Oral Daily    famotidine  20 mg Oral BID    heparin (porcine)  5,000 Units Subcutaneous Q12H    hydrochlorothiazide  12.5 mg Oral Daily    lactulose  10 g Oral TID    levothyroxine  25 mcg Oral Before breakfast    lisinopril  40 mg Oral Daily    piperacillin-tazobactam 4.5 g in dextrose 5 % 100 mL IVPB (ready to mix system)  4.5 g Intravenous Q8H    pravastatin  40 mg Oral QHS    sodium chloride 0.9%  10 mL Intravenous Q6H    sodium chloride 0.9%  3 mL Intravenous Q8H    sodium chloride  1 g Oral TID     Continuous Infusions:   sodium chloride 0.9% 75 mL/hr at 07/28/17 2205     PRN Meds:fentaNYL,  hydrALAZINE, labetalol, lorazepam, Flushing PICC Protocol **AND** sodium chloride 0.9% **AND** sodium chloride 0.9%    Objective:     Vital Signs (Most Recent):  Temp: 98.3 °F (36.8 °C) (17 1100)  Pulse: 91 (17 1100)  Resp: 20 (17 1100)  BP: 124/86 (17 1100)  SpO2: 98 % (17 1100)  BP Location: Left arm    Vital Signs Range (Last 24H):  Temp:  [97.9 °F (36.6 °C)-98.3 °F (36.8 °C)]   Pulse:  [75-92]   Resp:  [17-20]   BP: (124-154)/(76-86)   SpO2:  [98 %-99 %]   BP Location: Left arm    Physical Exam   Constitutional: She appears well-developed and well-nourished. No distress.   Cardiovascular: Normal rate and regular rhythm.  Exam reveals no friction rub.    No murmur heard.  Pulmonary/Chest: Effort normal and breath sounds normal. No respiratory distress. She has no wheezes.   Abdominal: Soft. Bowel sounds are normal. She exhibits no distension. There is no tenderness.   Neurological: GCS eye subscore is 4. GCS verbal subscore is 5. GCS motor subscore is 6.       Neurological Exam:   LOC: alert and does not follow requests  Language: Mute  Speech: Mute    NIH Stroke Scale:    Level of Consciousness: 0 - alert  LOC Questions: 2 - answers none correctly  LOC Commands: 2 - performs neither correctly  Best Gaze: 0 - normal  Visual: 0 - no visual loss  Facial Palsy: 0 - normal  Motor Left Arm: 1 - drift  Motor Right Arm: 1 - drift  Motor Left Le - no drift  Motor Right Le - no drift  Limb Ataxia: 0 - absent  Sensory: 0 - normal  Best Language: 3 - mute  Dysarthria: 2 - near to unintelligible  Extinction and Inattention: 0 - no neglect  NIH Stroke Scale Total: 11  [place]ino Coma Scale:  Best Eye Response: 4  Best Motor Response: 6  Best Verbal Response: 5        Laboratory:  Recent Results (from the past 24 hour(s))   Sodium    Collection Time: 17  5:57 AM   Result Value Ref Range    Sodium 140 136 - 145 mmol/L   Amylase    Collection Time: 17  5:57 AM   Result Value Ref  Range    Amylase 54 20 - 110 U/L   CBC auto differential    Collection Time: 07/29/17  5:57 AM   Result Value Ref Range    WBC 7.87 3.90 - 12.70 K/uL    RBC 3.64 (L) 4.00 - 5.40 M/uL    Hemoglobin 11.3 (L) 12.0 - 16.0 g/dL    Hematocrit 34.4 (L) 37.0 - 48.5 %    MCV 95 82 - 98 fL    MCH 31.0 27.0 - 31.0 pg    MCHC 32.8 32.0 - 36.0 g/dL    RDW 15.9 (H) 11.5 - 14.5 %    Platelets 164 150 - 350 K/uL    MPV 10.7 9.2 - 12.9 fL    Gran # 4.1 1.8 - 7.7 K/uL    Lymph # 2.6 1.0 - 4.8 K/uL    Mono # 0.7 0.3 - 1.0 K/uL    Eos # 0.3 0.0 - 0.5 K/uL    Baso # 0.13 0.00 - 0.20 K/uL    Gran% 51.7 38.0 - 73.0 %    Lymph% 33.2 18.0 - 48.0 %    Mono% 9.0 4.0 - 15.0 %    Eosinophil% 4.1 0.0 - 8.0 %    Basophil% 1.7 0.0 - 1.9 %    Differential Method Automated    Ammonia    Collection Time: 07/29/17  5:57 AM   Result Value Ref Range    Ammonia 32 10 - 50 umol/L   Comprehensive metabolic panel    Collection Time: 07/29/17  5:57 AM   Result Value Ref Range    Sodium 140 136 - 145 mmol/L    Potassium 3.3 (L) 3.5 - 5.1 mmol/L    Chloride 111 (H) 95 - 110 mmol/L    CO2 19 (L) 23 - 29 mmol/L    Glucose 82 70 - 110 mg/dL    BUN, Bld 13 6 - 20 mg/dL    Creatinine 0.8 0.5 - 1.4 mg/dL    Calcium 8.6 (L) 8.7 - 10.5 mg/dL    Total Protein 6.5 6.0 - 8.4 g/dL    Albumin 2.7 (L) 3.5 - 5.2 g/dL    Total Bilirubin 3.6 (H) 0.1 - 1.0 mg/dL    Alkaline Phosphatase 230 (H) 55 - 135 U/L    AST 47 (H) 10 - 40 U/L    ALT 48 (H) 10 - 44 U/L    Anion Gap 10 8 - 16 mmol/L    eGFR if African American >60.0 >60 mL/min/1.73 m^2    eGFR if non African American >60.0 >60 mL/min/1.73 m^2   Lipase    Collection Time: 07/29/17  5:57 AM   Result Value Ref Range    Lipase 89 (H) 4 - 60 U/L   Magnesium    Collection Time: 07/29/17  5:57 AM   Result Value Ref Range    Magnesium 1.7 1.6 - 2.6 mg/dL   Phosphorus    Collection Time: 07/29/17  5:57 AM   Result Value Ref Range    Phosphorus 4.2 2.7 - 4.5 mg/dL   Protime-INR    Collection Time: 07/29/17  5:57 AM   Result Value Ref  Range    Prothrombin Time 10.8 9.0 - 12.5 sec    INR 1.0 0.8 - 1.2   POCT glucose    Collection Time: 07/29/17  6:02 AM   Result Value Ref Range    POCT Glucose 82 70 - 110 mg/dL       Diagnostic Results:  I have personally reviewed: MRI Head. Date: 7/28/17  Findings: R temporal lobe hemorrhage roughly stable when compared to prior CTH (7/24) and attempting to account for motion artifact.          Dexter Miller MD  Comprehensive Stroke Center  Department of Vascular Neurology   [facility]

## 2017-07-29 NOTE — ASSESSMENT & PLAN NOTE
AST/ALT improving   INR 4.9 previously; now normal at 1.0  -continue pravastatin, anticipate switch to high-dose statin after LFTs normalize

## 2017-07-29 NOTE — ASSESSMENT & PLAN NOTE
57 y.o. female PMH hepatitis C with transaminitis, CAD, thyroid disease, and h/o CVA on ASA, plavix. CT with R temporal ICH. CTA without evidence of etiology and ECHO EF 60, normal atrial size.      7/28/17 MRI Brain limited by motion, grossly stable hemorrhage when compared to prior.    Antiplatelets: start ASA  Statins: Repeat LDL 170s; Pravastatin 40 mg (switch to high dose after LFTs improve)  SBP < 140  Diagnostics: MRI brain with contrast in 1 month, Pending EEG  PT/OT/ST -> SNF  DVT ppx: TEDs, SCDs

## 2017-07-29 NOTE — ASSESSMENT & PLAN NOTE
- Temporary stent in place, biliary system decompressed with improved hepatic function  - ERCP per GI early next week  - Zosyn

## 2017-07-30 LAB
ALBUMIN SERPL BCP-MCNC: 3 G/DL
ALP SERPL-CCNC: 238 U/L
ALT SERPL W/O P-5'-P-CCNC: 54 U/L
AMMONIA PLAS-SCNC: 26 UMOL/L
AMYLASE SERPL-CCNC: 59 U/L
ANION GAP SERPL CALC-SCNC: 11 MMOL/L
AST SERPL-CCNC: 53 U/L
BASOPHILS # BLD AUTO: 0.08 K/UL
BASOPHILS NFR BLD: 1 %
BILIRUB SERPL-MCNC: 3.9 MG/DL
BUN SERPL-MCNC: 12 MG/DL
CALCIUM SERPL-MCNC: 9.7 MG/DL
CHLORIDE SERPL-SCNC: 105 MMOL/L
CO2 SERPL-SCNC: 22 MMOL/L
CREAT SERPL-MCNC: 0.9 MG/DL
DIFFERENTIAL METHOD: ABNORMAL
EOSINOPHIL # BLD AUTO: 0.3 K/UL
EOSINOPHIL NFR BLD: 3.7 %
ERYTHROCYTE [DISTWIDTH] IN BLOOD BY AUTOMATED COUNT: 15.6 %
EST. GFR  (AFRICAN AMERICAN): >60 ML/MIN/1.73 M^2
EST. GFR  (NON AFRICAN AMERICAN): >60 ML/MIN/1.73 M^2
GLUCOSE SERPL-MCNC: 107 MG/DL
HCT VFR BLD AUTO: 34.8 %
HGB BLD-MCNC: 11.8 G/DL
LIPASE SERPL-CCNC: 95 U/L
LYMPHOCYTES # BLD AUTO: 3.1 K/UL
LYMPHOCYTES NFR BLD: 38.2 %
MAGNESIUM SERPL-MCNC: 1.9 MG/DL
MCH RBC QN AUTO: 31.2 PG
MCHC RBC AUTO-ENTMCNC: 33.9 G/DL
MCV RBC AUTO: 92 FL
MONOCYTES # BLD AUTO: 0.7 K/UL
MONOCYTES NFR BLD: 8.1 %
NEUTROPHILS # BLD AUTO: 3.9 K/UL
NEUTROPHILS NFR BLD: 48.8 %
PHOSPHATE SERPL-MCNC: 4.1 MG/DL
PLATELET # BLD AUTO: 187 K/UL
PMV BLD AUTO: 10.1 FL
POCT GLUCOSE: 102 MG/DL (ref 70–110)
POCT GLUCOSE: 117 MG/DL (ref 70–110)
POCT GLUCOSE: 99 MG/DL (ref 70–110)
POTASSIUM SERPL-SCNC: 3.3 MMOL/L
PROT SERPL-MCNC: 7.2 G/DL
RBC # BLD AUTO: 3.78 M/UL
SODIUM SERPL-SCNC: 138 MMOL/L
SODIUM SERPL-SCNC: 141 MMOL/L
WBC # BLD AUTO: 8.04 K/UL

## 2017-07-30 PROCEDURE — 63600175 PHARM REV CODE 636 W HCPCS: Performed by: NURSE PRACTITIONER

## 2017-07-30 PROCEDURE — 83735 ASSAY OF MAGNESIUM: CPT

## 2017-07-30 PROCEDURE — 25000003 PHARM REV CODE 250: Performed by: PHYSICIAN ASSISTANT

## 2017-07-30 PROCEDURE — 20600001 HC STEP DOWN PRIVATE ROOM

## 2017-07-30 PROCEDURE — 84295 ASSAY OF SERUM SODIUM: CPT

## 2017-07-30 PROCEDURE — 97110 THERAPEUTIC EXERCISES: CPT

## 2017-07-30 PROCEDURE — 63600175 PHARM REV CODE 636 W HCPCS: Performed by: PSYCHIATRY & NEUROLOGY

## 2017-07-30 PROCEDURE — A4216 STERILE WATER/SALINE, 10 ML: HCPCS | Performed by: NURSE PRACTITIONER

## 2017-07-30 PROCEDURE — 25000003 PHARM REV CODE 250: Performed by: NURSE PRACTITIONER

## 2017-07-30 PROCEDURE — 85025 COMPLETE CBC W/AUTO DIFF WBC: CPT

## 2017-07-30 PROCEDURE — 83690 ASSAY OF LIPASE: CPT

## 2017-07-30 PROCEDURE — 82140 ASSAY OF AMMONIA: CPT

## 2017-07-30 PROCEDURE — A4216 STERILE WATER/SALINE, 10 ML: HCPCS | Performed by: PHYSICIAN ASSISTANT

## 2017-07-30 PROCEDURE — 84100 ASSAY OF PHOSPHORUS: CPT

## 2017-07-30 PROCEDURE — 82150 ASSAY OF AMYLASE: CPT

## 2017-07-30 PROCEDURE — 99233 SBSQ HOSP IP/OBS HIGH 50: CPT | Mod: ,,, | Performed by: PSYCHIATRY & NEUROLOGY

## 2017-07-30 PROCEDURE — 97535 SELF CARE MNGMENT TRAINING: CPT

## 2017-07-30 PROCEDURE — 36415 COLL VENOUS BLD VENIPUNCTURE: CPT

## 2017-07-30 PROCEDURE — 25000003 PHARM REV CODE 250: Performed by: STUDENT IN AN ORGANIZED HEALTH CARE EDUCATION/TRAINING PROGRAM

## 2017-07-30 PROCEDURE — 80053 COMPREHEN METABOLIC PANEL: CPT

## 2017-07-30 RX ORDER — POTASSIUM CHLORIDE 20 MEQ/15ML
60 SOLUTION ORAL ONCE
Status: COMPLETED | OUTPATIENT
Start: 2017-07-30 | End: 2017-07-30

## 2017-07-30 RX ADMIN — LACTULOSE 10 G: 20 SOLUTION ORAL at 02:07

## 2017-07-30 RX ADMIN — PIPERACILLIN AND TAZOBACTAM 4.5 G: 4; .5 INJECTION, POWDER, LYOPHILIZED, FOR SOLUTION INTRAVENOUS; PARENTERAL at 01:07

## 2017-07-30 RX ADMIN — HEPARIN SODIUM 5000 UNITS: 5000 INJECTION, SOLUTION INTRAVENOUS; SUBCUTANEOUS at 09:07

## 2017-07-30 RX ADMIN — Medication 10 ML: at 06:07

## 2017-07-30 RX ADMIN — HYDROCHLOROTHIAZIDE 12.5 MG: 12.5 TABLET ORAL at 09:07

## 2017-07-30 RX ADMIN — SODIUM CHLORIDE TAB 1 GM 1 G: 1 TAB at 06:07

## 2017-07-30 RX ADMIN — Medication 10 ML: at 12:07

## 2017-07-30 RX ADMIN — FAMOTIDINE 20 MG: 20 TABLET, FILM COATED ORAL at 05:07

## 2017-07-30 RX ADMIN — SODIUM CHLORIDE TAB 1 GM 1 G: 1 TAB at 02:07

## 2017-07-30 RX ADMIN — PRAVASTATIN SODIUM 40 MG: 20 TABLET ORAL at 08:07

## 2017-07-30 RX ADMIN — Medication 10 ML: at 01:07

## 2017-07-30 RX ADMIN — AMLODIPINE BESYLATE 10 MG: 10 TABLET ORAL at 09:07

## 2017-07-30 RX ADMIN — LACTULOSE 10 G: 20 SOLUTION ORAL at 09:07

## 2017-07-30 RX ADMIN — HEPARIN SODIUM 5000 UNITS: 5000 INJECTION, SOLUTION INTRAVENOUS; SUBCUTANEOUS at 08:07

## 2017-07-30 RX ADMIN — LEVOTHYROXINE SODIUM 25 MCG: 25 TABLET ORAL at 06:07

## 2017-07-30 RX ADMIN — FAMOTIDINE 20 MG: 20 TABLET, FILM COATED ORAL at 09:07

## 2017-07-30 RX ADMIN — SODIUM CHLORIDE TAB 1 GM 1 G: 1 TAB at 09:07

## 2017-07-30 RX ADMIN — Medication 3 ML: at 06:07

## 2017-07-30 RX ADMIN — PIPERACILLIN AND TAZOBACTAM 4.5 G: 4; .5 INJECTION, POWDER, LYOPHILIZED, FOR SOLUTION INTRAVENOUS; PARENTERAL at 08:07

## 2017-07-30 RX ADMIN — POTASSIUM CHLORIDE 60 MEQ: 20 SOLUTION ORAL at 09:07

## 2017-07-30 RX ADMIN — Medication 3 ML: at 08:07

## 2017-07-30 RX ADMIN — Medication 10 ML: at 05:07

## 2017-07-30 RX ADMIN — LACTULOSE 10 G: 20 SOLUTION ORAL at 06:07

## 2017-07-30 RX ADMIN — LISINOPRIL 40 MG: 20 TABLET ORAL at 09:07

## 2017-07-30 RX ADMIN — ASPIRIN 81 MG CHEWABLE TABLET 81 MG: 81 TABLET CHEWABLE at 09:07

## 2017-07-30 RX ADMIN — PIPERACILLIN AND TAZOBACTAM 4.5 G: 4; .5 INJECTION, POWDER, LYOPHILIZED, FOR SOLUTION INTRAVENOUS; PARENTERAL at 05:07

## 2017-07-30 NOTE — PROGRESS NOTES
[facility]  Vascular Neurology  Comprehensive Stroke Center  Progress Note    Assessment/Plan:     7/19: AMS this AM. CT head shows R temporal ICH with IVH. [place] [facility] [place] [facility] to [facility]. Of note, patient's initial INR was 4.9.     07/20: remains intubated and on cardene, patient not sedated. See by GI for bile duct stone and plans to repeat ERCP. Patient with leukocytosis-cultures pending, on vanc/zosyn.   7/21/17 Remains intubated. CTA without abnormality to explain ICH.   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 Globally aphasic. Intermittent jaw movements. 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 Pending  EEG report. Pending ERCG and PEG.   7/27/17: neuro exam stable, still not following commands; d/c keppra, repeat EEG/MBSS tomorrow; ERCP next week  7/28 - continues to be difficult to arouse in the mornings, but is more awake around noon, ordered MRI brain to assess for mass    * Nontraumatic intracerebral hemorrhage in hemisphere, unspecified    57 y.o. female PMH hepatitis C with transaminitis, CAD, thyroid disease, and h/o CVA on ASA, plavix. CT with R temporal ICH. CTA without evidence of etiology and ECHO EF 60, normal atrial size.      7/28/17 MRI Brain limited by motion, grossly stable hemorrhage when compared to prior.    Antiplatelets: start ASA  Statins: Repeat LDL 170s; Pravastatin 40 mg (switch to high dose after LFTs improve)  SBP < 140  Diagnostics: MRI brain with contrast in 1 month, Pending EEG  PT/OT/ST -> SNF  DVT ppx: TEDs, SCDs        Dysphagia    ST recommending Pureed/honey-thick  7/29 - RN reports pt ate ~70% of meal  -Reeval need for any PEG         Vasogenic cerebral edema    2/2 ICH  Evident on imaging        Hypothyroid    Continue synthroid 25 Qdaily        Calculus of bile duct without mention of cholecystitis, with obstruction    - Temporary stent in place, biliary system decompressed with improved hepatic function  - Zosyn (initially started in neuro ICU for leukocytosis with end date of 7/25; then continued on 7/27, 7/28 and onwards in vascular neurology notes for calculus of bile duct without cholecystitis, w/ obstruction)   - Will discuss continued need for zosyn with GI/team tomorrow, as well as discussion regarding f/u ERCP +/- PEG.        Essential hypertension    - Stroke risk factor  - SBP goal <140  -Lisinopril 40  -HCTZ 12.5 mg Qdaily        Elevated liver enzymes    AST/ALT improving   INR 4.9 previously; now normal at 1.0  -continue pravastatin, anticipate switch to high-dose statin after LFTs normalize        Cholelithiasis    - ERCP per [facility] early next week, see above        Jaundice    Patient with history of hep C and with current bile duct stone, had stent placed at OSH  GI following  AST 43 ALT 70  t bili 4.8  Will need repeat ERCP            Neurologic Chief Complaint: R Temporal ICH    Subjective:     Interval History: Patient is seen for follow-up neurological assessment and treatment recommendations:     No acute events overnight.    Spoke with Dr. Birch with GI, recommends checking in with GI team tomorrow to discuss plan as far as ERCP, need for PEG at this point (tolerating PO pureed diet).      HPI, Past Medical, Family, and Social History remains the same as documented in the initial encounter.     Review of Systems   Unable to perform ROS: Patient nonverbal     Scheduled Meds:   amlodipine  10 mg Oral Daily    aspirin  81 mg Oral Daily    famotidine  20 mg Oral BID    heparin (porcine)  5,000 Units Subcutaneous Q12H    hydrochlorothiazide  12.5 mg Oral Daily    lactulose  10 g Oral TID    levothyroxine   25 mcg Oral Before breakfast    lisinopril  40 mg Oral Daily    piperacillin-tazobactam 4.5 g in dextrose 5 % 100 mL IVPB (ready to mix system)  4.5 g Intravenous Q8H    pravastatin  40 mg Oral QHS    sodium chloride 0.9%  10 mL Intravenous Q6H    sodium chloride 0.9%  3 mL Intravenous Q8H    sodium chloride  1 g Oral TID     Continuous Infusions:   PRN Meds:fentaNYL, hydrALAZINE, labetalol, lorazepam, Flushing PICC Protocol **AND** sodium chloride 0.9% **AND** sodium chloride 0.9%    Objective:     Vital Signs (Most Recent):  Temp: 97.1 °F (36.2 °C) (17 0800)  Pulse: 94 (17 1100)  Resp: 18 (17 0800)  BP: 125/83 (17 0800)  SpO2: 100 % (17 0800)  BP Location: Left arm    Vital Signs Range (Last 24H):  Temp:  [97.1 °F (36.2 °C)-98.7 °F (37.1 °C)]   Pulse:  [66-94]   Resp:  [18]   BP: (125-138)/(83-89)   SpO2:  [99 %-100 %]   BP Location: Left arm    Physical Exam   Constitutional: She appears well-developed and well-nourished. No distress.   Cardiovascular: Normal rate and regular rhythm.  Exam reveals no friction rub.    No murmur heard.  Pulmonary/Chest: Effort normal and breath sounds normal. No respiratory distress. She has no wheezes.   Abdominal: Soft. Bowel sounds are normal. She exhibits no distension. There is no tenderness.   Neurological: She is alert.       Neurological Exam:   LOC: alert and follows few requests  Language: Able to state her name  Speech: Mild dysarthria    NIH Stroke Scale:    Level of Consciousness: 0 - alert  LOC Questions: 2 - answers none correctly  LOC Commands: 2 - performs neither correctly  Best Gaze: 0 - normal  Visual: 0 - no visual loss  Facial Palsy: 0 - normal  Motor Left Arm: 1 - drift  Motor Right Arm: 1 - drift  Motor Left Le - no drift  Motor Right Le - no drift  Limb Ataxia: 0 - absent  Sensory: 0 - normal  Best Language: 3 - mute  Dysarthria: 1 - mild to moderate dysarthria  Extinction and Inattention: 0 - no neglect  NIH  Stroke Scale Total: 10      Laboratory:  Recent Results (from the past 24 hour(s))   POCT glucose    Collection Time: 07/29/17 11:29 PM   Result Value Ref Range    POCT Glucose 86 70 - 110 mg/dL   POCT glucose    Collection Time: 07/30/17  5:49 AM   Result Value Ref Range    POCT Glucose 99 70 - 110 mg/dL   Amylase    Collection Time: 07/30/17  6:00 AM   Result Value Ref Range    Amylase 59 20 - 110 U/L   Lipase    Collection Time: 07/30/17  6:00 AM   Result Value Ref Range    Lipase 95 (H) 4 - 60 U/L   Comprehensive metabolic panel    Collection Time: 07/30/17  6:00 AM   Result Value Ref Range    Sodium 138 136 - 145 mmol/L    Potassium 3.3 (L) 3.5 - 5.1 mmol/L    Chloride 105 95 - 110 mmol/L    CO2 22 (L) 23 - 29 mmol/L    Glucose 107 70 - 110 mg/dL    BUN, Bld 12 6 - 20 mg/dL    Creatinine 0.9 0.5 - 1.4 mg/dL    Calcium 9.7 8.7 - 10.5 mg/dL    Total Protein 7.2 6.0 - 8.4 g/dL    Albumin 3.0 (L) 3.5 - 5.2 g/dL    Total Bilirubin 3.9 (H) 0.1 - 1.0 mg/dL    Alkaline Phosphatase 238 (H) 55 - 135 U/L    AST 53 (H) 10 - 40 U/L    ALT 54 (H) 10 - 44 U/L    Anion Gap 11 8 - 16 mmol/L    eGFR if African American >60.0 >60 mL/min/1.73 m^2    eGFR if non African American >60.0 >60 mL/min/1.73 m^2   Phosphorus    Collection Time: 07/30/17  6:00 AM   Result Value Ref Range    Phosphorus 4.1 2.7 - 4.5 mg/dL   Magnesium    Collection Time: 07/30/17  6:00 AM   Result Value Ref Range    Magnesium 1.9 1.6 - 2.6 mg/dL   CBC auto differential    Collection Time: 07/30/17  6:00 AM   Result Value Ref Range    WBC 8.04 3.90 - 12.70 K/uL    RBC 3.78 (L) 4.00 - 5.40 M/uL    Hemoglobin 11.8 (L) 12.0 - 16.0 g/dL    Hematocrit 34.8 (L) 37.0 - 48.5 %    MCV 92 82 - 98 fL    MCH 31.2 (H) 27.0 - 31.0 pg    MCHC 33.9 32.0 - 36.0 g/dL    RDW 15.6 (H) 11.5 - 14.5 %    Platelets 187 150 - 350 K/uL    MPV 10.1 9.2 - 12.9 fL    Gran # 3.9 1.8 - 7.7 K/uL    Lymph # 3.1 1.0 - 4.8 K/uL    Mono # 0.7 0.3 - 1.0 K/uL    Eos # 0.3 0.0 - 0.5 K/uL    Baso #  0.08 0.00 - 0.20 K/uL    Gran% 48.8 38.0 - 73.0 %    Lymph% 38.2 18.0 - 48.0 %    Mono% 8.1 4.0 - 15.0 %    Eosinophil% 3.7 0.0 - 8.0 %    Basophil% 1.0 0.0 - 1.9 %    Differential Method Automated    Ammonia    Collection Time: 07/30/17  6:30 AM   Result Value Ref Range    Ammonia 26 10 - 50 umol/L   POCT glucose    Collection Time: 07/30/17 11:59 AM   Result Value Ref Range    POCT Glucose 117 (H) 70 - 110 mg/dL         Diagnostic Results:  No new imaging overnight        Dexter Miller MD  Comprehensive Stroke Center  Department of Vascular Neurology   [facility]

## 2017-07-30 NOTE — PT/OT/SLP PROGRESS
Occupational Therapy  Treatment    Malina Robles   MRN: [id number]   Admitting Diagnosis: Nontraumatic intracerebral hemorrhage in hemisphere, unspecified    OT Date of Treatment: 07/30/17   OT Start Time: 0953  OT Stop Time: 1039  OT Total Time (min): 46 min    Billable Minutes:  Self Care/Home Management 36 and Therapeutic Exercise 10    General Precautions: Standard, aspiration, fall, honey thick, pureed diet, seizure, aphasia (cardiac)        Subjective:  Communicated with RN prior to session.  Pt with no verbalizations however shook head yes throughout session to questions.  Pain/Comfort  Pain Rating 1: 0/10  Pain Rating Post-Intervention 1: 0/10 (no indication of pain during session)    Objective:  Patient found with: telemetry, bed alarm, peripheral IV     Functional Mobility:  Bed Mobility:  Rolling/Turning to Left: Total assistance  Rolling/Turning Right: Total assistance  Scooting/Bridging: Total Assistance (scooting forward & to the right along EOB)  Supine to Sit: Total Assistance  Sit to Supine: Total Assistance    Activities of Daily Living:  Feeding Level of Assistance:  (Max-Total (A) while seated EOB & supine in parts (with HOB elevated), pt required hand over hand (A) for fork however for cup once it was in hand was able to feed self thickened water with (A) only for balance)  UE Dressing Level of Assistance: Total assistance  Grooming Position: Seated, EOB  Grooming Level of Assistance: Maximum assistance  Toileting Where Assessed: Bed level  Toileting Level of Assistance: Total assistance (due to incontinent episode)     Therapeutic Activities and Exercises:  Pt followed no verbal commands however followed directions for grooming with demonstration & some hand over hand (A).  Pt required Min-Mod (A) with postural control while seated EOB due to posterior & rightward leaning. Provided RUE PROM in all planes x 10 reps each while supine with gentle stretch at end ranges due to increased tone.   "Provided towel roll of right hand due to fisting.  Provide elevated positioning of RUE to provide approximation of shoulder.  Pt required cues for pocketing & to clear mouth throughout eating time during session.    AM-PAC 6 CLICK ADL   How much help from another person does this patient currently need?   1 = Unable, Total/Dependent Assistance  2 = A lot, Maximum/Moderate Assistance  3 = A little, Minimum/Contact Guard/Supervision  4 = None, Modified [place]/Independent    Putting on and taking off regular lower body clothing? : 1  Bathing (including washing, rinsing, drying)?: 1  Toileting, which includes using toilet, bedpan, or urinal? : 1  Putting on and taking off regular upper body clothing?: 1  Taking care of personal grooming such as brushing teeth?: 2  Eating meals?: 2  Total Score: 8     AM-PAC Raw Score CMS "G-Code Modifier Level of Impairment Assistance   6 % Total / Unable   7 - 8 CM 80 - 100% Maximal Assist   9-13 CL 60 - 80% Moderate Assist   14 - 19 CK 40 - 60% Moderate Assist   20 - 22 CJ 20 - 40% Minimal Assist   23 CI 1-20% SBA / CGA   24 CH 0% Independent/ Mod I       Patient left supine with all lines intact, call button in reach, bed alarm on, RN notified, PCT present and white board updated.    ASSESSMENT:  Malina Robles is a 57 y.o. female with a medical diagnosis of Nontraumatic intracerebral hemorrhage in hemisphere, unspecified and presents with fair participation and motivation.  Pt with good alertness throughout session.  Pt continues to require extensive (A) with all activities.    Rehab identified problem list/impairments: Rehab identified problem list/impairments: weakness, impaired endurance, impaired sensation, impaired self care skills, impaired balance, impaired functional mobilty, impaired cognition, decreased coordination, decreased upper extremity function, decreased lower extremity function, decreased safety awareness    Rehab potential is fair.    Activity " tolerance: Fair    Discharge recommendations: Discharge Facility/Level Of Care Needs: nursing facility, skilled     GOALS:    Occupational Therapy Goals        Problem: Occupational Therapy Goal    Goal Priority Disciplines Outcome Interventions   Occupational Therapy Goal     OT, PT/OT Ongoing (interventions implemented as appropriate)    Description:  Goals to be met by: 8/11/17     Patient will increase functional independence with ADLs by performing:    Eating with Moderate assistance.  UE Dressing with Maximum Assistance.  LE Dressing with Maximum Assistance.  Grooming while seated with Moderate Assistance.  Toileting from bedside commode with Maximum Assistance for hygiene and clothing management.   Sitting at edge of bed x15 minutes with Contact Guard Assistance.  Rolling to Bilateral with Minimal Assistance.   Supine to sit with Moderate Assistance.  Stand pivot transfers with Max Assistance.  Pt will follow 3/4 one step commands.                       Plan:  Patient to be seen 5 x/week to address the above listed problems via cognitive retraining, self-care/home management, sensory integration, therapeutic activities, therapeutic exercises, neuromuscular re-education  Plan of Care expires: 08/27/17  Plan of Care reviewed with: patient         KAEL Lambert  07/30/2017

## 2017-07-30 NOTE — SUBJECTIVE & OBJECTIVE
Neurologic Chief Complaint: R Temporal ICH    Subjective:     Interval History: Patient is seen for follow-up neurological assessment and treatment recommendations:     No acute events overnight.    Spoke with Dr. Birch with GI, recommends checking in with GI team tomorrow to discuss plan as far as ERCP, need for PEG at this point (tolerating PO pureed diet).      HPI, Past Medical, Family, and Social History remains the same as documented in the initial encounter.     Review of Systems   Unable to perform ROS: Patient nonverbal     Scheduled Meds:   amlodipine  10 mg Oral Daily    aspirin  81 mg Oral Daily    famotidine  20 mg Oral BID    heparin (porcine)  5,000 Units Subcutaneous Q12H    hydrochlorothiazide  12.5 mg Oral Daily    lactulose  10 g Oral TID    levothyroxine  25 mcg Oral Before breakfast    lisinopril  40 mg Oral Daily    piperacillin-tazobactam 4.5 g in dextrose 5 % 100 mL IVPB (ready to mix system)  4.5 g Intravenous Q8H    pravastatin  40 mg Oral QHS    sodium chloride 0.9%  10 mL Intravenous Q6H    sodium chloride 0.9%  3 mL Intravenous Q8H    sodium chloride  1 g Oral TID     Continuous Infusions:   PRN Meds:fentaNYL, hydrALAZINE, labetalol, lorazepam, Flushing PICC Protocol **AND** sodium chloride 0.9% **AND** sodium chloride 0.9%    Objective:     Vital Signs (Most Recent):  Temp: 97.1 °F (36.2 °C) (07/30/17 0800)  Pulse: 94 (07/30/17 1100)  Resp: 18 (07/30/17 0800)  BP: 125/83 (07/30/17 0800)  SpO2: 100 % (07/30/17 0800)  BP Location: Left arm    Vital Signs Range (Last 24H):  Temp:  [97.1 °F (36.2 °C)-98.7 °F (37.1 °C)]   Pulse:  [66-94]   Resp:  [18]   BP: (125-138)/(83-89)   SpO2:  [99 %-100 %]   BP Location: Left arm    Physical Exam   Constitutional: She appears well-developed and well-nourished. No distress.   Cardiovascular: Normal rate and regular rhythm.  Exam reveals no friction rub.    No murmur heard.  Pulmonary/Chest: Effort normal and breath sounds normal. No  respiratory distress. She has no wheezes.   Abdominal: Soft. Bowel sounds are normal. She exhibits no distension. There is no tenderness.   Neurological: She is alert.       Neurological Exam:   LOC: alert and follows few requests  Language: Able to state her name  Speech: Mild dysarthria    NIH Stroke Scale:    Level of Consciousness: 0 - alert  LOC Questions: 2 - answers none correctly  LOC Commands: 2 - performs neither correctly  Best Gaze: 0 - normal  Visual: 0 - no visual loss  Facial Palsy: 0 - normal  Motor Left Arm: 1 - drift  Motor Right Arm: 1 - drift  Motor Left Le - no drift  Motor Right Le - no drift  Limb Ataxia: 0 - absent  Sensory: 0 - normal  Best Language: 3 - mute  Dysarthria: 1 - mild to moderate dysarthria  Extinction and Inattention: 0 - no neglect  NIH Stroke Scale Total: 10      Laboratory:  Recent Results (from the past 24 hour(s))   POCT glucose    Collection Time: 17 11:29 PM   Result Value Ref Range    POCT Glucose 86 70 - 110 mg/dL   POCT glucose    Collection Time: 17  5:49 AM   Result Value Ref Range    POCT Glucose 99 70 - 110 mg/dL   Amylase    Collection Time: 17  6:00 AM   Result Value Ref Range    Amylase 59 20 - 110 U/L   Lipase    Collection Time: 17  6:00 AM   Result Value Ref Range    Lipase 95 (H) 4 - 60 U/L   Comprehensive metabolic panel    Collection Time: 17  6:00 AM   Result Value Ref Range    Sodium 138 136 - 145 mmol/L    Potassium 3.3 (L) 3.5 - 5.1 mmol/L    Chloride 105 95 - 110 mmol/L    CO2 22 (L) 23 - 29 mmol/L    Glucose 107 70 - 110 mg/dL    BUN, Bld 12 6 - 20 mg/dL    Creatinine 0.9 0.5 - 1.4 mg/dL    Calcium 9.7 8.7 - 10.5 mg/dL    Total Protein 7.2 6.0 - 8.4 g/dL    Albumin 3.0 (L) 3.5 - 5.2 g/dL    Total Bilirubin 3.9 (H) 0.1 - 1.0 mg/dL    Alkaline Phosphatase 238 (H) 55 - 135 U/L    AST 53 (H) 10 - 40 U/L    ALT 54 (H) 10 - 44 U/L    Anion Gap 11 8 - 16 mmol/L    eGFR if African American >60.0 >60 mL/min/1.73 m^2     eGFR if non African American >60.0 >60 mL/min/1.73 m^2   Phosphorus    Collection Time: 07/30/17  6:00 AM   Result Value Ref Range    Phosphorus 4.1 2.7 - 4.5 mg/dL   Magnesium    Collection Time: 07/30/17  6:00 AM   Result Value Ref Range    Magnesium 1.9 1.6 - 2.6 mg/dL   CBC auto differential    Collection Time: 07/30/17  6:00 AM   Result Value Ref Range    WBC 8.04 3.90 - 12.70 K/uL    RBC 3.78 (L) 4.00 - 5.40 M/uL    Hemoglobin 11.8 (L) 12.0 - 16.0 g/dL    Hematocrit 34.8 (L) 37.0 - 48.5 %    MCV 92 82 - 98 fL    MCH 31.2 (H) 27.0 - 31.0 pg    MCHC 33.9 32.0 - 36.0 g/dL    RDW 15.6 (H) 11.5 - 14.5 %    Platelets 187 150 - 350 K/uL    MPV 10.1 9.2 - 12.9 fL    Gran # 3.9 1.8 - 7.7 K/uL    Lymph # 3.1 1.0 - 4.8 K/uL    Mono # 0.7 0.3 - 1.0 K/uL    Eos # 0.3 0.0 - 0.5 K/uL    Baso # 0.08 0.00 - 0.20 K/uL    Gran% 48.8 38.0 - 73.0 %    Lymph% 38.2 18.0 - 48.0 %    Mono% 8.1 4.0 - 15.0 %    Eosinophil% 3.7 0.0 - 8.0 %    Basophil% 1.0 0.0 - 1.9 %    Differential Method Automated    Ammonia    Collection Time: 07/30/17  6:30 AM   Result Value Ref Range    Ammonia 26 10 - 50 umol/L   POCT glucose    Collection Time: 07/30/17 11:59 AM   Result Value Ref Range    POCT Glucose 117 (H) 70 - 110 mg/dL         Diagnostic Results:  No new imaging overnight

## 2017-07-30 NOTE — PLAN OF CARE
Problem: Occupational Therapy Goal  Goal: Occupational Therapy Goal  Goals to be met by: 8/11/17     Patient will increase functional independence with ADLs by performing:    UE Dressing with Maximum Assistance.  LE Dressing with Maximum Assistance.  Grooming while seated with Moderate Assistance.  Toileting from bedside commode with Maximum Assistance for hygiene and clothing management.   Sitting at edge of bed x15 minutes with Contact Guard Assistance.  Rolling to Bilateral with Minimal Assistance.   Supine to sit with Moderate Assistance.  Stand pivot transfers with Max Assistance.  Pt will follow 3/4 one step commands.     Outcome: Ongoing (interventions implemented as appropriate)  Goals remain appropriate

## 2017-07-30 NOTE — ASSESSMENT & PLAN NOTE
- Temporary stent in place, biliary system decompressed with improved hepatic function  - Zosyn (initially started in neuro ICU for leukocytosis with end date of 7/25; then continued on 7/27, 7/28 and onwards in vascular neurology notes for calculus of bile duct without cholecystitis, w/ obstruction)   - Will discuss continued need for zosyn with GI/team tomorrow, as well as discussion regarding f/u ERCP +/- PEG.

## 2017-07-30 NOTE — ASSESSMENT & PLAN NOTE
ST recommending Pureed/honey-thick  7/29 - RN reports pt ate ~70% of meal  -Reeval need for any PEG

## 2017-07-31 ENCOUNTER — TELEPHONE (OUTPATIENT)
Dept: GASTROENTEROLOGY | Facility: HOSPITAL | Age: 57
End: 2017-07-31

## 2017-07-31 ENCOUNTER — TELEPHONE (OUTPATIENT)
Dept: GASTROENTEROLOGY | Facility: CLINIC | Age: 57
End: 2017-07-31

## 2017-07-31 VITALS
RESPIRATION RATE: 17 BRPM | BODY MASS INDEX: 18.85 KG/M2 | TEMPERATURE: 98 F | SYSTOLIC BLOOD PRESSURE: 120 MMHG | HEART RATE: 97 BPM | WEIGHT: 110.44 LBS | HEIGHT: 64 IN | OXYGEN SATURATION: 98 % | DIASTOLIC BLOOD PRESSURE: 76 MMHG

## 2017-07-31 DIAGNOSIS — K80.70 CHOLELITHIASIS WITH CHOLEDOCHOLITHIASIS: Primary | ICD-10-CM

## 2017-07-31 LAB
ALBUMIN SERPL BCP-MCNC: 2.8 G/DL
ALP SERPL-CCNC: 202 U/L
ALT SERPL W/O P-5'-P-CCNC: 53 U/L
AMMONIA PLAS-SCNC: 27 UMOL/L
AMYLASE SERPL-CCNC: 63 U/L
ANION GAP SERPL CALC-SCNC: 16 MMOL/L
AST SERPL-CCNC: 51 U/L
BASOPHILS # BLD AUTO: 0.12 K/UL
BASOPHILS NFR BLD: 1.3 %
BILIRUB SERPL-MCNC: 3.3 MG/DL
BUN SERPL-MCNC: 13 MG/DL
CALCIUM SERPL-MCNC: 8.9 MG/DL
CHLORIDE SERPL-SCNC: 102 MMOL/L
CO2 SERPL-SCNC: 21 MMOL/L
CREAT SERPL-MCNC: 1.1 MG/DL
DIFFERENTIAL METHOD: ABNORMAL
EOSINOPHIL # BLD AUTO: 0.3 K/UL
EOSINOPHIL NFR BLD: 3.8 %
ERYTHROCYTE [DISTWIDTH] IN BLOOD BY AUTOMATED COUNT: 15.7 %
EST. GFR  (AFRICAN AMERICAN): >60 ML/MIN/1.73 M^2
EST. GFR  (NON AFRICAN AMERICAN): 56.7 ML/MIN/1.73 M^2
GLUCOSE SERPL-MCNC: 305 MG/DL
HCT VFR BLD AUTO: 33.8 %
HGB BLD-MCNC: 11.5 G/DL
INR PPP: 1.1
LIPASE SERPL-CCNC: 94 U/L
LYMPHOCYTES # BLD AUTO: 3.9 K/UL
LYMPHOCYTES NFR BLD: 42.9 %
MAGNESIUM SERPL-MCNC: 1.5 MG/DL
MCH RBC QN AUTO: 31.4 PG
MCHC RBC AUTO-ENTMCNC: 34 G/DL
MCV RBC AUTO: 92 FL
MONOCYTES # BLD AUTO: 0.8 K/UL
MONOCYTES NFR BLD: 9.1 %
NEUTROPHILS # BLD AUTO: 3.8 K/UL
NEUTROPHILS NFR BLD: 42.6 %
PHOSPHATE SERPL-MCNC: 4.3 MG/DL
PLATELET # BLD AUTO: 173 K/UL
PMV BLD AUTO: 9.7 FL
POCT GLUCOSE: 88 MG/DL (ref 70–110)
POCT GLUCOSE: 92 MG/DL (ref 70–110)
POCT GLUCOSE: 97 MG/DL (ref 70–110)
POTASSIUM SERPL-SCNC: 3.6 MMOL/L
PROT SERPL-MCNC: 7 G/DL
PROTHROMBIN TIME: 11.5 SEC
RBC # BLD AUTO: 3.66 M/UL
SODIUM SERPL-SCNC: 139 MMOL/L
WBC # BLD AUTO: 9.02 K/UL

## 2017-07-31 PROCEDURE — 83735 ASSAY OF MAGNESIUM: CPT

## 2017-07-31 PROCEDURE — 83690 ASSAY OF LIPASE: CPT

## 2017-07-31 PROCEDURE — 36415 COLL VENOUS BLD VENIPUNCTURE: CPT

## 2017-07-31 PROCEDURE — 82150 ASSAY OF AMYLASE: CPT

## 2017-07-31 PROCEDURE — 92507 TX SP LANG VOICE COMM INDIV: CPT

## 2017-07-31 PROCEDURE — 63600175 PHARM REV CODE 636 W HCPCS: Performed by: NURSE PRACTITIONER

## 2017-07-31 PROCEDURE — 25000003 PHARM REV CODE 250: Performed by: NURSE PRACTITIONER

## 2017-07-31 PROCEDURE — 80053 COMPREHEN METABOLIC PANEL: CPT

## 2017-07-31 PROCEDURE — 25000003 PHARM REV CODE 250: Performed by: INTERNAL MEDICINE

## 2017-07-31 PROCEDURE — A4216 STERILE WATER/SALINE, 10 ML: HCPCS | Performed by: NURSE PRACTITIONER

## 2017-07-31 PROCEDURE — 82140 ASSAY OF AMMONIA: CPT

## 2017-07-31 PROCEDURE — 99233 SBSQ HOSP IP/OBS HIGH 50: CPT | Mod: ,,, | Performed by: PSYCHIATRY & NEUROLOGY

## 2017-07-31 PROCEDURE — 84100 ASSAY OF PHOSPHORUS: CPT

## 2017-07-31 PROCEDURE — 85610 PROTHROMBIN TIME: CPT

## 2017-07-31 PROCEDURE — 97530 THERAPEUTIC ACTIVITIES: CPT

## 2017-07-31 PROCEDURE — G8998 SWALLOW D/C STATUS: HCPCS | Mod: CL

## 2017-07-31 PROCEDURE — 92526 ORAL FUNCTION THERAPY: CPT

## 2017-07-31 PROCEDURE — 25000003 PHARM REV CODE 250: Performed by: STUDENT IN AN ORGANIZED HEALTH CARE EDUCATION/TRAINING PROGRAM

## 2017-07-31 PROCEDURE — 85025 COMPLETE CBC W/AUTO DIFF WBC: CPT

## 2017-07-31 RX ORDER — CIPROFLOXACIN 500 MG/1
500 TABLET ORAL EVERY 12 HOURS
Status: DISCONTINUED | OUTPATIENT
Start: 2017-07-31 | End: 2017-07-31 | Stop reason: HOSPADM

## 2017-07-31 RX ORDER — PRAVASTATIN SODIUM 40 MG/1
40 TABLET ORAL NIGHTLY
Qty: 60 TABLET | Refills: 2 | Status: ON HOLD | OUTPATIENT
Start: 2017-07-31 | End: 2019-01-04 | Stop reason: HOSPADM

## 2017-07-31 RX ORDER — HYDROCHLOROTHIAZIDE 12.5 MG/1
12.5 TABLET ORAL DAILY
Qty: 30 TABLET | Refills: 3 | Status: ON HOLD | OUTPATIENT
Start: 2017-07-31 | End: 2019-01-04 | Stop reason: HOSPADM

## 2017-07-31 RX ORDER — AMLODIPINE BESYLATE 10 MG/1
10 TABLET ORAL DAILY
Qty: 30 TABLET | Refills: 2 | Status: ON HOLD | OUTPATIENT
Start: 2017-07-31 | End: 2019-06-04 | Stop reason: HOSPADM

## 2017-07-31 RX ORDER — CIPROFLOXACIN 500 MG/1
500 TABLET ORAL EVERY 12 HOURS
Qty: 20 TABLET | Refills: 0 | Status: SHIPPED | OUTPATIENT
Start: 2017-07-31 | End: 2017-08-10

## 2017-07-31 RX ORDER — FAMOTIDINE 20 MG/1
20 TABLET, FILM COATED ORAL DAILY
Qty: 60 TABLET | Refills: 2 | Status: SHIPPED | OUTPATIENT
Start: 2017-07-31 | End: 2017-07-31 | Stop reason: HOSPADM

## 2017-07-31 RX ADMIN — Medication 10 ML: at 12:07

## 2017-07-31 RX ADMIN — SODIUM CHLORIDE TAB 1 GM 1 G: 1 TAB at 03:07

## 2017-07-31 RX ADMIN — HYDROCHLOROTHIAZIDE 12.5 MG: 12.5 TABLET ORAL at 09:07

## 2017-07-31 RX ADMIN — HEPARIN SODIUM 5000 UNITS: 5000 INJECTION, SOLUTION INTRAVENOUS; SUBCUTANEOUS at 09:07

## 2017-07-31 RX ADMIN — LISINOPRIL 40 MG: 20 TABLET ORAL at 09:07

## 2017-07-31 RX ADMIN — FAMOTIDINE 20 MG: 20 TABLET, FILM COATED ORAL at 09:07

## 2017-07-31 RX ADMIN — CIPROFLOXACIN HYDROCHLORIDE 500 MG: 500 TABLET, FILM COATED ORAL at 09:07

## 2017-07-31 RX ADMIN — LACTULOSE 10 G: 20 SOLUTION ORAL at 03:07

## 2017-07-31 RX ADMIN — ASPIRIN 81 MG CHEWABLE TABLET 81 MG: 81 TABLET CHEWABLE at 09:07

## 2017-07-31 RX ADMIN — AMLODIPINE BESYLATE 10 MG: 10 TABLET ORAL at 09:07

## 2017-07-31 NOTE — PLAN OF CARE
F/u phone call to May at [facility] for update on pt's return, CM asked to call back at 1:30pm.    Lyudmila Yoder RN  Case Management  [id number]

## 2017-07-31 NOTE — SUBJECTIVE & OBJECTIVE
Interval History:     Review of patient's allergies indicates:  No Known Allergies  Current Facility-Administered Medications   Medication Frequency    amlodipine tablet 10 mg Daily    aspirin chewable tablet 81 mg Daily    famotidine tablet 20 mg BID    fentaNYL injection 50 mcg Q2H PRN    heparin (porcine) injection 5,000 Units Q12H    hydrALAZINE injection 10 mg Q4H PRN    hydrochlorothiazide tablet 12.5 mg Daily    labetalol injection 10 mg Q6H PRN    lactulose 20 gram/30 mL solution Soln 10 g TID    levothyroxine tablet 25 mcg Before breakfast    lisinopril tablet 40 mg Daily    lorazepam tablet 0.5 mg On Call Procedure    piperacillin-tazobactam 4.5 g in dextrose 5 % 100 mL IVPB (ready to mix system) Q8H    pravastatin tablet 40 mg QHS    sodium chloride 0.9% flush 10 mL Q6H    And    sodium chloride 0.9% flush 10 mL PRN    sodium chloride 0.9% flush 3 mL Q8H    sodium chloride tablet 1 g TID       Objective:     Vital Signs (Most Recent):  Temp: 98.4 °F (36.9 °C) (07/30/17 1905)  Pulse: 68 (07/30/17 2300)  Resp: 14 (07/30/17 1905)  BP: 134/86 (07/30/17 1905)  SpO2: 98 % (07/30/17 1905)  O2 Device (Oxygen Therapy): room air (07/30/17 1155) Vital Signs (24h Range):  Temp:  [97.1 °F (36.2 °C)-98.4 °F (36.9 °C)] 98.4 °F (36.9 °C)  Pulse:  [68-98] 68  Resp:  [14-18] 14  SpO2:  [98 %-100 %] 98 %  BP: (113-134)/(74-86) 134/86     Weight: 50.1 kg (110 lb 7.2 oz) (07/25/17 0400)  Body mass index is 18.96 kg/m².  Body surface area is 1.5 meters squared.    I/O last 3 completed shifts:  In: 1793 [P.O.:1280; I.V.:13; IV Piggyback:500]  Out: 3 [Urine:3]    Physical Exam   Constitutional: She appears well-developed and well-nourished. No distress.   Cardiovascular: Normal rate and regular rhythm.  Exam reveals no friction rub.    No murmur heard.  Pulmonary/Chest: Effort normal and breath sounds normal. No respiratory distress. She has no wheezes.   Abdominal: Soft. Bowel sounds are normal. She exhibits  no distension. There is no tenderness.   Neurological: She is alert.       Significant Labs:  ABGs: No results for input(s): PH, PCO2, HCO3, POCSATURATED, BE in the last 168 hours.  CBC:   Recent Labs  Lab 07/31/17 0429   WBC 9.02   RBC 3.66*   HGB 11.5*   HCT 33.8*      MCV 92   MCH 31.4*   MCHC 34.0     CMP:   Recent Labs  Lab 07/31/17 0429   *   CALCIUM 8.9   ALBUMIN 2.8*   PROT 7.0      K 3.6   CO2 21*      BUN 13   CREATININE 1.1   ALKPHOS 202*   ALT 53*   AST 51*   BILITOT 3.3*     Coagulation:   Recent Labs  Lab 07/31/17 0429   INR 1.1     LFTs:   Recent Labs  Lab 07/31/17 0429   ALT 53*   AST 51*   ALKPHOS 202*   BILITOT 3.3*   PROT 7.0   ALBUMIN 2.8*     Microbiology Results (last 7 days)     Procedure Component Value Units Date/Time    Blood culture [[id number]] Collected:  07/19/17 1505    Order Status:  Completed Specimen:  Blood from Peripheral, Hand, Left Updated:  07/24/17 1812     Blood Culture, Routine No growth after 5 days.    Narrative:       Blood cultures x 2 different sites. 4 bottles total. Please  draw cultures before administering antibiotics.    Blood culture [[id number]] Collected:  07/19/17 1455    Order Status:  Completed Specimen:  Blood from Peripheral, Forearm, Left Updated:  07/24/17 1812     Blood Culture, Routine No growth after 5 days.    Narrative:       Blood cultures from 2 different sites. 4 bottles total.  Please draw before starting antibiotics.    Culture, Respiratory with Gram Stain [[id number]] Collected:  07/22/17 1423    Order Status:  Completed Specimen:  Respiratory from Endotracheal Aspirate Updated:  07/24/17 1035     Respiratory Culture Normal respiratory adair     Gram Stain (Respiratory) Moderate WBC's     Gram Stain (Respiratory) No organisms seen    Narrative:       Mini-BAL.        PTH: No results for input(s): PTH in the last 168 hours.  TSH: No results for input(s): TSH in the last 168 hours.  All labs within the past 24 hours  have been reviewed.     Significant Imaging:  Labs: Reviewed  MRI: Reviewed

## 2017-07-31 NOTE — PLAN OF CARE
Problem: Physical Therapy Goal  Goal: Physical Therapy Goal  Goals to be met by: 8/3/2017     Patient will increase functional independence with mobility by performin. Pt will participate in bilateral UE and LE ROM exercises on 3 consecutive visits with no change in vitals.   2. Pt will perform rolling R and L with moderate assistance   3. Supine to sit with moderate assistance   4. Sit to supine with moderate assistance   5.  Pt will tolerate sitting EOB x 10 minutes with moderate assistance       Outcome: Unable to achieve outcome(s) by discharge  Pt being discharged today.  She is not able to meaningfully participate in therapy at this time.

## 2017-07-31 NOTE — PT/OT/SLP PROGRESS
"Physical Therapy  Treatment and Discharge    Malina Robles   MRN: [id number]   Admitting Diagnosis: Nontraumatic intracerebral hemorrhage in hemisphere, unspecified    PT Received On: 07/31/17  PT Start Time: 1035     PT Stop Time: 1101    PT Total Time (min): 26 min       Billable Minutes:  Therapeutic Activity 26    Treatment Type: Treatment  PT/PTA: PT     PTA Visit Number: 2       General Precautions: Standard, aspiration, fall, NPO, seizure (cardiac)    Subjective:  Communicated with Rn prior to session.    "I need to get my money first to buy cigarettes and walk the dog."    Pain/Comfort  Pain Rating 1:  (Pt unable to rate 2* cognitive status)    Objective:   Patient found with: bed alarm  Patient found supine in bed asleep.  Patient did not open eyes or acknowledge therapist in the room evel with tactile cues.  Therapist and rehab tech performed supine to sit transfer dependently with draw sheet to improve level of alertness.  Patient opened eyes upon sitting.  Therapist engaged patient in activities in sitting to attempt to maximize patient participation.  Patient sat EOB 20 minutes with max assistance for sitting balance.  Pt inconsistently participated as bilateral LE knee extensions performed 10x, reaching task with cones 5x with hand over hand assistance and washing face with hand over hand assistance.  Pt voluntarily performed cervical extension to lift head 10x throughout session, but unable to reproduce movement on command.  MDs rounded during therapy with patient turning head to look at them, but no verbalization made.  Therapist returned patient to supine with total assist of 2 people.  Patient maintained eyes open 50% of session when sitting.  Eyes closed 100% of the time in supine.        AM-PAC 6 CLICK MOBILITY  How much help from another person does this patient currently need?   1 = Unable, Total/Dependent Assistance  2 = A lot, Maximum/Moderate Assistance  3 = A little, Minimum/Contact " Guard/Supervision  4 = None, Modified [place]/Independent    Turning over in bed (including adjusting bedclothes, sheets and blankets)?: 1  Sitting down on and standing up from a chair with arms (e.g., wheelchair, bedside commode, etc.): 1  Moving from lying on back to sitting on the side of the bed?: 1  Moving to and from a bed to a chair (including a wheelchair)?: 1  Need to walk in hospital room?: 1  Climbing 3-5 steps with a railing?: 1  Total Score: 6    AM-PAC Raw Score CMS G-Code Modifier Level of Impairment Assistance   6 % Total / Unable   7 - 9 CM 80 - 100% Maximal Assist   10 - 14 CL 60 - 80% Moderate Assist   15 - 19 CK 40 - 60% Moderate Assist   20 - 22 CJ 20 - 40% Minimal Assist   23 CI 1-20% SBA / CGA   24 CH 0% Independent/ Mod I     Patient left left sidelying with all lines intact, call button in reach, bed alarm on and bed rail up.    Assessment:  Malina Robles is a 57 y.o. female with a medical diagnosis of Nontraumatic intracerebral hemorrhage in hemisphere, unspecified.  Patient is minimally participating in therapy sessions.  She opens her eyes when sitting EOB but does not meaningfully participate in therapeutic or functional activities.  Patient is being discharged today.  She has not met goals and is not appropriate for therapy at this time due to minimal participation and sever cognitive deficits.     Rehab identified problem list/impairments: Rehab identified problem list/impairments: impaired functional mobilty, impaired self care skills, decreased lower extremity function, decreased upper extremity function, abnormal tone, decreased safety awareness, impaired cognition    Rehab potential is poor.    Activity tolerance: Fair    Discharge recommendations: Discharge Facility/Level Of Care Needs: nursing facility, basic     Barriers to discharge: Barriers to Discharge: None    Equipment recommendations: Equipment Needed After Discharge: none     GOALS:    Physical Therapy  Goals        Problem: Physical Therapy Goal    Goal Priority Disciplines Outcome Goal Variances Interventions   Physical Therapy Goal     PT/OT, PT Unable to achieve outcome(s) by discharge     Description:  Goals to be met by: 8/3/2017     Patient will increase functional independence with mobility by performin. Pt will participate in bilateral UE and LE ROM exercises on 3 consecutive visits with no change in vitals.   2. Pt will perform rolling R and L with moderate assistance   3. Supine to sit with moderate assistance   4. Sit to supine with moderate assistance   5.  Pt will tolerate sitting EOB x 10 minutes with moderate assistance                        PLAN:    D/c skilled PT services.   Plan of Care expires: 17  Plan of Care reviewed with: patient         Airam ORTIZ Taylor, PT  2017

## 2017-07-31 NOTE — NURSING
"PICC line removed prior to D/C. Pt was instructed to "hum" while PICC was being removed. Pressure was held for 10-15 mins. Line was intact.   "

## 2017-07-31 NOTE — NURSING
Another called made to Tonia at [facility]. Was put on hold again. Will continue to try to contact.

## 2017-07-31 NOTE — NURSING
Tried to call Tonia at [facility] 2x to give report. Was put on hold for more than 5 minutes each call. Will try to call back.

## 2017-07-31 NOTE — PLAN OF CARE
Problem: SLP Goal  Goal: SLP Goal  Speech Language Pathology Goals  Updated Goals expected to be met by 8/4    1. Pt will tolerate honey thick liquids and purees without overt s/s of aspiration   2. Pt will participate in speech language cognitive evaluation MET  3. Pt will follow 1 step commands with 40% accuracy and max cues  4. Pt will answer simple yes/no questions with 50% accuracy and max cues  5. Pt will vocalize x3 throughout session          All goals remain appropriate at this time, please re-consult if warranted.   Elyssa Brothers CCC-SLP  7/31/2017

## 2017-07-31 NOTE — PLAN OF CARE
[facility]     Department of Hospital Medicine     [address] [place] [address]     [phone] after hours  [phone] fax       NURSING HOME ORDERS    07/31/2017    Admit to Nursing Home:  Regular  Bed                                                Diagnoses:  Active Hospital Problems    Diagnosis  POA    *Nontraumatic intracerebral hemorrhage in hemisphere, unspecified [I61.2]  Yes    Dysphagia [R13.10]  Yes    Calculus of gallbladder with acute cholecystitis and obstruction [K80.01]  Yes    Encounter for intubation [Z01.818]  Not Applicable    Respiratory alkalosis [E87.3]  Unknown    Acute blood loss anemia [D62]  No    Vasogenic cerebral edema [G93.6]  Yes    Right-sided nontraumatic intracerebral hemorrhage [I61.9]  Yes    Hypothyroid [E03.9]  Yes    Essential hypertension [I10]  Yes    Calculus of bile duct without mention of cholecystitis, with obstruction [K80.51]  Yes    Cholelithiasis [K80.20]  Yes    Elevated liver enzymes [R74.8]  Yes    Jaundice [R17]  Yes      Resolved Hospital Problems    Diagnosis Date Resolved POA    Leukocytosis [D72.829] 07/27/2017 Yes    Hypokalemia [E87.6] 07/28/2017 Yes    Chronic hepatitis C with hepatic coma [B18.2] 07/27/2017 Yes    Hepatic coma/encephalopathy [K72.91] 07/27/2017 Yes       Patient is homebound due to:  Nontraumatic intracerebral hemorrhage in hemisphere, unspecified    Allergies:Review of patient's allergies indicates:  No Known Allergies    Vitals:   Every shift (Skilled Nursing patients)    Diet: Cardiac- pureed, honey thick, assistance with meals  Acitivities:     - Up in a chair each morning as tolerated   - Ambulate with assistance to bathroom   - Scheduled walks once each shift       LABS:  Per facility protocol     CMP, CBC each Tuesday weekly   PT-INR each Tuesday weekly   Pre-albumin each month for 3 months   TSH every year     Nursing Precautions:     - Aspiration  precautions:             - Total assistance with meals            -  Upright 90 degrees befor during and after meals             -  Suction at bedside          - Fall precautions per nursing home protocol   - Seizure precaution per [facility] protocol   - Decubitus precautions:        -  for positioning   - Pressure reducing foam mattress   - Turn patient every two hours. Use wedge pillows to anchor patient    CONSULTS:      Physical Therapy to evaluate and treat     Occupational Therapy to evaluate and treat     Speech Therapy  to evaluate and treat     Nutrition to evaluate and recommend diet         MISCELLANEOUS CARE:                 Routine Skin for Bedridden Patients:  Apply moisture barrier cream to all    skin folds and wet areas in perineal area daily and after baths and                           all bowel movements.      Medications: Discontinue all previous medication orders, if any. See new list below.     Malina Robles   Home Medication Instructions LINNEA:[id number]    Printed on:07/31/17 1133   Medication Information                      albuterol sulfate 90 mcg/actuation AePB  Inhale 180 mcg into the lungs every 6 (six) hours as needed. Rescue             amlodipine (NORVASC) 10 MG tablet  Take 1 tablet (10 mg total) by mouth once daily.             aspirin (ECOTRIN) 81 MG EC tablet  Take 81 mg by mouth once daily.             ciprofloxacin HCl (CIPRO) 500 MG tablet  Take 1 tablet (500 mg total) by mouth every 12 (twelve) hours. For intraabdominal coverage until ERCP             clopidogrel (PLAVIX) 75 mg tablet  Take 75 mg by mouth once daily.             docusate sodium (COLACE) 100 MG capsule  Take 100 mg by mouth 2 (two) times daily.             ergocalciferol (VITAMIN D2) 50,000 unit Cap  Take 50,000 Units by mouth every 7 days.             fish oil-omega-3 fatty acids 300-1,000 mg capsule  Take 1 g by mouth 2 (two) times daily.              fluoxetine (PROZAC) 40 MG capsule  Take  40 mg by mouth once daily.             hydrochlorothiazide (HYDRODIURIL) 12.5 MG Tab  Take 1 tablet (12.5 mg total) by mouth once daily.             IRON/FA/DHA/EPA/FAD/NADH/MV47 (ENLYTE, FERROUS GLYCINE, ORAL)  Take 30 mg by mouth once daily at 6am.             lactulose (CEPHULAC) 10 gram packet  Take 10 g by mouth 3 (three) times daily.             levothyroxine (SYNTHROID) 25 MCG tablet  Take 25 mcg by mouth once daily.             lisinopril (PRINIVIL,ZESTRIL) 20 MG tablet  Take 20 mg by mouth once daily.             nitroGLYCERIN (NITROSTAT) 0.4 MG SL tablet  Place 0.4 mg under the tongue every 5 (five) minutes as needed.             olanzapine (ZYPREXA) 20 MG tablet  Take 7.5 mg by mouth every evening.              pantoprazole (PROTONIX) 40 MG tablet  Take 40 mg by mouth once daily.             pravastatin (PRAVACHOL) 40 MG tablet  Take 1 tablet (40 mg total) by mouth every evening.             tiotropium (SPIRIVA) 18 mcg inhalation capsule  Inhale 18 mcg into the lungs once daily.             tramadol (ULTRAM) 50 mg tablet  Take 50 mg by mouth every 6 (six) hours as needed for Pain.             valproate (DEPAKENE) 250 mg/5 mL syrup  Take 1,000 mg by mouth 2 (two) times daily.                       _________________________________  Familia Emerson MD  07/31/2017

## 2017-07-31 NOTE — PLAN OF CARE
Problem: Patient Care Overview  Goal: Plan of Care Review  NAD overnight. Pt stated her name at shift 7p shift change but has not spoke another word all night. Pt does well with honey thick liquids. Pt has had several dirty pads. BS stable. PICC dressing changed. Downtime from 2a-630 am. See paper charting, thank you

## 2017-07-31 NOTE — PT/OT/SLP PROGRESS
Speech Language Pathology  Treatment    Malina Robles   MRN: [id number]   Admitting Diagnosis: Nontraumatic intracerebral hemorrhage in hemisphere, unspecified    Diet recommendations: Solid Diet Level: Puree  Liquid Diet Level: Honey Thick Feed only when awake/alert, HOB to 90 degrees, Small bites/sips, Alternating bites/sips and 1 bite/sip at a time    SLP Treatment Date: 07/31/17  Speech Start Time: 0841     Speech Stop Time: 0902     Speech Total (min): 21 min       TREATMENT BILLABLE MINUTES:  Speech Therapy Individual 10 and Treatment Swallowing Dysfunction 11    Has the patient been evaluated by SLP for swallowing? : Yes  Keep patient NPO?: No   General Precautions: Standard, aspiration, fall, honey thick, pureed diet, seizure, aphasia          Subjective:  Awake yet lethargic    Pain/Comfort  Pain Rating 1: 0/10  Pain Rating Post-Intervention 1: 0/10    Objective:     Pt asleep upon entry, but easily aroused to verbal stimuli. Pt tolerated honey thick liquids x 2 oz via cup and puree x10 without overt s/s of aspiration. Adequate oral clearance and mastication time noted. Pt did not vocalize throughout session despite max cues. Pt unable to answer basic yes/no questions or follow 1 step commands despite max cues. Pt assumed to be attempting to nod head; however pt with poor head control. SLP unable to determine if appropriately responsive to questions vs poor head control.     Assessment:  Malina Robles is a 57 y.o. female with a medical diagnosis of Nontraumatic intracerebral hemorrhage in hemisphere, unspecified and presents with Dysphagia and Aphasia.    Discharge recommendations: Discharge Facility/Level Of Care Needs: nursing facility, basic     Goals:    SLP Goals        Problem: SLP Goal    Goal Priority Disciplines Outcome   SLP Goal     SLP    Description:  Speech Language Pathology Goals  Updated Goals expected to be met by 8/4    1. Pt will tolerate honey thick liquids and purees without  overt s/s of aspiration   2. Pt will participate in speech language cognitive evaluation MET  3. Pt will follow 1 step commands with 40% accuracy and max cues  4. Pt will answer simple yes/no questions with 50% accuracy and max cues  5. Pt will vocalize x3 throughout session                            Plan:   Patient to be seen Therapy Frequency: 5 x/week   Plan of Care expires: 08/24/17  Plan of Care reviewed with: patient  SLP Follow-up?: Yes              Elyssa Brothers CCC-SLP   Speech Language Pathologist  Pager [phone]  07/31/2017

## 2017-07-31 NOTE — PT/OT/SLP PROGRESS
Occupational Therapy      Malina Robles  MRN: [id number]    Patient not seen today secondary to Other (Comment) (Per PT pt is not appropriate for 2 therapies at this time and pt D/C to [facility] on this date). Writing therapist attempted pt this AM, but pt not appropriate for 2 therapy sessions at this time and pt D/C from [facility] on this date. Will follow-up as scheduled.    Nic Acharya, OT  7/31/2017

## 2017-07-31 NOTE — SUBJECTIVE & OBJECTIVE
Neurologic Chief Complaint:     Subjective:     Interval History: Patient is seen for follow-up neurological assessment and treatment recommendations:     HPI, Past Medical, Family, and Social History remains the same as documented in the initial encounter.     Review of Systems   Unable to perform ROS: Patient nonverbal     Scheduled Meds:   amlodipine  10 mg Oral Daily    aspirin  81 mg Oral Daily    famotidine  20 mg Oral BID    heparin (porcine)  5,000 Units Subcutaneous Q12H    hydrochlorothiazide  12.5 mg Oral Daily    lactulose  10 g Oral TID    levothyroxine  25 mcg Oral Before breakfast    lisinopril  40 mg Oral Daily    piperacillin-tazobactam 4.5 g in dextrose 5 % 100 mL IVPB (ready to mix system)  4.5 g Intravenous Q8H    pravastatin  40 mg Oral QHS    sodium chloride 0.9%  10 mL Intravenous Q6H    sodium chloride 0.9%  3 mL Intravenous Q8H    sodium chloride  1 g Oral TID     Continuous Infusions:   PRN Meds:fentaNYL, hydrALAZINE, labetalol, lorazepam, Flushing PICC Protocol **AND** sodium chloride 0.9% **AND** sodium chloride 0.9%    Objective:     Vital Signs (Most Recent):  Temp: 98.4 °F (36.9 °C) (07/30/17 1905)  Pulse: 68 (07/30/17 2300)  Resp: 14 (07/30/17 1905)  BP: 134/86 (07/30/17 1905)  SpO2: 98 % (07/30/17 1905)  BP Location: Left arm    Vital Signs Range (Last 24H):  Temp:  [97.1 °F (36.2 °C)-98.4 °F (36.9 °C)]   Pulse:  [68-98]   Resp:  [14-18]   BP: (113-134)/(74-86)   SpO2:  [98 %-100 %]   BP Location: Left arm    Physical Exam   Constitutional: She appears well-developed and well-nourished. No distress.   HENT:   Head: Normocephalic and atraumatic.   Mouth/Throat: No oropharyngeal exudate.   Eyes: Pupils are equal, round, and reactive to light. Left eye exhibits no discharge. No scleral icterus.   Neck: Normal range of motion. Neck supple. No tracheal deviation present. No thyromegaly present.   Cardiovascular: Normal rate and regular rhythm.  Exam reveals no friction rub.     No murmur heard.  Pulmonary/Chest: Effort normal and breath sounds normal. No respiratory distress. She has no wheezes.   Abdominal: Soft. Bowel sounds are normal. She exhibits no distension. There is no tenderness.   Musculoskeletal: Normal range of motion. She exhibits no edema, tenderness or deformity.   Neurological: She is alert. She has normal reflexes. She displays normal reflexes. No cranial nerve deficit. Coordination normal.   R arm contracted across chest   Skin: Capillary refill takes 2 to 3 seconds. No rash noted. No erythema. No pallor.       Neurological Exam:   LOC: alert and follows few requests  Language: Able to state her name  Speech: Mild dysarthria    NIH Stroke Scale:    Level of Consciousness: 0 - alert  LOC Questions: 2 - answers none correctly  LOC Commands: 2 - performs neither correctly  Best Gaze: 0 - normal  Visual: 0 - no visual loss  Facial Palsy: 0 - normal  Motor Left Arm: 1 - drift  Motor Right Arm: 1 - drift  Motor Left Le - no drift  Motor Right Le - no drift  Limb Ataxia: 0 - absent  Sensory: 0 - normal  Best Language: 3 - mute  Dysarthria: 1 - mild to moderate dysarthria  Extinction and Inattention: 0 - no neglect  NIH Stroke Scale Total: 10      Laboratory:  CMP:   Recent Labs  Lab 17   CALCIUM 8.9   ALBUMIN 2.8*   PROT 7.0      K 3.6   CO2 21*      BUN 13   CREATININE 1.1   ALKPHOS 202*   ALT 53*   AST 51*   BILITOT 3.3*     CBC:   Recent Labs  Lab 17   WBC 9.02   RBC 3.66*   HGB 11.5*   HCT 33.8*      MCV 92   MCH 31.4*   MCHC 34.0     Lipid Panel:   Recent Labs  Lab 17  1708   CHOL 245*   LDLCALC 172.6*   HDL 27*   TRIG 227*     Coagulation:   Recent Labs  Lab 17   INR 1.1     Platelet Aggregation Study: No results for input(s): PLTAGG, PLTAGINTERP, PLTAGREGLACO, ADPPLTAGGREG in the last 168 hours.  Hgb A1C: No results for input(s): HGBA1C in the last 168 hours.  TSH: No results for input(s): TSH in the  last 168 hours.    Diagnostic Results:  I have personally reviewed: MRI Head. Date: 7/28  Findings: multiple prior infarcts

## 2017-07-31 NOTE — PROGRESS NOTES
[facility]  Vascular Neurology  Comprehensive Stroke Center  Progress Note    Assessment/Plan:     7/19: AMS this AM. CT head shows R temporal ICH with IVH. Transferred to [facility] ED and admitted to [facility]. Of note, patient's initial INR was 4.9.     07/20: remains intubated and on cardene, patient not sedated. See by GI for bile duct stone and plans to repeat ERCP. Patient with leukocytosis-cultures pending, on vanc/zosyn.   7/21/17 Remains intubated. CTA without abnormality to explain ICH.   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 Globally aphasic. Intermittent jaw movements. 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 Pending  EEG report. Pending ERCG and PEG.   7/27/17: neuro exam stable, still not following commands; d/c keppra, repeat EEG/MBSS tomorrow; ERCP next week  7/28 - continues to be difficult to arouse in the mornings, but is more awake around noon, ordered MRI brain to assess for mass    Dysphagia    ST recommending Pureed/honey-thick  7/29 - RN reports pt ate ~70% of meal  -Reeval need for any PEG        Vasogenic cerebral edema    2/2 ICH  Evident on imaging        Hypothyroid    Continue synthroid 25 Qdaily        Calculus of bile duct without mention of cholecystitis, with obstruction    - Temporary stent in place, biliary system decompressed with improved hepatic function  - Zosyn (initially started in neuro ICU for leukocytosis with end date of 7/25; then continued on 7/27, 7/28 and onwards in vascular neurology notes for calculus of bile duct without cholecystitis, w/ obstruction)   - Transitioned to Cipro 500mg BID instead of Zosyn until outpatient ERCP, scheduling by  GI.       Essential hypertension    - Stroke risk factor  - SBP goal <140  -Lisinopril 40  -HCTZ 12.5 mg Qdaily        Elevated liver enzymes    AST/ALT improving   INR 4.9 previously; now normal at 1.0  -continue pravastatin, anticipate switch to high-dose statin after LFTs normalize        Cholelithiasis    - ERCP per [facility] as outpt, Cipro until procedure        Jaundice    Patient with history of hep C and with current bile duct stone, had stent placed at OSH  GI following  AST 43 ALT 70  t bili 4.8  Will need repeat ERCP        * Nontraumatic intracerebral hemorrhage in hemisphere, unspecified    57 y.o. female PMH hepatitis C with transaminitis, CAD, thyroid disease, and h/o CVA on ASA, plavix. CT with R temporal ICH. CTA without evidence of etiology and ECHO EF 60, normal atrial size.      7/28/17 MRI Brain limited by motion, grossly stable hemorrhage when compared to prior.    Antiplatelets: start ASA  Statins: Repeat LDL 170s; Pravastatin 40 mg (switch to high dose after LFTs improve)  SBP < 140  Diagnostics: MRI brain with contrast in 1 month, pending EEG  PT/OT/ST -> SNF  DVT ppx: TEDs, SCDs          Discharge to NH at [facility] today.     Neurologic Chief Complaint: ICH    Subjective:     Interval History: Patient is seen for follow-up neurological assessment and treatment recommendations: Nursing reports no acute events- is nonverbal, tolerating thickened feeds well and will likely not need a PEG placement. ERCP planning per [facility]; switched to Cipro.     HPI, Past Medical, Family, and Social History remains the same as documented in the initial encounter.     Review of Systems   Unable to perform ROS: Patient nonverbal     Scheduled Meds:   amlodipine  10 mg Oral Daily    aspirin  81 mg Oral Daily    famotidine  20 mg Oral BID    heparin (porcine)  5,000 Units Subcutaneous Q12H    hydrochlorothiazide  12.5 mg Oral Daily    lactulose  10 g Oral TID    levothyroxine  25 mcg Oral Before  breakfast    lisinopril  40 mg Oral Daily    piperacillin-tazobactam 4.5 g in dextrose 5 % 100 mL IVPB (ready to mix system)  4.5 g Intravenous Q8H    pravastatin  40 mg Oral QHS    sodium chloride 0.9%  10 mL Intravenous Q6H    sodium chloride 0.9%  3 mL Intravenous Q8H    sodium chloride  1 g Oral TID     Continuous Infusions:   PRN Meds:fentaNYL, hydrALAZINE, labetalol, lorazepam, Flushing PICC Protocol **AND** sodium chloride 0.9% **AND** sodium chloride 0.9%    Objective:     Vital Signs (Most Recent):  Temp: 98.4 °F (36.9 °C) (17)  Pulse: 68 (17 2300)  Resp: 14 (17)  BP: 134/86 (17)  SpO2: 98 % (17)  BP Location: Left arm    Vital Signs Range (Last 24H):  Temp:  [97.1 °F (36.2 °C)-98.4 °F (36.9 °C)]   Pulse:  [68-98]   Resp:  [14-18]   BP: (113-134)/(74-86)   SpO2:  [98 %-100 %]   BP Location: Left arm    Physical Exam   Constitutional: She appears well-developed and well-nourished. No distress.   Cardiovascular: Normal rate and regular rhythm.  Exam reveals no friction rub.    No murmur heard.  Pulmonary/Chest: Effort normal and breath sounds normal. No respiratory distress. She has no wheezes.   Abdominal: Soft. Bowel sounds are normal. She exhibits no distension. There is no tenderness.   Neurological: She is alert.       Neurological Exam:   LOC: alert and follows few requests  Language: Able to state her name  Speech: Mild dysarthria    NIH Stroke Scale:    Level of Consciousness: 0 - alert  LOC Questions: 2 - answers none correctly  LOC Commands: 2 - performs neither correctly  Best Gaze: 0 - normal  Visual: 0 - no visual loss  Facial Palsy: 0 - normal  Motor Left Arm: 1 - drift  Motor Right Arm: 1 - drift  Motor Left Le - no drift  Motor Right Le - no drift  Limb Ataxia: 0 - absent  Sensory: 0 - normal  Best Language: 3 - mute  Dysarthria: 1 - mild to moderate dysarthria  Extinction and Inattention: 0 - no neglect  NIH Stroke Scale Total:  10      Laboratory:  CMP:   Recent Labs  Lab 07/31/17  0429   CALCIUM 8.9   ALBUMIN 2.8*   PROT 7.0      K 3.6   CO2 21*      BUN 13   CREATININE 1.1   ALKPHOS 202*   ALT 53*   AST 51*   BILITOT 3.3*     CBC:   Recent Labs  Lab 07/31/17 0429   WBC 9.02   RBC 3.66*   HGB 11.5*   HCT 33.8*      MCV 92   MCH 31.4*   MCHC 34.0     Lipid Panel:   Recent Labs  Lab 07/26/17  1708   CHOL 245*   LDLCALC 172.6*   HDL 27*   TRIG 227*     Coagulation:   Recent Labs  Lab 07/31/17 0429   INR 1.1     Platelet Aggregation Study: No results for input(s): PLTAGG, PLTAGINTERP, PLTAGREGLACO, ADPPLTAGGREG in the last 168 hours.  Hgb A1C: No results for input(s): HGBA1C in the last 168 hours.  TSH: No results for input(s): TSH in the last 168 hours.    Diagnostic Results:  I have personally reviewed: MRI Head. Date: 7/28  Findings: multiple prior infarcts      Familia Emerson MD  Comprehensive Stroke Center  Department of Vascular Neurology   [facility]

## 2017-07-31 NOTE — PLAN OF CARE
Pt has been accepted to return to [facility] today.  Nurse to call report to Tonia at [phone].  [facility] transport arranged for pickup within the hour.    CM attempted to call contact Scott at [phone], number has been disconnected.  No other contact listed in chart.    Lyudmila Yoder, DEVENDRA  Case Management  [phone]

## 2017-08-01 NOTE — PT/OT/SLP DISCHARGE
Occupational Therapy Discharge Summary    Malina Robles  MRN: [id number]   Nontraumatic intracerebral hemorrhage in hemisphere, unspecified   Patient Discharged from acute Occupational Therapy on 8/1/17.  Please refer to prior OT note dated on 7/30/17 for functional status.     Assessment:   Patient has not met goals.  GOALS:    Occupational Therapy Goals     Not on file          Multidisciplinary Problems (Resolved)        Problem: Occupational Therapy Goal    Goal Priority Disciplines Outcome Interventions   Occupational Therapy Goal   (Resolved)     OT, PT/OT Outcome(s) achieved    Description:  Goals to be met by: 8/11/17     Patient will increase functional independence with ADLs by performing:    Eating with Moderate assistance. - not met  UE Dressing with Maximum Assistance. - not met  LE Dressing with Maximum Assistance. - not met  Grooming while seated with Moderate Assistance. - not met  Toileting from bedside commode with Maximum Assistance for hygiene and clothing management. - not met  Sitting at edge of bed x15 minutes with Contact Guard Assistance. - not met  Rolling to Bilateral with Minimal Assistance. - not met  Supine to sit with Moderate Assistance. - not met  Stand pivot transfers with Max Assistance. - not met  Pt will follow 3/4 one step commands. - not met                      Reasons for Discontinuation of Therapy Services  Transfer to alternate level of care.      Plan:  Patient Discharged to: Skilled Nursing Facility.    KAEL Lambert 8/1/2017

## 2017-08-01 NOTE — DISCHARGE SUMMARY
Discharge Summary  Vascular Neurology    Patient Name: Malina Robles    YOB: 1960    Admit Date: 7/19/2017    Discharge Date and Time: 07/31/17    Discharge Attending Physician: Jun Hernandez MD     Discharge Resident Team: Networked reference to record PCT     Chief Complaint/Reason for Admission: stroke     Primary Diagnosis: Nontraumatic intracerebral hemorrhage in hemisphere, unspecified    Secondary Diagnosis:  Patient Active Problem List   Diagnosis    Jaundice    Cholelithiasis    Elevated liver enzymes    Essential hypertension    Calculus of bile duct without mention of cholecystitis, with obstruction    Hypothyroid    Nontraumatic intracerebral hemorrhage in hemisphere, unspecified    Right-sided nontraumatic intracerebral hemorrhage    Vasogenic cerebral edema    Respiratory alkalosis    Acute blood loss anemia    Encounter for intubation    Calculus of gallbladder with acute cholecystitis and obstruction    Dysphagia       History of Presenting Illness: Ms. Malina Robles is 57 year old female with a PMHx of prior Stroke, Asthma, coronary artery disease (S/P Cardiac Surgery), Depression, Emphysema, GERD, Hypertension, thyroid disease, with recent Biliary Stent 7/18,  who presents to Neuro Critical Care as a transfer from [facility] s/p [facility]. Per the chart,  she was admitted to [facility] from Nursing Home on 7/12/17 with altered mental status and increase jaundice upon admission PT-INR 4.9, , , Ammonia <9 on 7/12. She was on Plavix prior to admit for coronary artery disease which was upon admission.CT of abdomen on 7/13/17 showed 2cm stone distal common duct resulting in moderate biliary ductal dilatation and 3cm infrarenal abdominal aortic aneurysm. On 7/14 Urine culture positive for gram negative rods. She is being treated with IV Ceftriaxone.  7/15 liver were enzymes worsening.   On 7/18/17 GI attempted  ERCP, but  could not get stone out. Was able to place stent though with only old bile noted behind the stone. he was started on nicardipine gtt for goal  to 140. Her GCS was 9 to 10, and she was intubated for airway protection prior to arrival at [facility]. She lives at [facility]. No family at bedside. Patient will be admitted to Neuro Critical Care for a higher level of care.              Hospital Course: 7/19 admitted to Neuro Critical Care as a transfer from [facility] s/p Right Temporal ICH 4.2 cm recent Biliary Stent 7/18 at outside hospital  Cultured today, consulted GI for repeat of ERCP; started abx, pending CTA     7/19: AMS this AM. CT head shows R temporal ICH with IVH. Transferred to [facility] ED and admitted to [facility]. Of note, patient's initial INR was 4.9.      07/20: remains intubated and on cardene, patient not sedated. See by GI for bile duct stone and plans to repeat ERCP. Patient with leukocytosis-cultures pending, on vanc/zosyn.   7/21/17 Remains intubated. CTA without abnormality to explain ICH.   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 Globally aphasic. Intermittent jaw movements. 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 Pending  EEG report. Pending ERCG and PEG.   7/27/17: neuro exam stable, still not following commands; d/c keppra, repeat EEG/MBSS tomorrow; ERCP next week  7/28 - continues to be difficult to arouse in the mornings, but is more awake around noon, ordered MRI brain to assess for mass    Dysphagia     ST recommending Pureed/honey-thick  7/29 - RN reports pt ate ~70% of meal  -Reeval need for any PEG       Vasogenic cerebral edema     2/2  ICH  Evident on imaging       Hypothyroid     Continue synthroid 25 Qdaily       Calculus of bile duct without mention of cholecystitis, with obstruction     - Temporary stent in place, biliary system decompressed with improved hepatic function  - Zosyn (initially started in neuro ICU for leukocytosis with end date of 7/25; then continued on 7/27, 7/28 and onwards in vascular neurology notes for calculus of bile duct without cholecystitis, w/ obstruction)   - Transitioned to Cipro 500mg BID instead of Zosyn until outpatient ERCP, scheduling by GI.        Essential hypertension     - Stroke risk factor  - SBP goal <140  -Lisinopril 40  -HCTZ 12.5 mg Qdaily       Elevated liver enzymes     AST/ALT improving   INR 4.9 previously; now normal at 1.0  -continue pravastatin, anticipate switch to high-dose statin after LFTs normalize       Cholelithiasis     - ERCP per [facility] as outpt, Cipro until procedure       Jaundice     Patient with history of hep C and with current bile duct stone, had stent placed at OSH  GI following  AST 43 ALT 70  t bili 4.8  Will need repeat ERCP       * Nontraumatic intracerebral hemorrhage in hemisphere, unspecified     57 y.o. female PMH hepatitis C with transaminitis, CAD, thyroid disease, and h/o CVA on ASA, plavix. CT with R temporal ICH. CTA without evidence of etiology and ECHO EF 60, normal atrial size.       7/28/17 MRI Brain limited by motion, grossly stable hemorrhage when compared to prior.     Antiplatelets: start ASA  Statins: Repeat LDL 170s; Pravastatin 40 mg (switch to high dose after LFTs improve)  SBP < 140  Diagnostics: MRI brain with contrast in 1 month, pending EEG  PT/OT/ST -> SNF  DVT ppx: TEDs, SCDs          Discharge to NH at [facility] with PT, OT, SLP, Dietary consults    Prior to discharge, the primary team reviewed the discharge plan and med list with the patient, who communicated his understanding and agreement with the plan.       Significant  Investigations: PM&R consult, Dietary, GI- endoscopy service     Surgical / Diagnostic Procedures: EEG, MRI brain, MBSSI, XR ap, XR KUB, U/S lower ext, CT head w/o x3, CTA head, septic workup , Arterial line placement, central line placement.     Studies Pending: GI to perform ERCP as outpatient; patient to remain on Cipro for intraabdominal coverage until ERCP per GI planning    Discharged Condition: stable and good condition    Discharge Disposition: [facility] alf care    Follow-up Plan: ERCP planning with GI, PCP 1 week, Vascular Neuro 4 - 6 weeks.     No future appointments.    Patient Instructions / Medications:   Discharge Medication List as of 7/31/2017  6:10 PM      START taking these medications    Details   amlodipine (NORVASC) 10 MG tablet Take 1 tablet (10 mg total) by mouth once daily., Starting Mon 7/31/2017, Until Tue 7/31/2018, Print      ciprofloxacin HCl (CIPRO) 500 MG tablet Take 1 tablet (500 mg total) by mouth every 12 (twelve) hours. For intraabdominal coverage until ERCP, Starting Mon 7/31/2017, Until Thu 8/10/2017, Print      hydrochlorothiazide (HYDRODIURIL) 12.5 MG Tab Take 1 tablet (12.5 mg total) by mouth once daily., Starting Mon 7/31/2017, Until Tue 7/31/2018, Print      pravastatin (PRAVACHOL) 40 MG tablet Take 1 tablet (40 mg total) by mouth every evening., Starting Mon 7/31/2017, Until Tue 7/31/2018, Print         CONTINUE these medications which have NOT CHANGED    Details   albuterol sulfate 90 mcg/actuation AePB Inhale 180 mcg into the lungs every 6 (six) hours as needed. Rescue, Historical Med      aspirin (ECOTRIN) 81 MG EC tablet Take 81 mg by mouth once daily., Until Discontinued, Historical Med      clopidogrel (PLAVIX) 75 mg tablet Take 75 mg by mouth once daily., Until Discontinued, Historical Med      docusate sodium (COLACE) 100 MG capsule Take 100 mg by mouth 2 (two) times daily., Until Discontinued, Historical Med      ergocalciferol (VITAMIN D2) 50,000 unit Cap  Take 50,000 Units by mouth every 7 days., Until Discontinued, Historical Med      fish oil-omega-3 fatty acids 300-1,000 mg capsule Take 1 g by mouth 2 (two) times daily. , Historical Med      fluoxetine (PROZAC) 40 MG capsule Take 40 mg by mouth once daily., Until Discontinued, Historical Med      IRON/FA/DHA/EPA/FAD/NADH/MV47 (ENLYTE, FERROUS GLYCINE, ORAL) Take 30 mg by mouth once daily at 6am., Historical Med      lactulose (CEPHULAC) 10 gram packet Take 10 g by mouth 3 (three) times daily., Until Discontinued, Historical Med      levothyroxine (SYNTHROID) 25 MCG tablet Take 25 mcg by mouth once daily., Until Discontinued, Historical Med      lisinopril (PRINIVIL,ZESTRIL) 20 MG tablet Take 20 mg by mouth once daily., Historical Med      nitroGLYCERIN (NITROSTAT) 0.4 MG SL tablet Place 0.4 mg under the tongue every 5 (five) minutes as needed., Until Discontinued, Historical Med      olanzapine (ZYPREXA) 20 MG tablet Take 7.5 mg by mouth every evening. , Historical Med      pantoprazole (PROTONIX) 40 MG tablet Take 40 mg by mouth once daily., Until Discontinued, Historical Med      tiotropium (SPIRIVA) 18 mcg inhalation capsule Inhale 18 mcg into the lungs once daily., Until Discontinued, Historical Med      tramadol (ULTRAM) 50 mg tablet Take 50 mg by mouth every 6 (six) hours as needed for Pain., Historical Med      valproate (DEPAKENE) 250 mg/5 mL syrup Take 1,000 mg by mouth 2 (two) times daily., Historical Med         STOP taking these medications       atorvastatin (LIPITOR) 20 MG tablet Comments:   Reason for Stopping:         famotidine (PEPCID) 20 MG tablet Comments:   Reason for Stopping:         isosorbide mononitrate (IMDUR) 30 MG 24 hr tablet Comments:   Reason for Stopping:         trazodone (DESYREL) 100 MG tablet Comments:   Reason for Stopping:                 Discharge Procedure Orders  Diet Diabetic 1800 Calories   Order Comments: Diet pureed, nectar thick     Diet Cardiac     Call MD for:   temperature >100.4     Call MD for:  persistent nausea and vomiting or diarrhea     Call MD for:  severe persistent headache     Call MD for:  persistent dizziness, light-headedness, or visual disturbances     Call MD for:  difficulty breathing or increased cough

## 2017-08-01 NOTE — PLAN OF CARE
Problem: Occupational Therapy Goal  Goal: Occupational Therapy Goal  Goals to be met by: 8/11/17     Patient will increase functional independence with ADLs by performing:    Eating with Moderate assistance. - not met  UE Dressing with Maximum Assistance. - not met  LE Dressing with Maximum Assistance. - not met  Grooming while seated with Moderate Assistance. - not met  Toileting from bedside commode with Maximum Assistance for hygiene and clothing management. - not met  Sitting at edge of bed x15 minutes with Contact Guard Assistance. - not met  Rolling to Bilateral with Minimal Assistance. - not met  Supine to sit with Moderate Assistance. - not met  Stand pivot transfers with Max Assistance. - not met  Pt will follow 3/4 one step commands. - not met      Outcome: Outcome(s) achieved Date Met: 08/01/17  No goals met.  Hospital discharge.

## 2017-08-02 ENCOUNTER — TELEPHONE (OUTPATIENT)
Dept: GASTROENTEROLOGY | Facility: HOSPITAL | Age: 57
End: 2017-08-02

## 2017-08-02 DIAGNOSIS — K80.70 CHOLELITHIASIS WITH CHOLEDOCHOLITHIASIS: Primary | ICD-10-CM

## 2017-08-18 PROBLEM — N17.9 AKI (ACUTE KIDNEY INJURY): Status: ACTIVE | Noted: 2017-08-18

## 2017-08-19 PROBLEM — R63.6 UNDERWEIGHT: Status: ACTIVE | Noted: 2017-08-19

## 2017-08-19 PROBLEM — N39.0 UTI (URINARY TRACT INFECTION): Status: ACTIVE | Noted: 2017-08-19

## 2017-08-19 PROBLEM — J44.1 COPD EXACERBATION: Status: ACTIVE | Noted: 2017-08-19

## 2017-08-19 PROBLEM — G40.909 SEIZURE DISORDER: Status: ACTIVE | Noted: 2017-08-19

## 2017-08-19 PROBLEM — E78.5 DYSLIPIDEMIA: Status: ACTIVE | Noted: 2017-08-19

## 2017-08-21 PROBLEM — R53.2 FUNCTIONAL QUADRIPLEGIA: Status: ACTIVE | Noted: 2017-08-21

## 2017-08-23 PROBLEM — B96.20 E-COLI UTI: Status: ACTIVE | Noted: 2017-08-19

## 2017-09-08 ENCOUNTER — TELEPHONE (OUTPATIENT)
Dept: ENDOSCOPY | Facility: HOSPITAL | Age: 57
End: 2017-09-08

## 2017-09-08 NOTE — TELEPHONE ENCOUNTER
Patient had ERCP with CBD stent placement by Dr Villanueva at [facility] on 7/18/17.  Patient was transferred to [facility] and it appears the patient had many complications.  On 8/2/17 Dr Wolfe created an outpatient ERCP case.  Patient was transferred to [facility] in [place], [place] where she remains.  Can you have one of our [facility] doctors review this to see if we need to pursue scheduling the case here or does it need to go back to Dr Villanueva?  Thank you.

## 2017-09-18 ENCOUNTER — TELEPHONE (OUTPATIENT)
Dept: ENDOSCOPY | Facility: HOSPITAL | Age: 57
End: 2017-09-18

## 2017-09-18 NOTE — TELEPHONE ENCOUNTER
Spoke with  and nurse at [facility][note 3] about scheduling ERCP.  They will fax medication record.

## 2017-09-27 NOTE — PROCEDURES
DATE OF PROCEDURE:  07/20/2017    EEG NUMBERS:  NS--1, GF--2    REFERRING PHYSICIAN:  Varinder Olivas M.D.    This EEG was performed to assess for subclinical seizures.    ICU EEG/VIDEO MONITORING REPORT    METHODOLOGY:  Electroencephalographic (EEG) is recorded with electrodes placed   according to the International 10-20 placement system.  Thirty two (32) channels   of digital signal (sampling rate of 512/sec), including T1 and T2, were   simultaneously recorded from the scalp and may include EKG, EMG, and/or eye   monitors.  Recording band pass was 0.1 to 512 Hz.  Digital video recording of   the patient is simultaneously recorded with the EEG.  The patient is instructed   to report clinical symptoms which may occur during the recording session.  EEG   and video recording are stored and archived in digital format.  Activation   procedures, which include photic stimulation, hyperventilation and instructing   patients to perform simple tasks, are done in selected patients.    The EEG is displayed on a monitor screen and can be reviewed using different   montages.  Computer-assisted analysis is employed to detect spike and   electrographic seizure activity.  The entire record is submitted for computer   analysis.  The entire recording is visually reviewed, and the times identified   by computer analysis as being spikes or seizures are reviewed again.    Compressed spectral analysis (CSA) is also performed on the activity recorded   from each individual channel.  This is displayed as a power display of   frequencies from 0 to 30 Hz over time.  The CSA is reviewed looking for   asymmetries in power between homologous areas of the scalp, then compared with   the original EEG recording.    Logo[employer] software was also utilized in the review of this study.  This software   suite analyzes the EEG recording in multiple domains.  Coherence and rhythmicity   are computed to identify EEG sections which may  contain organized seizures.    Each channel undergoes analysis to detect the presence of spike and sharp waves   which have special and morphological characteristics of epileptic activity.  The   routine EEG recording is converted from special into frequency domain.  This is   then displayed comparing homologous areas to identify areas of significant   asymmetry.  Algorithm to identify non-cortically generated artifact is used to   separate artifact from the EEG.    RECORDING TIMES:  Start on 07/20/2017 at hour 1 minute 23 second 7  Stop on 07/20/2017 at hour 7 minute 0 second 5  Restart on 07/20/2017 at hour 7, minute 0, second 22  Stop on 07/20/2017 at hour 9, minute 9, second 32  Total time of video EEG recording was 7 hours and 46 minutes.    EEG FINDINGS:  The recording was obtained with a number of standard bipolar and   referential montages during lethargic state.  In this state, the background was   diffusely disorganized and comprised of an admixture of frequencies, which was   symmetric.  During deeper stages of clinical sleep, symmetric sleep spindles   were noted.  There were no interictal epileptiform abnormalities and no clinical   or electrographic seizures were recorded.  Activation procedures were not   performed.  Spontaneous variability and reactivity was noted.    Portions of the record were obscured by technical artifacts related to electrode   P4.    The EKG channel revealed sinus rhythm.    IMPRESSION:  This is an abnormal EEG during lethargic state.  Diffuse   disorganized slowing of the background was noted.    CLINICAL CORRELATION:  The patient is a 57-year-old female with a history of   multiple medical problems who presented with intraparenchymal hemorrhage.  The   patient is currently maintained on Keppra.  This is an abnormal EEG during   lethargic state.  The overall degree of slowing and disorganization for given   age is suggestive of a moderate encephalopathy, nonspecific to the  cause.  There   is no evidence of an epileptic process on this recording.  No seizures were   recorded during this study.      FAK/HN  dd: 09/27/2017 10:16:55 (CDT)  td: 09/27/2017 10:30:39 (CDT)  Doc ID   #[id number]  Job ID #[id number]    CC:

## 2017-10-10 ENCOUNTER — TELEPHONE (OUTPATIENT)
Dept: ENDOSCOPY | Facility: HOSPITAL | Age: 57
End: 2017-10-10

## 2017-12-06 ENCOUNTER — TELEPHONE (OUTPATIENT)
Dept: GASTROENTEROLOGY | Facility: CLINIC | Age: 57
End: 2017-12-06

## 2017-12-06 NOTE — TELEPHONE ENCOUNTER
I spoke with Monica from [facility][note 3] regarding procedure. She was not the person you spoke to on Monday. However, she did not appear to know about the situation. I explained to her that Ms Robles needs an ERCP for a stent removal. Patient is self-responsible and there is concern that she is not able to consent herself for a procedure. She has no family to consent her. Monica stated that she would speak with her  and call me back.

## 2017-12-12 ENCOUNTER — TELEPHONE (OUTPATIENT)
Dept: GASTROENTEROLOGY | Facility: CLINIC | Age: 57
End: 2017-12-12

## 2017-12-13 ENCOUNTER — TELEPHONE (OUTPATIENT)
Dept: GASTROENTEROLOGY | Facility: CLINIC | Age: 57
End: 2017-12-13

## 2017-12-13 NOTE — TELEPHONE ENCOUNTER
Message   Received: Today   Message Contents   MD Linsey Puckett MA   Caller: Unspecified (Today, 11:11 AM)             Great !!

## 2017-12-13 NOTE — TELEPHONE ENCOUNTER
Spoke with May,  at [facility] and Katherine , Director of Nursing. They state that patient is competent and able to consent herself. They are going to fax over documentation to support this. Also, she is on Plavix. Once we have everything cleared everything regarding  the consent issue, we will discuss instructions and Plavix holding. Tentatively, we will plan on doing ERCP on 1/2 at 10a.

## 2017-12-19 ENCOUNTER — TELEPHONE (OUTPATIENT)
Dept: GASTROENTEROLOGY | Facility: CLINIC | Age: 57
End: 2017-12-19

## 2017-12-19 ENCOUNTER — TELEPHONE (OUTPATIENT)
Dept: ENDOSCOPY | Facility: HOSPITAL | Age: 57
End: 2017-12-19

## 2017-12-19 NOTE — TELEPHONE ENCOUNTER
Spoke with Carolina from [facility]. ERCP rescheduled to 1/9/18 at 8:30a. Offer a later time, but Carolina stated that they would have her transportation scheduled for 6am and would get her here for 7:30a.

## 2017-12-19 NOTE — TELEPHONE ENCOUNTER
Spoke with Katherine/NIYAH at [facility] about ERCP scheduled 1/9/18 at 0830.  She states patient's PCP Dr Jose Rios still oversees patient's Plavix.  Faxed Dr Rios (ph [phone]/fx [phone]) requesting permission to hold Plavix for 5 days prior to procedure.  Will f/u mario/Katherine after response.

## 2017-12-28 ENCOUNTER — TELEPHONE (OUTPATIENT)
Dept: ENDOSCOPY | Facility: HOSPITAL | Age: 57
End: 2017-12-28

## 2017-12-28 NOTE — TELEPHONE ENCOUNTER
Received fax from Dr Jose Rios giving permission to hold Plavix for 5 days prior to ERCP scheduled 1/9/18 at 0830.  Copy scanned to [facility].  Spoke with Assistant NIYAH Hernandez at [facility] (where patient is a resident) about instructions for procedure.  Instructions faxed to her (ph [phone]/fx [phone]).

## 2018-01-09 ENCOUNTER — SURGERY (OUTPATIENT)
Age: 58
End: 2018-01-09

## 2018-01-09 ENCOUNTER — ANESTHESIA (OUTPATIENT)
Dept: ENDOSCOPY | Facility: HOSPITAL | Age: 58
End: 2018-01-09
Payer: MEDICAID

## 2018-01-09 ENCOUNTER — HOSPITAL ENCOUNTER (OUTPATIENT)
Facility: HOSPITAL | Age: 58
Discharge: HOME | End: 2018-01-09
Attending: INTERNAL MEDICINE | Admitting: INTERNAL MEDICINE
Payer: MEDICAID

## 2018-01-09 ENCOUNTER — ANESTHESIA EVENT (OUTPATIENT)
Dept: ENDOSCOPY | Facility: HOSPITAL | Age: 58
End: 2018-01-09
Payer: MEDICAID

## 2018-01-09 VITALS
WEIGHT: 120.38 LBS | TEMPERATURE: 98 F | HEIGHT: 63 IN | RESPIRATION RATE: 18 BRPM | DIASTOLIC BLOOD PRESSURE: 60 MMHG | HEART RATE: 94 BPM | OXYGEN SATURATION: 100 % | BODY MASS INDEX: 21.33 KG/M2 | SYSTOLIC BLOOD PRESSURE: 105 MMHG

## 2018-01-09 DIAGNOSIS — K80.50 CHOLEDOCHOLITHIASIS: ICD-10-CM

## 2018-01-09 DIAGNOSIS — R74.8 ELEVATED LIVER ENZYMES: Primary | ICD-10-CM

## 2018-01-09 PROCEDURE — 27202125 HC BALLOON, EXTRACTION (ANY): Performed by: INTERNAL MEDICINE

## 2018-01-09 PROCEDURE — D9220A PRA ANESTHESIA: Mod: CRNA,,, | Performed by: NURSE ANESTHETIST, CERTIFIED REGISTERED

## 2018-01-09 PROCEDURE — 43265 ERCP LITHOTRIPSY CALCULI: CPT | Mod: 51,,, | Performed by: INTERNAL MEDICINE

## 2018-01-09 PROCEDURE — 27202127 HC STENT INTRODUCER: Performed by: INTERNAL MEDICINE

## 2018-01-09 PROCEDURE — 37000008 HC ANESTHESIA 1ST 15 MINUTES: Performed by: INTERNAL MEDICINE

## 2018-01-09 PROCEDURE — 00732 ANES UPR GI NDSC PX ERCP: CPT | Performed by: INTERNAL MEDICINE

## 2018-01-09 PROCEDURE — 94761 N-INVAS EAR/PLS OXIMETRY MLT: CPT

## 2018-01-09 PROCEDURE — 43265 ERCP LITHOTRIPSY CALCULI: CPT | Performed by: INTERNAL MEDICINE

## 2018-01-09 PROCEDURE — C1769 GUIDE WIRE: HCPCS | Performed by: INTERNAL MEDICINE

## 2018-01-09 PROCEDURE — 43276 ERCP STENT EXCHANGE W/DILATE: CPT | Mod: ,,, | Performed by: INTERNAL MEDICINE

## 2018-01-09 PROCEDURE — 63600175 PHARM REV CODE 636 W HCPCS: Performed by: NURSE ANESTHETIST, CERTIFIED REGISTERED

## 2018-01-09 PROCEDURE — 25000003 PHARM REV CODE 250: Performed by: NURSE ANESTHETIST, CERTIFIED REGISTERED

## 2018-01-09 PROCEDURE — 37000009 HC ANESTHESIA EA ADD 15 MINS: Performed by: INTERNAL MEDICINE

## 2018-01-09 PROCEDURE — 74328 X-RAY BILE DUCT ENDOSCOPY: CPT | Performed by: INTERNAL MEDICINE

## 2018-01-09 PROCEDURE — 43276 ERCP STENT EXCHANGE W/DILATE: CPT | Performed by: INTERNAL MEDICINE

## 2018-01-09 PROCEDURE — 43273 ENDOSCOPIC PANCREATOSCOPY: CPT | Performed by: INTERNAL MEDICINE

## 2018-01-09 PROCEDURE — 27201702 HC BASKET, RETRIEVAL/LITHOTRIPTOR: Performed by: INTERNAL MEDICINE

## 2018-01-09 PROCEDURE — 27201275 HC CATHETER, SPYSCOPE: Performed by: INTERNAL MEDICINE

## 2018-01-09 PROCEDURE — D9220A PRA ANESTHESIA: Mod: ANES,,, | Performed by: ANESTHESIOLOGY

## 2018-01-09 PROCEDURE — 25000003 PHARM REV CODE 250: Performed by: INTERNAL MEDICINE

## 2018-01-09 PROCEDURE — 27201089 HC SNARE, DISP (ANY): Performed by: INTERNAL MEDICINE

## 2018-01-09 PROCEDURE — 74328 X-RAY BILE DUCT ENDOSCOPY: CPT | Mod: 26,,, | Performed by: INTERNAL MEDICINE

## 2018-01-09 PROCEDURE — 27200992 HC LITHOTRIPTOR, PROBE: Performed by: INTERNAL MEDICINE

## 2018-01-09 PROCEDURE — 43273 ENDOSCOPIC PANCREATOSCOPY: CPT | Mod: ,,, | Performed by: INTERNAL MEDICINE

## 2018-01-09 PROCEDURE — C2617 STENT, NON-COR, TEM W/O DEL: HCPCS | Performed by: INTERNAL MEDICINE

## 2018-01-09 RX ORDER — DIPHENHYDRAMINE HYDROCHLORIDE 50 MG/ML
25 INJECTION INTRAMUSCULAR; INTRAVENOUS EVERY 6 HOURS PRN
Status: DISCONTINUED | OUTPATIENT
Start: 2018-01-09 | End: 2018-01-09 | Stop reason: HOSPADM

## 2018-01-09 RX ORDER — PROPOFOL 10 MG/ML
VIAL (ML) INTRAVENOUS
Status: DISCONTINUED | OUTPATIENT
Start: 2018-01-09 | End: 2018-01-09

## 2018-01-09 RX ORDER — ESMOLOL HYDROCHLORIDE 10 MG/ML
INJECTION INTRAVENOUS
Status: DISCONTINUED | OUTPATIENT
Start: 2018-01-09 | End: 2018-01-09

## 2018-01-09 RX ORDER — CIPROFLOXACIN 2 MG/ML
INJECTION, SOLUTION INTRAVENOUS
Status: DISCONTINUED | OUTPATIENT
Start: 2018-01-09 | End: 2018-01-09

## 2018-01-09 RX ORDER — LIDOCAINE HCL/PF 100 MG/5ML
SYRINGE (ML) INTRAVENOUS
Status: DISCONTINUED | OUTPATIENT
Start: 2018-01-09 | End: 2018-01-09

## 2018-01-09 RX ORDER — FENTANYL CITRATE 50 UG/ML
INJECTION, SOLUTION INTRAMUSCULAR; INTRAVENOUS
Status: DISCONTINUED | OUTPATIENT
Start: 2018-01-09 | End: 2018-01-09

## 2018-01-09 RX ORDER — CIPROFLOXACIN 500 MG/1
500 TABLET ORAL EVERY 12 HOURS
Qty: 10 TABLET | Refills: 0 | Status: SHIPPED | OUTPATIENT
Start: 2018-01-09 | End: 2018-01-14

## 2018-01-09 RX ORDER — ROCURONIUM BROMIDE 10 MG/ML
INJECTION, SOLUTION INTRAVENOUS
Status: DISCONTINUED | OUTPATIENT
Start: 2018-01-09 | End: 2018-01-09

## 2018-01-09 RX ORDER — HYDROMORPHONE HYDROCHLORIDE 2 MG/ML
0.2 INJECTION, SOLUTION INTRAMUSCULAR; INTRAVENOUS; SUBCUTANEOUS EVERY 5 MIN PRN
Status: DISCONTINUED | OUTPATIENT
Start: 2018-01-09 | End: 2018-01-09 | Stop reason: HOSPADM

## 2018-01-09 RX ORDER — SODIUM CHLORIDE 9 MG/ML
INJECTION, SOLUTION INTRAVENOUS CONTINUOUS
Status: DISCONTINUED | OUTPATIENT
Start: 2018-01-09 | End: 2018-01-09 | Stop reason: HOSPADM

## 2018-01-09 RX ORDER — ONDANSETRON 2 MG/ML
4 INJECTION INTRAMUSCULAR; INTRAVENOUS ONCE AS NEEDED
Status: DISCONTINUED | OUTPATIENT
Start: 2018-01-09 | End: 2018-01-09 | Stop reason: HOSPADM

## 2018-01-09 RX ORDER — ONDANSETRON 2 MG/ML
INJECTION INTRAMUSCULAR; INTRAVENOUS
Status: DISCONTINUED | OUTPATIENT
Start: 2018-01-09 | End: 2018-01-09

## 2018-01-09 RX ADMIN — SUGAMMADEX 200 MG: 100 INJECTION, SOLUTION INTRAVENOUS at 11:01

## 2018-01-09 RX ADMIN — FENTANYL CITRATE 25 MCG: 50 INJECTION, SOLUTION INTRAMUSCULAR; INTRAVENOUS at 11:01

## 2018-01-09 RX ADMIN — PROPOFOL 100 MG: 10 INJECTION, EMULSION INTRAVENOUS at 09:01

## 2018-01-09 RX ADMIN — ESMOLOL HYDROCHLORIDE 30 MG: 10 INJECTION INTRAVENOUS at 09:01

## 2018-01-09 RX ADMIN — ROCURONIUM BROMIDE 50 MG: 10 INJECTION, SOLUTION INTRAVENOUS at 09:01

## 2018-01-09 RX ADMIN — CIPROFLOXACIN 400 MG: 2 INJECTION, SOLUTION INTRAVENOUS at 10:01

## 2018-01-09 RX ADMIN — PROPOFOL 50 MG: 10 INJECTION, EMULSION INTRAVENOUS at 09:01

## 2018-01-09 RX ADMIN — ONDANSETRON 4 MG: 2 INJECTION INTRAMUSCULAR; INTRAVENOUS at 11:01

## 2018-01-09 RX ADMIN — ESMOLOL HYDROCHLORIDE 10 MG: 10 INJECTION INTRAVENOUS at 10:01

## 2018-01-09 RX ADMIN — SODIUM CHLORIDE: 0.9 INJECTION, SOLUTION INTRAVENOUS at 09:01

## 2018-01-09 RX ADMIN — LIDOCAINE HYDROCHLORIDE 100 MG: 20 INJECTION, SOLUTION INTRAVENOUS at 09:01

## 2018-01-09 RX ADMIN — FENTANYL CITRATE 25 MCG: 50 INJECTION, SOLUTION INTRAMUSCULAR; INTRAVENOUS at 10:01

## 2018-01-09 NOTE — PLAN OF CARE
Discharge instructions and prescription provided to patient.  Verbalized understanding.  PT stable, tolerating po fluids.  No complaints noted.  Adequate for d/c at this time.

## 2018-01-09 NOTE — TRANSFER OF CARE
"Anesthesia Transfer of Care Note    Patient: Malina Robles    Procedure(s) Performed: Procedure(s) (LRB):  ERCP (N/A)    Patient location: PACU    Anesthesia Type: general    Transport from OR: Transported from OR on 6-10 L/min O2 by face mask with adequate spontaneous ventilation    Post pain: adequate analgesia    Post assessment: tolerated procedure well and no apparent anesthetic complications    Post vital signs: stable    Level of consciousness: awake and alert    Nausea/Vomiting: no nausea/vomiting    Complications: none    Transfer of care protocol was followed      Last vitals:   Visit Vitals  /74 (BP Location: Left arm, Patient Position: Lying)   Pulse 90   Temp 36.6 °C (97.8 °F) (Oral)   Resp 16   Ht 5' 3" (1.6 m)   Wt 54.6 kg (120 lb 6.4 oz)   LMP  (LMP Unknown)   SpO2 100%   Breastfeeding? No   BMI 21.33 kg/m²     "

## 2018-01-09 NOTE — PROVATION PATIENT INSTRUCTIONS
Discharge Summary/Instructions after an Endoscopic Procedure  Patient Name: Malina Robles  Patient MRN: [id number]  Patient YOB: 1960  Tuesday, January 09, 2018  Silverio Montano MD  RESTRICTIONS:  During your procedure today, you received medications for sedation.  These   medications may affect your judgment, balance and coordination.  Therefore,   for 24 hours, you have the following restrictions:   - DO NOT drive a car, operate machinery, make legal/financial decisions,   sign important papers or drink alcohol.    ACTIVITY:  The following day: return to full activity including work, except no heavy   lifting, straining or running for 3 days if polyps were removed.  DIET:  Eat and drink normally unless instructed otherwise.     TREATMENT FOR COMMON SIDE EFFECTS:  - Mild abdominal pain, belching, bloating or excessive gas: rest, eat   lightly and use a heating pad.  - Sore Throat: treat with throat lozenges and/or gargle with warm salt   water.  SYMPTOMS TO WATCH FOR AND REPORT TO YOUR PHYSICIAN:  1. Abdominal pain or bloating, other than gas cramps.  2. Chest pain.  3. Back pain.  4. Chills or fever occurring within 24 hours after the procedure.  5. Rectal bleeding, which would show as bright red, maroon, or black stools.   (A tablespoon of blood from the rectum is not serious, especially if   hemorrhoids are present.)  6. Vomiting.  7. Weakness or dizziness.  8. Because air was used during the procedure, expelling large amounts of air   from your rectum or belching is normal.  9. If a bowel prep was taken, you may not have a bowel movement for 1-3   days.  This is normal.  GO DIRECTLY TO THE NEAREST EMERGENCY ROOM IF YOU HAVE ANY OF THE FOLLOWING:      Difficulty breathing  Chills and/or fever over 101 F   Persistent vomiting and/or vomiting blood   Severe abdominal pain   Severe chest pain   Black, tarry stools   Bleeding- more than one tablespoon   Any other symptom or condition that you may feel  needs urgent attention  Your doctor recommends these additional instructions:  If any biopsies were taken, your doctor s clinic will contact you in 1 to 2   weeks with any results.  You are being discharged to home.   Watch for symptoms of pancreatitis, bleeding, perforation and cholangitis.   Your physician has recommended a repeat ERCP in eight weeks for retreatment.     Take Cipro (ciprofloxacin) 500 mg by mouth twice a day for five days.  For questions, problems or results please call your physician - Silverio Montano MD at Work:  [phone].  [facility], EMERGENCY ROOM PHONE NUMBER: [phone]  IF A COMPLICATION OR EMERGENCY SITUATION ARISES AND YOU ARE UNABLE TO REACH   YOUR PHYSICIAN - GO DIRECTLY TO THE EMERGENCY ROOM.  Silverio Montano MD  1/9/2018 11:41:45 AM  This report has been verified and signed electronically.

## 2018-01-09 NOTE — PROGRESS NOTES
PT AIDE AT BEDSIDE TO ASSIST WITH DRESSING AND MOVING PT TO WHEELCHAIR.  Pt has no complaints at this time and is acceptable for d/c to facility.  Report given to DEVENDRA Neville at nursing home.  All questions answered.

## 2018-01-09 NOTE — ANESTHESIA PREPROCEDURE EVALUATION
01/09/2018  Malina Robles is a 57 y.o., female.    Anesthesia Evaluation         Review of Systems  Anesthesia Hx:  No problems with previous Anesthesia   Social:  Smoker    Hematology/Oncology:        Hematology Comments:       Decreased plts           Cardiovascular:   Exercise tolerance: poor Hypertension CAD  CABG/stent  Angina  Functional Capacity Can you climb two flights of stairs? ==> Yes    Pulmonary:   COPD Asthma Denies Recent URI.  Denies Sleep Apnea.    Renal/:  Renal/ Normal     Hepatic/GI:   Denies PUD. Denies Hiatal Hernia. GERD Denies Liver Disease.  Denies Hepatitis.    Neurological:   CVA, residual symptoms Denies Seizures.    Endocrine:   Denies Diabetes. Hypothyroidism        Physical Exam  General:  Cachexia    Airway/Jaw/Neck:  Airway Findings: Mouth Opening: Normal Tongue: Normal  General Airway Assessment: Adult  Mallampati: I  TM Distance: Normal, at least 6 cm  Jaw/Neck Findings:  Neck ROM: Normal ROM  Neck Findings:     Eyes/Ears/Nose:  EYES/EARS/NOSE FINDINGS: Normal   Dental:  Dental Findings: Periodontal disease, Severe   Chest/Lungs:  Chest/Lungs Findings: Clear to auscultation     Heart/Vascular:  Heart Findings: Rate: Normal  Rhythm: Regular Rhythm  Sounds: Normal        Mental Status:  Mental Status Findings:  Cooperative         Anesthesia Plan  Type of Anesthesia, risks & benefits discussed:  Anesthesia Type:  general  Patient's Preference: Proceed with anesthesia understanding that the risks are very small but could be serious or life threatening.  Intra-op Monitoring Plan: standard ASA monitors  Intra-op Monitoring Plan Comments:   Post Op Pain Control Plan:   Post Op Pain Control Plan Comments:   Induction:   IV  Beta Blocker:  Patient is not currently on a Beta-Blocker (No further documentation required).       Informed Consent: Patient understands risks and  agrees with Anesthesia plan.  Questions answered. Anesthesia consent signed with patient.  ASA Score: 3     Day of Surgery Review of History & Physical: I have interviewed and examined the patient. I have reviewed the patient's H&P dated:        Anesthesia Plan Notes:       She is oriented to season, thought it was still December, and not able to independently describe procedure or pathology. However She does seem to understand that she has abdominal pain, and that she needs a procedure to help with that and wishes to proceed. On other details of her medical history she seems reasonably clear and and in accordance with the medical records. Telephone call to nursing home by RN reveals that she has no family members and that this is her baseline mental status. She has had prior similar procedures at [facility] in recent years. I feel that it she does need this procedure for her own best interest and that proceeding is reasonable with her verbal and written consent provided.      Cholo Hui M.D.  01/09/2018  9:10 AM          Ready For Surgery From Anesthesia Perspective.

## 2018-01-09 NOTE — ANESTHESIA POSTPROCEDURE EVALUATION
"Anesthesia Post Evaluation    Patient: Malina Robles    Procedure(s) Performed: Procedure(s) (LRB):  ERCP (N/A)    Final Anesthesia Type: general  Patient location during evaluation: PACU  Patient participation: Yes- Able to Participate  Level of consciousness: awake and alert  Post-procedure vital signs: reviewed and stable  Pain management: adequate  Airway patency: patent  PONV status at discharge: No PONV  Anesthetic complications: no      Cardiovascular status: blood pressure returned to baseline  Respiratory status: unassisted  Hydration status: euvolemic  Follow-up not needed.        Visit Vitals  /60   Pulse 94   Temp 36.5 °C (97.7 °F) (Temporal)   Resp 18   Ht 5' 3" (1.6 m)   Wt 54.6 kg (120 lb 6.4 oz)   LMP  (LMP Unknown)   SpO2 100%   Breastfeeding? No   BMI 21.33 kg/m²       Pain/Jarocho Score: Pain Assessment Performed: Yes (1/9/2018 12:45 PM)  Presence of Pain: denies (1/9/2018 12:45 PM)  Jarocho Score: 10 (1/9/2018 12:45 PM)      "

## 2018-01-09 NOTE — DISCHARGE SUMMARY
Discharge Summary/Instructions after an Endoscopic Procedure    Patient Name: Malina Robles  Patient MRN: [id number]  Patient YOB: 1960    Tuesday, January 09, 2018  Silverio Montano MD    RESTRICTIONS:  During your procedure today, you received medications for sedation.  These medications may affect your judgment, balance and coordination.  Therefore, for 24 hours, you have the following restrictions:     - DO NOT drive a car, operate machinery, make legal/financial decisions, sign important papers or drink alcohol.      ACTIVITY:  The following day: return to full activity including work, except no heavy lifting, straining or running for 3 days if polyps were removed.    DIET:  Eat and drink normally unless instructed otherwise.     TREATMENT FOR COMMON SIDE EFFECTS:  - Mild abdominal pain, belching, bloating or excessive gas: rest, eat lightly and use a heating pad.  - Sore Throat: treat with throat lozenges and/or gargle with warm salt water.    SYMPTOMS TO WATCH FOR AND REPORT TO YOUR PHYSICIAN:  1. Abdominal pain or bloating, other than gas cramps.  2. Chest pain.  3. Back pain.  4. Chills or fever occurring within 24 hours after the procedure.  5. Rectal bleeding, which would show as bright red, maroon, or black stools. (A tablespoon of blood from the rectum is not serious, especially if hemorrhoids are present.)  6. Vomiting.  7. Weakness or dizziness.  8. Because air was used during the procedure, expelling large amounts of air from your rectum or belching is normal.  9. If a bowel prep was taken, you may not have a bowel movement for 1-3 days.  This is normal.    GO DIRECTLY TO THE NEAREST EMERGENCY ROOM IF YOU HAVE ANY OF THE FOLLOWING:     Difficulty breathing  Chills and/or fever over 101 F   Persistent vomiting and/or vomiting blood   Severe abdominal pain   Severe chest pain   Black, tarry stools   Bleeding- more than one tablespoon   Any other symptom or condition that you may feel needs  urgent attention    Your doctor recommends these additional instructions:  If any biopsies were taken, your doctor s clinic will contact you in 1 to 2 weeks with any results.    You are being discharged to home.   Watch for symptoms of pancreatitis, bleeding, perforation and cholangitis.   Your physician has recommended a repeat ERCP in eight weeks for retreatment.   Take Cipro (ciprofloxacin) 500 mg by mouth twice a day for five days.    For questions, problems or results please call your physician - Silverio Montano MD at Work:  [phone].    [facility], EMERGENCY ROOM PHONE NUMBER: [phone]    IF A COMPLICATION OR EMERGENCY SITUATION ARISES AND YOU ARE UNABLE TO REACH YOUR PHYSICIAN - GO DIRECTLY TO THE EMERGENCY ROOM.

## 2018-01-09 NOTE — H&P
History & Physical - Short Stay  Gastroenterology      SUBJECTIVE:     Procedure: ERCP    Chief Complaint/Indication for Procedure: Choledocholithiasis    History of Present Illness:  Patient is a 57 y.o. female presents for therapy of choledocholithiasis. She is S/P ERCP with stent placement.    PTA Medications   Medication Sig    albuterol sulfate 90 mcg/actuation AePB Inhale 180 mcg into the lungs every 6 (six) hours as needed. Rescue    amlodipine (NORVASC) 10 MG tablet Take 1 tablet (10 mg total) by mouth once daily.    aspirin (ECOTRIN) 81 MG EC tablet Take 81 mg by mouth once daily.    clopidogrel (PLAVIX) 75 mg tablet Take 75 mg by mouth once daily.    docusate sodium (COLACE) 100 MG capsule Take 100 mg by mouth 2 (two) times daily.    ergocalciferol (VITAMIN D2) 50,000 unit Cap Take 50,000 Units by mouth every 7 days.    fish oil-omega-3 fatty acids 300-1,000 mg capsule Take 1 g by mouth 2 (two) times daily.     fluoxetine (PROZAC) 40 MG capsule Take 40 mg by mouth once daily.    hydrochlorothiazide (HYDRODIURIL) 12.5 MG Tab Take 1 tablet (12.5 mg total) by mouth once daily.    IRON/FA/DHA/EPA/FAD/NADH/MV47 (ENLYTE, FERROUS GLYCINE, ORAL) Take 30 mg by mouth once daily at 6am.    lactulose (CEPHULAC) 10 gram packet Take 10 g by mouth 3 (three) times daily.    levothyroxine (SYNTHROID) 25 MCG tablet Take 25 mcg by mouth once daily.    nitroGLYCERIN (NITROSTAT) 0.4 MG SL tablet Place 0.4 mg under the tongue every 5 (five) minutes as needed.    olanzapine (ZYPREXA) 20 MG tablet Take 7.5 mg by mouth every evening.     pantoprazole (PROTONIX) 40 MG tablet Take 40 mg by mouth once daily.    pravastatin (PRAVACHOL) 40 MG tablet Take 1 tablet (40 mg total) by mouth every evening.    sodium bicarbonate 650 MG tablet Take 1 tablet (650 mg total) by mouth 3 (three) times daily.    tiotropium (SPIRIVA) 18 mcg inhalation capsule Inhale 18 mcg into the lungs once daily.    tramadol (ULTRAM) 50 mg  tablet Take 50 mg by mouth every 6 (six) hours as needed for Pain.    valproate (DEPAKENE) 250 mg/5 mL syrup Take 1,000 mg by mouth 2 (two) times daily.       Review of patient's allergies indicates:  No Known Allergies     Past Medical History:   Diagnosis Date    Angina pectoris     Anorexia     Asthma     Coronary artery disease     Dementia     Depression     Emphysema, unspecified     GERD (gastroesophageal reflux disease)     Hyperlipemia     Hypertension     Stroke     Thrombocytopenia     Thyroid disease      History reviewed. No pertinent surgical history.  History reviewed. No pertinent family history.  Social History   Substance Use Topics    Smoking status: Never Smoker    Smokeless tobacco: Never Used    Alcohol use No       Review of Systems:  Respiratory: no cough or shortness of breath  Cardiovascular: no chest pain or palpitations  Gastrointestinal: no nausea or vomiting, no abdominal pain or change in bowel habits    OBJECTIVE:     Vital Signs (Most Recent)       Physical Exam:  General: well developed, well nourished  Lungs:  normal respiratory effort  Heart: regular rate, S1, S2 normal  Abdomen: soft, non-tender non-distented; bowel sounds normal; no masses,  no organomegaly    Laboratory  CBC: No results for input(s): WBC, RBC, HGB, HCT, PLT, MCV, MCH, MCHC in the last 168 hours.  CMP: No results for input(s): GLU, CALCIUM, ALBUMIN, PROT, NA, K, CO2, CL, BUN, CREATININE, ALKPHOS, ALT, AST, BILITOT in the last 168 hours.  Coagulation: No results for input(s): LABPROT, INR, APTT in the last 168 hours.      Diagnostic Results:      ASSESSMENT/PLAN:     Choledocholithiasis      Plan: ERCP    Anesthesia Plan: MAC    ASA Grade: ASA 3 - Patient with moderate systemic disease with functional limitations      The impression and plan was discussed in detail with the patient. All questions have been answered and the patient voices understanding of our plan at this point. The risk of the  procedure was discussed in detail which includes but not limited to bleeding, infection, perforation in some cases requiring surgery with its spectrum of complications.

## 2018-01-10 ENCOUNTER — TELEPHONE (OUTPATIENT)
Dept: GASTROENTEROLOGY | Facility: CLINIC | Age: 58
End: 2018-01-10

## 2018-01-10 DIAGNOSIS — K80.50 COMMON BILE DUCT STONE: Primary | ICD-10-CM

## 2018-01-18 ENCOUNTER — TELEPHONE (OUTPATIENT)
Dept: GASTROENTEROLOGY | Facility: CLINIC | Age: 58
End: 2018-01-18

## 2018-01-18 NOTE — TELEPHONE ENCOUNTER
Spoke with Wendi at [facility]. ERCP scheduled for 3/15 at 10a. Reviewed prep instructions. Will fax those instructions to [phone]

## 2018-03-01 ENCOUNTER — TELEPHONE (OUTPATIENT)
Dept: ENDOSCOPY | Facility: HOSPITAL | Age: 58
End: 2018-03-01

## 2018-03-01 NOTE — TELEPHONE ENCOUNTER
Faxed Dr Jose Rios (ph [phone]/fx [phone]) requesting permission to hold Plavix for 5 days prior to ERCP scheduled 3/15/18.

## 2018-03-06 ENCOUNTER — TELEPHONE (OUTPATIENT)
Dept: ENDOSCOPY | Facility: HOSPITAL | Age: 58
End: 2018-03-06

## 2018-03-06 NOTE — TELEPHONE ENCOUNTER
Received fax from Dr Jose Rios giving permission to hold Plavix for 5 days prior to ERCP scheduled 3/15/18 at 1000.  Copy scanned to EPIC.  Spoke with Mary/TUAN at [facility] about procedure.  Faxed instructions to [facility] [place] attn: Katherine/NIYAH, Sangeetha/TUAN and Wendi/Scheduling.

## 2018-03-14 ENCOUNTER — ANESTHESIA EVENT (OUTPATIENT)
Dept: ENDOSCOPY | Facility: HOSPITAL | Age: 58
End: 2018-03-14
Payer: MEDICAID

## 2018-03-15 ENCOUNTER — SURGERY (OUTPATIENT)
Age: 58
End: 2018-03-15

## 2018-03-15 ENCOUNTER — HOSPITAL ENCOUNTER (OUTPATIENT)
Facility: HOSPITAL | Age: 58
Discharge: HOME | End: 2018-03-15
Attending: INTERNAL MEDICINE | Admitting: INTERNAL MEDICINE
Payer: MEDICAID

## 2018-03-15 ENCOUNTER — ANESTHESIA (OUTPATIENT)
Dept: ENDOSCOPY | Facility: HOSPITAL | Age: 58
End: 2018-03-15
Payer: MEDICAID

## 2018-03-15 VITALS
BODY MASS INDEX: 20.73 KG/M2 | RESPIRATION RATE: 15 BRPM | SYSTOLIC BLOOD PRESSURE: 145 MMHG | OXYGEN SATURATION: 99 % | HEIGHT: 63 IN | HEART RATE: 75 BPM | WEIGHT: 117 LBS | TEMPERATURE: 99 F | DIASTOLIC BLOOD PRESSURE: 85 MMHG

## 2018-03-15 DIAGNOSIS — K80.50 CHOLEDOCHOLITHIASIS: Primary | ICD-10-CM

## 2018-03-15 PROCEDURE — 27201702 HC BASKET, RETRIEVAL/LITHOTRIPTOR: Performed by: INTERNAL MEDICINE

## 2018-03-15 PROCEDURE — 43273 ENDOSCOPIC PANCREATOSCOPY: CPT | Mod: ,,, | Performed by: INTERNAL MEDICINE

## 2018-03-15 PROCEDURE — 27000221 HC OXYGEN, UP TO 24 HOURS

## 2018-03-15 PROCEDURE — 25000003 PHARM REV CODE 250: Performed by: NURSE ANESTHETIST, CERTIFIED REGISTERED

## 2018-03-15 PROCEDURE — 27201275 HC CATHETER, SPYSCOPE: Performed by: INTERNAL MEDICINE

## 2018-03-15 PROCEDURE — C1769 GUIDE WIRE: HCPCS | Performed by: INTERNAL MEDICINE

## 2018-03-15 PROCEDURE — D9220A PRA ANESTHESIA: Mod: ANES,,, | Performed by: ANESTHESIOLOGY

## 2018-03-15 PROCEDURE — 63600175 PHARM REV CODE 636 W HCPCS: Performed by: NURSE ANESTHETIST, CERTIFIED REGISTERED

## 2018-03-15 PROCEDURE — 43265 ERCP LITHOTRIPSY CALCULI: CPT | Mod: ,,, | Performed by: INTERNAL MEDICINE

## 2018-03-15 PROCEDURE — 27201089 HC SNARE, DISP (ANY): Performed by: INTERNAL MEDICINE

## 2018-03-15 PROCEDURE — 74328 X-RAY BILE DUCT ENDOSCOPY: CPT | Mod: 26,,, | Performed by: INTERNAL MEDICINE

## 2018-03-15 PROCEDURE — 74328 X-RAY BILE DUCT ENDOSCOPY: CPT | Performed by: INTERNAL MEDICINE

## 2018-03-15 PROCEDURE — 25000003 PHARM REV CODE 250: Performed by: INTERNAL MEDICINE

## 2018-03-15 PROCEDURE — 94761 N-INVAS EAR/PLS OXIMETRY MLT: CPT

## 2018-03-15 PROCEDURE — 37000008 HC ANESTHESIA 1ST 15 MINUTES: Performed by: INTERNAL MEDICINE

## 2018-03-15 PROCEDURE — 37000009 HC ANESTHESIA EA ADD 15 MINS: Performed by: INTERNAL MEDICINE

## 2018-03-15 PROCEDURE — 43265 ERCP LITHOTRIPSY CALCULI: CPT | Performed by: INTERNAL MEDICINE

## 2018-03-15 PROCEDURE — D9220A PRA ANESTHESIA: Mod: CRNA,,, | Performed by: NURSE ANESTHETIST, CERTIFIED REGISTERED

## 2018-03-15 PROCEDURE — 43273 ENDOSCOPIC PANCREATOSCOPY: CPT | Performed by: INTERNAL MEDICINE

## 2018-03-15 PROCEDURE — 27202125 HC BALLOON, EXTRACTION (ANY): Performed by: INTERNAL MEDICINE

## 2018-03-15 RX ORDER — CIPROFLOXACIN 2 MG/ML
INJECTION, SOLUTION INTRAVENOUS
Status: DISCONTINUED | OUTPATIENT
Start: 2018-03-15 | End: 2018-03-15

## 2018-03-15 RX ORDER — CIPROFLOXACIN 500 MG/1
500 TABLET ORAL EVERY 12 HOURS
Qty: 10 TABLET | Refills: 0 | Status: SHIPPED | OUTPATIENT
Start: 2018-03-15 | End: 2018-03-25

## 2018-03-15 RX ORDER — SODIUM CHLORIDE 0.9 % (FLUSH) 0.9 %
3 SYRINGE (ML) INJECTION
Status: DISCONTINUED | OUTPATIENT
Start: 2018-03-15 | End: 2018-03-15 | Stop reason: HOSPADM

## 2018-03-15 RX ORDER — LIDOCAINE HYDROCHLORIDE 10 MG/ML
INJECTION, SOLUTION INTRAVENOUS
Status: DISCONTINUED | OUTPATIENT
Start: 2018-03-15 | End: 2018-03-15

## 2018-03-15 RX ORDER — SODIUM CHLORIDE 9 MG/ML
INJECTION, SOLUTION INTRAVENOUS CONTINUOUS
Status: DISCONTINUED | OUTPATIENT
Start: 2018-03-15 | End: 2018-03-15 | Stop reason: HOSPADM

## 2018-03-15 RX ORDER — INDOMETHACIN 50 MG/1
SUPPOSITORY RECTAL
Status: COMPLETED | OUTPATIENT
Start: 2018-03-15 | End: 2018-03-15

## 2018-03-15 RX ORDER — FENTANYL CITRATE 50 UG/ML
INJECTION, SOLUTION INTRAMUSCULAR; INTRAVENOUS
Status: DISCONTINUED | OUTPATIENT
Start: 2018-03-15 | End: 2018-03-15

## 2018-03-15 RX ORDER — ROCURONIUM BROMIDE 10 MG/ML
INJECTION, SOLUTION INTRAVENOUS
Status: DISCONTINUED | OUTPATIENT
Start: 2018-03-15 | End: 2018-03-15

## 2018-03-15 RX ORDER — PROPOFOL 10 MG/ML
VIAL (ML) INTRAVENOUS
Status: DISCONTINUED | OUTPATIENT
Start: 2018-03-15 | End: 2018-03-15

## 2018-03-15 RX ORDER — NEOSTIGMINE METHYLSULFATE 1 MG/ML
INJECTION, SOLUTION INTRAVENOUS
Status: DISCONTINUED | OUTPATIENT
Start: 2018-03-15 | End: 2018-03-15

## 2018-03-15 RX ORDER — GLYCOPYRROLATE 0.2 MG/ML
INJECTION INTRAMUSCULAR; INTRAVENOUS
Status: DISCONTINUED | OUTPATIENT
Start: 2018-03-15 | End: 2018-03-15

## 2018-03-15 RX ORDER — ONDANSETRON 2 MG/ML
INJECTION INTRAMUSCULAR; INTRAVENOUS
Status: DISCONTINUED | OUTPATIENT
Start: 2018-03-15 | End: 2018-03-15

## 2018-03-15 RX ORDER — CIPROFLOXACIN 2 MG/ML
400 INJECTION, SOLUTION INTRAVENOUS ONCE
Status: DISCONTINUED | OUTPATIENT
Start: 2018-03-15 | End: 2018-03-15 | Stop reason: HOSPADM

## 2018-03-15 RX ORDER — SODIUM CHLORIDE 9 MG/ML
INJECTION, SOLUTION INTRAVENOUS CONTINUOUS PRN
Status: DISCONTINUED | OUTPATIENT
Start: 2018-03-15 | End: 2018-03-15

## 2018-03-15 RX ADMIN — ROCURONIUM BROMIDE 30 MG: 10 INJECTION, SOLUTION INTRAVENOUS at 11:03

## 2018-03-15 RX ADMIN — NEOSTIGMINE METHYLSULFATE 4 MG: 1 INJECTION INTRAVENOUS at 12:03

## 2018-03-15 RX ADMIN — GLYCOPYRROLATE 0.4 MG: 0.2 INJECTION, SOLUTION INTRAMUSCULAR; INTRAVENOUS at 12:03

## 2018-03-15 RX ADMIN — SODIUM CHLORIDE: 0.9 INJECTION, SOLUTION INTRAVENOUS at 11:03

## 2018-03-15 RX ADMIN — SODIUM CHLORIDE: 0.9 INJECTION, SOLUTION INTRAVENOUS at 09:03

## 2018-03-15 RX ADMIN — LIDOCAINE HYDROCHLORIDE 50 MG: 10 INJECTION, SOLUTION INTRAVENOUS at 11:03

## 2018-03-15 RX ADMIN — PROPOFOL 100 MG: 10 INJECTION, EMULSION INTRAVENOUS at 11:03

## 2018-03-15 RX ADMIN — CIPROFLOXACIN 400 MG: 2 INJECTION, SOLUTION INTRAVENOUS at 12:03

## 2018-03-15 RX ADMIN — INDOMETHACIN 100 MG: 50 SUPPOSITORY RECTAL at 12:03

## 2018-03-15 RX ADMIN — FENTANYL CITRATE 50 MCG: 50 INJECTION, SOLUTION INTRAMUSCULAR; INTRAVENOUS at 11:03

## 2018-03-15 RX ADMIN — FENTANYL CITRATE 50 MCG: 50 INJECTION, SOLUTION INTRAMUSCULAR; INTRAVENOUS at 12:03

## 2018-03-15 RX ADMIN — ONDANSETRON 4 MG: 2 INJECTION INTRAMUSCULAR; INTRAVENOUS at 12:03

## 2018-03-15 NOTE — ANESTHESIA PREPROCEDURE EVALUATION
03/15/2018  Malina Robles is a 58 y.o., female.    Anesthesia Evaluation    I have reviewed the Patient Summary Reports.        Review of Systems  Anesthesia Hx:  No problems with previous Anesthesia    Social:  Non-Smoker    Hematology/Oncology:  Hematology Normal   Oncology Normal     EENT/Dental:EENT/Dental Normal   Cardiovascular:   Hypertension CAD   Angina    Pulmonary:   COPD Asthma    Renal/:   Chronic Renal Disease    Hepatic/GI:   GERD    Musculoskeletal:  Musculoskeletal Normal    Neurological:   CVA    Endocrine:   Hypothyroidism    Dermatological:  Skin Normal    Psych:   Psychiatric History          Physical Exam  General:  Well nourished    Airway/Jaw/Neck:  Airway Findings: Mouth Opening: Normal Tongue: Normal  General Airway Assessment: Adult  Mallampati: II  Improves to II with phonation.  TM Distance: Normal, at least 6 cm  Jaw/Neck Findings:  Neck ROM: Normal ROM      Dental:  Dental Findings: In tact   Chest/Lungs:  Chest/Lungs Findings: Clear to auscultation, Normal Respiratory Rate     Heart/Vascular:  Heart Findings: Rate: Normal  Rhythm: Regular Rhythm  Sounds: Normal             Anesthesia Plan  Type of Anesthesia, risks & benefits discussed:  Anesthesia Type:  general  Patient's Preference: General  Intra-op Monitoring Plan:   Intra-op Monitoring Plan Comments:   Post Op Pain Control Plan:   Post Op Pain Control Plan Comments:   Induction:   IV  Beta Blocker:  Patient is not currently on a Beta-Blocker (No further documentation required).       Informed Consent: Patient understands risks and agrees with Anesthesia plan.  Questions answered. Anesthesia consent signed with patient.  ASA Score: 3     Day of Surgery Review of History & Physical: I have interviewed and examined the patient. I have reviewed the patient's H&P dated:  There are no significant changes.          Ready  For Surgery From Anesthesia Perspective.

## 2018-03-15 NOTE — DISCHARGE INSTRUCTIONS
Discharge Instructions for ERCP (Endoscopic Retrograde Cholangiopancreatography)  You had a procedure known as an ERCP. Your healthcare provider performed the ERCP to look at your bile or pancreatic ducts, and to locate and treat blockages in the ducts. This procedure is used to diagnose diseases of the pancreas, bile ducts, and pancreatic duct, liver, and gallbladder. Heres what you need to do following your ERCP.  Home care  · Dont take aspirin or any other blood-thinning medicines (anticoagulants) until your provider says its OK.  · Your provider may prescribe an antibiotic, depending on what was done during the ERCP.  · You may have a sore throat for 1 to 2 days after the procedure. Use lozenges or gargle with salt water for your sore throat. If you're not better in a few days, call your provider.  · Rest, drink fluids, and eat light meals. If you feel bloated or have too much gas, use a heating pad on your belly to help reduce the discomfort. This should help you feel better. But if it doesn't, call your provider.  · Dont drink alcohol for 2 days after the procedure.  Follow-up care  Make a follow-up appointment as directed by our staff.     When to seek medical care  Call your provider right away if you have any of the following:  · Trouble swallowing or throat pain that gets worse   · Chest pain or severe belly or abdominal pain  · Fever above 100°F (37.7°C) or chills  · Upset stomach (nausea) and vomiting  · Black or tarry stools

## 2018-03-15 NOTE — DISCHARGE SUMMARY
Discharge Summary/Instructions after an Endoscopic Procedure    Patient Name: Malina Robles  Patient MRN: [id number]  Patient YOB: 1960    Thursday, March 15, 2018  Silverio Montano MD    RESTRICTIONS:  During your procedure today, you received medications for sedation.  These medications may affect your judgment, balance and coordination.  Therefore, for 24 hours, you have the following restrictions:     - DO NOT drive a car, operate machinery, make legal/financial decisions, sign important papers or drink alcohol.      ACTIVITY:  The following day: return to full activity including work, except no heavy lifting, straining or running for 3 days if polyps were removed.    DIET:  Eat and drink normally unless instructed otherwise.     TREATMENT FOR COMMON SIDE EFFECTS:  - Mild abdominal pain, nausea, belching, bloating or excessive gas:  rest, eat lightly and use a heating pad.  - Sore Throat: treat with throat lozenges and/or gargle with warm salt water.  - Because air was used during the procedure, expelling large amounts of air from your rectum or belching is normal.  - If a bowel prep was taken, you may not have a bowel movement for 1-3 days.  This is normal.      SYMPTOMS TO WATCH FOR AND REPORT TO YOUR PHYSICIAN:  1. Abdominal pain or bloating, other than gas cramps.  2. Chest pain.  3. Back pain.  4. Signs of infection such as: chills or fever occurring within 24 hours after the procedure.  5. Rectal bleeding, which would show as bright red, maroon, or black stools. (A tablespoon of blood from the rectum is not serious, especially if hemorrhoids are present.)  6. Vomiting.  7. Weakness or dizziness.      GO DIRECTLY TO THE NEAREST EMERGENCY ROOM IF YOU HAVE ANY OF THE FOLLOWING:     Difficulty breathing  Chills and/or fever over 101 F   Persistent vomiting and/or vomiting blood   Severe abdominal pain   Severe chest pain   Black, tarry stools   Bleeding- more than one tablespoon   Any other symptom or  condition that you feel may need urgent attention    Your doctor recommends these additional instructions:  If any biopsies were taken, your doctors clinic will contact you in 1 to 2 weeks with any results.    You are being discharged to home.   Watch for symptoms of pancreatitis, bleeding, perforation and cholangitis.   Your clinical course will be observed.   Take Cipro (ciprofloxacin) 500 mg by mouth twice a day for five days.    For questions, problems or results please call your physician - Silverio Montano MD at Work:  [phone].    [facility], EMERGENCY ROOM PHONE NUMBER: [phone]    IF A COMPLICATION OR EMERGENCY SITUATION ARISES AND YOU ARE UNABLE TO REACH YOUR PHYSICIAN - GO DIRECTLY TO THE EMERGENCY ROOM.

## 2018-03-15 NOTE — PROVATION PATIENT INSTRUCTIONS
Discharge Summary/Instructions after an Endoscopic Procedure  Patient Name: Malina Robles  Patient MRN: [id number]  Patient YOB: 1960  Thursday, March 15, 2018  Silverio Montano MD  RESTRICTIONS:  During your procedure today, you received medications for sedation.  These   medications may affect your judgment, balance and coordination.  Therefore,   for 24 hours, you have the following restrictions:   - DO NOT drive a car, operate machinery, make legal/financial decisions,   sign important papers or drink alcohol.    ACTIVITY:  The following day: return to full activity including work, except no heavy   lifting, straining or running for 3 days if polyps were removed.  DIET:  Eat and drink normally unless instructed otherwise.     TREATMENT FOR COMMON SIDE EFFECTS:  - Mild abdominal pain, nausea, belching, bloating or excessive gas:  rest,   eat lightly and use a heating pad.  - Sore Throat: treat with throat lozenges and/or gargle with warm salt   water.  - Because air was used during the procedure, expelling large amounts of air   from your rectum or belching is normal.  - If a bowel prep was taken, you may not have a bowel movement for 1-3 days.    This is normal.  SYMPTOMS TO WATCH FOR AND REPORT TO YOUR PHYSICIAN:  1. Abdominal pain or bloating, other than gas cramps.  2. Chest pain.  3. Back pain.  4. Signs of infection such as: chills or fever occurring within 24 hours   after the procedure.  5. Rectal bleeding, which would show as bright red, maroon, or black stools.   (A tablespoon of blood from the rectum is not serious, especially if   hemorrhoids are present.)  6. Vomiting.  7. Weakness or dizziness.  GO DIRECTLY TO THE NEAREST EMERGENCY ROOM IF YOU HAVE ANY OF THE FOLLOWING:      Difficulty breathing  Chills and/or fever over 101 F   Persistent vomiting and/or vomiting blood   Severe abdominal pain   Severe chest pain   Black, tarry stools   Bleeding- more than one tablespoon   Any other  symptom or condition that you feel may need urgent attention  Your doctor recommends these additional instructions:  If any biopsies were taken, your doctors clinic will contact you in 1 to 2   weeks with any results.  You are being discharged to home.   Watch for symptoms of pancreatitis, bleeding, perforation and cholangitis.   Your clinical course will be observed.   Take Cipro (ciprofloxacin) 500 mg by mouth twice a day for five days.  For questions, problems or results please call your physician - Silverio Montano MD at Work:  [phone].  [facility], EMERGENCY ROOM PHONE NUMBER: [phone]  IF A COMPLICATION OR EMERGENCY SITUATION ARISES AND YOU ARE UNABLE TO REACH   YOUR PHYSICIAN - GO DIRECTLY TO THE EMERGENCY ROOM.  Silverio Montano MD  3/15/2018 12:47:57 PM  This report has been verified and signed electronically.

## 2018-03-15 NOTE — PLAN OF CARE
POC reviewed with pt, acknowledged understanding. Pt oriented x 4, slightly drowsy. Pt remains free of falls/injuries. VSS. Pt on telemetry remains SR. Pt tolerating npo diet. Pt denies pain. Pt alysa score 9/10. No acute events throughout shift. No distress noted, will continue to monitor.WCTM until pt is transported to [facility].

## 2018-03-15 NOTE — OR NURSING
"Pt oriented to self, place, she is alert and follows commands, and answers questions appropriately. Pt verbalizes understanding of procedure "I'm here for a check up". This procedure has been performed several times at this facility. Pt does normally consent for herself and has consented for herself today. RN at home facility states this is her norm and she consents for herself. Anes. Doctor and Dr. Montano are comfortable with proceeding with the case.   "

## 2018-03-15 NOTE — H&P
History & Physical - Short Stay  Gastroenterology      SUBJECTIVE:     Procedure: ERCP    Chief Complaint/Indication for Procedure: Choledocholithiasis    History of Present Illness:  Patient is a 58 y.o. female presents with choledocholithiasis S/P ERCP with lithotripsy and biliary stent placement here for stent removal and possible retreatment.     PTA Medications   Medication Sig    albuterol sulfate 90 mcg/actuation AePB Inhale 180 mcg into the lungs every 6 (six) hours as needed. Rescue    amlodipine (NORVASC) 10 MG tablet Take 1 tablet (10 mg total) by mouth once daily.    aspirin (ECOTRIN) 81 MG EC tablet Take 81 mg by mouth once daily.    docusate sodium (COLACE) 100 MG capsule Take 100 mg by mouth 2 (two) times daily.    ergocalciferol (VITAMIN D2) 50,000 unit Cap Take 50,000 Units by mouth every 7 days.    fish oil-omega-3 fatty acids 300-1,000 mg capsule Take 1 g by mouth 2 (two) times daily.     fluoxetine (PROZAC) 40 MG capsule Take 40 mg by mouth once daily.    hydrochlorothiazide (HYDRODIURIL) 12.5 MG Tab Take 1 tablet (12.5 mg total) by mouth once daily.    IRON/FA/DHA/EPA/FAD/NADH/MV47 (ENLYTE, FERROUS GLYCINE, ORAL) Take 30 mg by mouth once daily at 6am.    lactulose (CEPHULAC) 10 gram packet Take 10 g by mouth 3 (three) times daily.    levothyroxine (SYNTHROID) 25 MCG tablet Take 25 mcg by mouth once daily.    nitroGLYCERIN (NITROSTAT) 0.4 MG SL tablet Place 0.4 mg under the tongue every 5 (five) minutes as needed.    olanzapine (ZYPREXA) 20 MG tablet Take 7.5 mg by mouth every evening.     pantoprazole (PROTONIX) 40 MG tablet Take 40 mg by mouth once daily.    pravastatin (PRAVACHOL) 40 MG tablet Take 1 tablet (40 mg total) by mouth every evening.    sodium bicarbonate 650 MG tablet Take 1 tablet (650 mg total) by mouth 3 (three) times daily.    tiotropium (SPIRIVA) 18 mcg inhalation capsule Inhale 18 mcg into the lungs once daily.    tramadol (ULTRAM) 50 mg tablet Take 50 mg by  mouth every 6 (six) hours as needed for Pain.    valproate (DEPAKENE) 250 mg/5 mL syrup Take 1,000 mg by mouth 2 (two) times daily.    clopidogrel (PLAVIX) 75 mg tablet Take 75 mg by mouth once daily.       Review of patient's allergies indicates:  No Known Allergies     Past Medical History:   Diagnosis Date    Angina pectoris     Anorexia     Asthma     Coronary artery disease     Dementia     Depression     Emphysema, unspecified     GERD (gastroesophageal reflux disease)     Hyperlipemia     Hypertension     Stroke     Thrombocytopenia     Thyroid disease      History reviewed. No pertinent surgical history.  History reviewed. No pertinent family history.  Social History   Substance Use Topics    Smoking status: Never Smoker    Smokeless tobacco: Never Used    Alcohol use No       Review of Systems:  Constitutional: no fever or chills  Respiratory: no cough or shortness of breath  Cardiovascular: no chest pain or palpitations  Gastrointestinal: no nausea or vomiting, no abdominal pain or change in bowel habits    OBJECTIVE:     Vital Signs (Most Recent)  Temp: 98.2 °F (36.8 °C) (03/15/18 0947)  Pulse: 93 (03/15/18 0947)  Resp: 18 (03/15/18 0947)  BP: (!) 162/95 (03/15/18 0947)  SpO2: 100 % (03/15/18 0947)    Physical Exam:  General: well developed, well nourished  Lungs:  normal respiratory effort  Heart: regular rate, S1, S2 normal  Abdomen: soft, non-tender non-distented; bowel sounds normal; no masses,  no organomegaly    Laboratory  CBC: No results for input(s): WBC, RBC, HGB, HCT, PLT, MCV, MCH, MCHC in the last 168 hours.  CMP: No results for input(s): GLU, CALCIUM, ALBUMIN, PROT, NA, K, CO2, CL, BUN, CREATININE, ALKPHOS, ALT, AST, BILITOT in the last 168 hours.  Coagulation: No results for input(s): LABPROT, INR, APTT in the last 168 hours.      Diagnostic Results:      ASSESSMENT/PLAN:     Choledocholithiasis s/P ERCP with lithotripsy and stent placement      Plan: ERCP    Anesthesia  Plan: MAC    ASA Grade: ASA 3 - Patient with moderate systemic disease with functional limitations     The impression and plan was discussed in detail with the patient. All questions have been answered and the patient voices understanding of our plan at this point. The risk of the procedure was discussed in detail which includes but not limited to bleeding, infection, perforation in some cases requiring surgery with its spectrum of complications.

## 2018-03-15 NOTE — TRANSFER OF CARE
"Anesthesia Transfer of Care Note    Patient: Malina Robles    Procedure(s) Performed: Procedure(s) (LRB):  ERCP (N/A)    Patient location: PACU    Anesthesia Type: general    Transport from OR: Transported from OR on room air with adequate spontaneous ventilation. Transported from OR on 6-10 L/min O2 by face mask with adequate spontaneous ventilation    Post pain: adequate analgesia    Post assessment: tolerated procedure well and no apparent anesthetic complications    Post vital signs: stable    Level of consciousness: awake and alert    Nausea/Vomiting: no nausea/vomiting    Complications: none    Transfer of care protocol was followed      Last vitals:   Visit Vitals  /89   Pulse 86   Temp 36.7 °C (98.1 °F) (Temporal)   Resp 18   Ht 5' 3" (1.6 m)   Wt 53.1 kg (117 lb)   LMP  (LMP Unknown)   SpO2 100%   Breastfeeding? No   BMI 20.73 kg/m²     "

## 2018-03-16 NOTE — ANESTHESIA POSTPROCEDURE EVALUATION
"Anesthesia Post Evaluation    Patient: Malina Robles    Procedure(s) Performed: Procedure(s) (LRB):  ERCP (N/A)    Final Anesthesia Type: general  Patient location during evaluation: PACU  Patient participation: Yes- Able to Participate  Level of consciousness: awake and alert  Post-procedure vital signs: reviewed and stable  Pain management: adequate  Airway patency: patent  PONV status at discharge: No PONV  Anesthetic complications: no      Cardiovascular status: blood pressure returned to baseline and stable  Respiratory status: unassisted  Hydration status: euvolemic  Follow-up not needed.        Visit Vitals  BP (!) 145/85   Pulse 75   Temp 37.3 °C (99.1 °F) (Temporal)   Resp 15   Ht 5' 3" (1.6 m)   Wt 53.1 kg (117 lb)   LMP  (LMP Unknown)   SpO2 99%   Breastfeeding? No   BMI 20.73 kg/m²       Pain/Jarocho Score: Pain Assessment Performed: Yes (3/15/2018  3:00 PM)  Presence of Pain: denies (3/15/2018  3:00 PM)  Jarocho Score: 10 (3/15/2018  3:00 PM)  Modified Jarocho Score: 19 (3/15/2018  3:00 PM)      "

## 2018-12-29 PROBLEM — N30.00 ACUTE CYSTITIS WITHOUT HEMATURIA: Status: ACTIVE | Noted: 2018-12-29

## 2018-12-30 PROBLEM — E87.0 HYPERNATREMIA: Status: ACTIVE | Noted: 2018-12-30

## 2018-12-30 PROBLEM — G93.41 ENCEPHALOPATHY, METABOLIC: Status: ACTIVE | Noted: 2018-12-30

## 2018-12-31 PROBLEM — I63.9 CVA (CEREBRAL VASCULAR ACCIDENT): Status: ACTIVE | Noted: 2018-12-31

## 2019-01-02 PROBLEM — R13.19 DYSPHAGIA, NEUROLOGIC: Status: ACTIVE | Noted: 2017-07-29

## 2019-01-02 PROBLEM — E63.9 INADEQUATE DIETARY ENERGY INTAKE: Status: ACTIVE | Noted: 2019-01-02

## 2019-03-23 PROBLEM — N12 PYELONEPHRITIS: Status: ACTIVE | Noted: 2019-03-23

## 2019-03-24 PROBLEM — B99.9 INFECTIOUS ENCEPHALOPATHY: Status: ACTIVE | Noted: 2017-08-19

## 2019-03-24 PROBLEM — G93.49 INFECTIOUS ENCEPHALOPATHY: Status: ACTIVE | Noted: 2017-08-19

## 2019-03-24 PROBLEM — R50.9 FEVER: Status: ACTIVE | Noted: 2019-03-24

## 2019-03-25 PROBLEM — L89.154 PRESSURE INJURY OF SACRAL REGION, STAGE 4: Status: ACTIVE | Noted: 2019-03-25

## 2019-03-25 PROBLEM — R63.8 INCREASED NUTRITIONAL NEEDS: Status: ACTIVE | Noted: 2019-03-25

## 2019-03-25 PROBLEM — L89.610 PRESSURE INJURY OF RIGHT HEEL, UNSTAGEABLE: Status: ACTIVE | Noted: 2019-03-25

## 2019-03-25 PROBLEM — R32 INCONTINENCE: Status: ACTIVE | Noted: 2019-03-25

## 2019-05-23 PROBLEM — J69.0 ASPIRATION PNEUMONIA DUE TO REGURGITATED FOOD: Status: ACTIVE | Noted: 2019-05-23

## 2019-05-24 PROBLEM — J96.01 ACUTE HYPOXEMIC RESPIRATORY FAILURE: Status: ACTIVE | Noted: 2019-05-24

## 2019-05-27 PROBLEM — L89.95 UNSTAGEABLE PRESSURE INJURY OF SKIN AND TISSUE: Status: ACTIVE | Noted: 2019-05-27

## 2019-05-27 PROBLEM — L89.94 PRESSURE INJURY, STAGE 4: Status: ACTIVE | Noted: 2019-05-27

## 2019-06-03 PROBLEM — R06.03 RESPIRATORY DISTRESS: Status: ACTIVE | Noted: 2019-06-03

## 2019-06-03 PROBLEM — D72.829 LEUKOCYTOSIS: Status: ACTIVE | Noted: 2017-07-28

## 2019-06-08 PROBLEM — N39.0 URINARY TRACT INFECTION WITHOUT HEMATURIA: Status: ACTIVE | Noted: 2019-06-08

## 2019-09-06 PROBLEM — S31.000A SACRAL WOUND: Status: ACTIVE | Noted: 2019-09-06

## 2019-09-09 PROBLEM — T14.8XXA DEEP TISSUE INJURY: Status: ACTIVE | Noted: 2019-09-09

## 2020-01-28 NOTE — PT/OT/SLP PROGRESS
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  Pt intubated, however, pt presents awake and alert, but inconsistently following commands    Pain/Comfort  Pain Rating 1: 0/10  Pain Rating Post-Intervention 1: 0/10    Objective:   Patient found with: arterial line, blood pressure cuff, bowel management system, jenkins catheter, NG tube, pressure relief boots, peripheral IV, pulse ox (continuous), restraints, SCD, telemetry, ventilator (Pt found sup in bed with no family present)        FUNCTIONAL MOBILITY    Bed Mobility (with vc's for sequencing and safe technique of functional mobility):        Sup > sit at the EOB NP 2* still intubated and inconsistent with following commands        Pt performs scooting to HOB via bridging with max/total A x2-3 scoot(s)      THERAPEUTIC EXERCISES         Pt receives PROM to R LE sup in bed (ankle DF, HSs, hip add/abd, hip IR/ER)        x10 reps with VCs and TCs         Pt performs L LE PROM/AAROM ther ex's sup in bed (APs, HSs, hip add/abd,              hip IR/ER, SAQ's) x10 reps with VCs and TCs          Education:  Education provided to pt regarding: importance and benefits of performing ther ex's.       AM-PAC 6 CLICK MOBILITY  How much help from another person does this patient currently need?   1 = Unable, Total/Dependent Assistance  2 = A lot, Maximum/Moderate Assistance  3 = A little, Minimum/Contact Guard/Supervision  4 = None, Modified  [place]/Independent    Turning over in bed (including adjusting bedclothes, sheets and blankets)?: 2  Sitting down on and standing up from a chair with arms (e.g., wheelchair, bedside commode, etc.): 1  Moving from lying on back to sitting on the side of the bed?: 1  Moving to and from a bed to a chair (including a wheelchair)?: 1  Need to walk in hospital room?: 1  Climbing 3-5 steps with a railing?: 1  Total Score: 7    AM-PAC Raw Score CMS G-Code Modifier Level of Impairment Assistance   6 % Total / Unable   7 - 9 CM 80 - 100% Maximal Assist   10 - 14 CL 60 - 80% Moderate Assist   15 - 19 CK 40 - 60% Moderate Assist   20 - 22 CJ 20 - 40% Minimal Assist   23 CI 1-20% SBA / CGA   24 CH 0% Independent/ Mod I     Patient left supine with all lines intact, call button in reach, restraints reapplied at end of session and RN notified.    Assessment:  Malina Robles is a 57 y.o. female with a medical diagnosis of Nontraumatic intracerebral hemorrhage in hemisphere, unspecified and presents with R HP, increased R UE/LE tone and cognitive deficits requiring significant assistance and time to perform scooting and ther ex's.  Pt will cont to benefit from skilled PT intervention to address deficits and improve functional mobility.     Rehab identified problem list/impairments: Rehab identified problem list/impairments: weakness, impaired endurance, impaired sensation, impaired self care skills, impaired functional mobilty, impaired balance, impaired cognition, decreased coordination, decreased upper extremity function, decreased lower extremity function, decreased safety awareness, abnormal tone, decreased ROM, impaired coordination, impaired fine motor    Rehab potential is good.    Activity tolerance: Fair    Discharge recommendations: Discharge Facility/Level Of Care Needs:  (return to [facility])     Barriers to discharge: Barriers to Discharge: None    Equipment recommendations: Equipment Needed  After Discharge: none     GOALS:    Physical Therapy Goals        Problem: Physical Therapy Goal    Goal Priority Disciplines Outcome Goal Variances Interventions   Physical Therapy Goal     PT/OT, PT      Description:  Goals to be met by: 8/3/2017     Patient will increase functional independence with mobility by performin. Pt will participate in bilateral UE and LE ROM exercises on 3 consecutive visits with no change in vitals.   2. Pt will perform rolling R and L with moderate assistance  3. Supine to sit with moderate assistance   4. Sit to supine with moderate assistance   5.  Pt will tolerate sitting EOB x 10 minutes with moderate assistance                       PLAN:    Patient to be seen 6 x/week  to address the above listed problems via  (ROM while intubated and progressive mobilization when appropriate)  Plan of Care expires: 17  Plan of Care reviewed with: patient         Swapna Davis, PTA  2017     Yes

## 2020-07-02 ENCOUNTER — HOSPITAL ENCOUNTER (EMERGENCY)
Facility: HOSPITAL | Age: 60
Discharge: ANOTHER HOSPITAL | End: 2020-07-02
Attending: SURGERY
Payer: MEDICAID

## 2020-07-02 VITALS
TEMPERATURE: 97 F | RESPIRATION RATE: 18 BRPM | HEART RATE: 91 BPM | OXYGEN SATURATION: 98 % | DIASTOLIC BLOOD PRESSURE: 78 MMHG | SYSTOLIC BLOOD PRESSURE: 144 MMHG

## 2020-07-02 DIAGNOSIS — K94.23 MALFUNCTION OF PERCUTANEOUS ENDOSCOPIC GASTROSTOMY (PEG) TUBE: Primary | ICD-10-CM

## 2020-07-02 LAB — SARS-COV-2 RDRP RESP QL NAA+PROBE: NEGATIVE

## 2020-07-02 PROCEDURE — 99285 EMERGENCY DEPT VISIT HI MDM: CPT

## 2020-07-02 PROCEDURE — U0002 COVID-19 LAB TEST NON-CDC: HCPCS

## 2020-07-02 NOTE — ED TRIAGE NOTES
60 y.o. female presents to ER ED 06/ED 06   Chief Complaint   Patient presents with    GI Problem   .   Here from [facility] via [facility] for peg tube change

## 2020-07-02 NOTE — ED PROVIDER NOTES
[facility]                                                 Chief Complaint  60 y.o. female with GI Problem      History of Present Illness  Malina Robles presents to the emergency room with clogged PEG tube  Patient was brought here from a nursing home from 20 miles away for this  The request was that the patient see a GI MD for a clogged PEG tube now  We do not have any Gastroenterology at this hospital, tube placed in 2019  Pt had this PEG tube placed at [place] in January 2019 by Dr. Ortiz  It apparently is her only access for nutrition, history of stroke noted in epic    The history is provided by the patient   device was not used during this ER visit    Past Medical History   -- Allergic rhinitis    -- Angina pectoris    -- Anorexia    -- Anorexia    -- Asthma    -- Coronary artery disease    -- Dementia    -- Depression    -- Emphysema, unspecified    -- GERD (gastroesophageal reflux disease)    -- Headache    -- Hyperlipemia    -- Hypertension    -- Insomnia    -- Pressure ulcer    -- Seizures    -- Stroke    -- Thrombocytopenia    -- Thyroid disease    -- Vitamin D deficiency      Surgeries: Cardiac surgery  No Known Allergies     I have reviewed all of this patient's past medical, surgical, family, and social   histories as well as active allergies and medications documented in the  electronic medical record    Review of Systems and Physical Exam      Review of Systems  -- patient actually gives no review of systems    Vital Signs  Her blood pressure is 114/68 and her pulse is 74.   Her respiration is 18 and oxygen saturation is 97%.     Physical Exam  -- Nursing note and vitals reviewed  -- Constitutional: Appears well-developed and well-nourished  -- Head: Atraumatic. Normocephalic. No obvious abnormality  -- Eyes: Pupils are equal and reactive to light. Normal conjunctiva and lids  -- Cardiac: Normal rate, regular rhythm and normal heart sounds  --  Pulmonary: Normal respiratory effort, breath sounds clear to auscultation  -- Abdominal: PEG tube skin site/abdomen clean dry and intact  -- Musculoskeletal: Normal range of motion, no effusions. Joints stable   -- Neurological: No focal deficits. Showed good interaction with staff  -- Vascular: Posterior tibial, dorsalis pedis and radial pulses 2+ bilaterally      Emergency Room Course      ED Physician Management  -- Diagnosis management comments: 60 y.o. female with clogged PEG tube  -- I spoke with the patient's Gastroenterology nurse, Tesha, this morning in clinic  -- this PEG tube could not be removed, it has a permanent inside button/screw in  -- this PEG tube needs a repeat EGD with replacement by visualization by MD  -- she states that Dr. Ortiz is on-call at [place] now, transfer requested  -- has no other form of nutrition, has not gotten any intake in greater than 12 hours    Diagnosis  [K94.23] Malfunction of percutaneous endoscopic gastrostomy (PEG) tube (Primary)    Disposition and Plan  -- Disposition: transfer  -- Condition: stable    This note is dictated on M*Modal word recognition program.  There are word recognition mistakes that are occasionally missed on review.         Jose Cruz MD  07/02/20 0953

## 2020-09-21 NOTE — PLAN OF CARE
07/19/17 1258   Readmission Questionnaire   At the time of your discharge, did someone talk to you about what your health problems were? (Patient transferred to [facility] for ERCP/higher level of care from [facility].)   At the time of discharge, did someone talk to you about what to watch out for regarding worsening of your health problem? (Patient transferred to [facility] for ERCP/higher level of care from [facility].)   At the time of discharge, did someone talk to you about what to do if you experienced worsening of your health problem? (Patient transferred to [facility] for ERCP/higher level of care from [facility].)   At the time of discharge, did someone talk to you about which medication to take when you left the hospital and which ones to stop taking? (Patient transferred to [facility] for ERCP/higher level of care from [facility].)   At the time of discharge, did someone talk to you about when and where to follow up with a doctor after you left the hospital? (Patient transferred to [facility] for ERCP/higher level of care from [facility].)   What do you believe caused you to be sick enough to be re-admitted? (Patient transferred to [facility] for ERCP/higher level of care from [facility].)   How often do you need to have someone help you when you read instructions, pamphlets, or other written material from your doctor or pharmacy? (Patient transferred to [facility] for ERCP/higher level of care from [facility].)   Do you have problems taking your medications as prescribed? (Patient transferred to [facility] for ERCP/higher level of care from [facility].)   Do you have any problems affording any of  your prescribed medications? (Patient transferred to [facility] for ERCP/higher level of care from [facility].)   Do you have problems obtaining/receiving your medications? (Patient transferred to [facility] for ERCP/higher level of care from [facility]   Does the patient have transportation to healthcare appointments? (Patient transferred to [facility] for ERCP/higher level of care from [facility].)   Lives With (Patient transferred to [facility] for ERCP/higher level of care from [facility].)   Living Arrangements (Patient transferred to [facility] for ERCP/higher level of care from [facility].)   Does the patient have family/friends to help with healtcare needs after discharge? (Patient transferred to [facility] for ERCP/higher level of care from [facility].)   Who are your caregiver(s) and their phone number(s)? (Patient transferred to [facility] for ERCP/higher level of care from [facility].)   Does your caregiver provide all the help you need? (Patient transferred to [facility] for ERCP/higher level of care from [facility].)   If no, what kind of help do you need at home? (Patient transferred to [facility] for ERCP/higher level of care from [facility].)   Are you currently feeling confused? (Patient transferred to [facility] for ERCP/higher level of care from [facility].)   Are you currently having problems thinking? (Patient transferred to [facility] for ERCP/higher level of care from [facility].)   Are you currently having memory problems? (Patient transferred to [facility] for ERCP/higher level of care from [facility].)   Have you felt down, depressed, or hopeless? (Patient transferred to [facility] for ERCP/higher level of care from [facility].)   Have you felt little interest or pleasure in doing things? (Patient transferred to [facility] for ERCP/higher level of care from [facility].)   In the last 7 days, my sleep quality was: (Patient transferred to [facility] for ERCP/higher level of care from [facility].)      Detail Level: Simple

## 2021-11-02 NOTE — NURSING
EEG tech at bedside place pt on EEG. Fran Pan NP, with [facility] notified.    normal shape/ROM intact/strength intact

## 2022-08-11 NOTE — ASSESSMENT & PLAN NOTE
57 y.o. female PMH hepatitis C with transaminitis, CAD, thyroid disease, and h/o CVA on ASA, plavix. CT with R temporal ICH. CTA without evidence of etiology and ECHO EF 60, normal atrial size.      Antiplatelets: start ASA  Statins: Repeat LDL 170s; will start Pravastatin 40 mg; monitor LFTs  SBP < 140  Diagnostics: MRI brain with contrast in 1 month, Pending EEG  DVT ppx: TEDs, SCDs  PT/OT and speech to evaluate and treat  Neuro checks qh   Solaraze Counseling:  I discussed with the patient the risks of Solaraze including but not limited to erythema, scaling, itching, weeping, crusting, and pain.

## 2022-10-03 NOTE — CONSULTS
Dr. Ponce pt.   LMP: 8/29/2022  + UPT: yesterday with a faint line, today with a darker line (that were both positive)  Patient states she had tubal ligation about 2 years ago and wanted to know if she needs to come in for an appt or come in for labs.     I told patient I would discuss this with on call provider or NP and CB with plan of care. She v/u    [facility]  Gastroenterology  Consult Note    Patient Name: Malina Robles  MRN: [id number]  Admission Date: 7/18/2017  Hospital Length of Stay: 1 days  Code Status: Full Code   Attending Provider: Darwin Galindo MD   Consulting Provider: Douglas Frausto MD  Primary Care Physician: Jose Rios MD  Principal Problem:Calculus of bile duct without mention of cholecystitis, with obstruction    Gastroenterology  Consult performed by: DOUGLAS FRAUSTO  Consult ordered by: ELSY KO  Reason for consult: CBD stone        Subjective:     HPI:  This is a 56yo female transferred here for evaluation of choledocholithiasis. She presented from a nursing home when she was noted to be lethargic and found to have elevated liver function tests.  She is significantly jaundiced and was noted to have some low-grade temperatures overnight.  Workup at the outside hospital was reviewed noting dilated bile ducts with suspected bile duct stone and she underwent ERCP below the stone was unable to be extracted.  A biliary stent was placed.  Dr. Ayala and I have also discussed the case.  Currently she isn't unable to give any additional history at this time due to her mental status.  No vomiting.  No changes in bowel habits.      Subjective:     Interval History:     Review of Systems   Unable to perform ROS: Mental status change   All other systems reviewed and are negative.    Objective:     Vital Signs (Most Recent):  Temp: (!) 100.9 °F (38.3 °C) (07/19/17 0759)  Pulse: 70 (07/19/17 0800)  Resp: 18 (07/19/17 0800)  BP: (!) 162/78 (07/19/17 0759)  SpO2: 97 % (07/19/17 0800) Vital Signs (24h Range):  Temp:  [97.4 °F (36.3 °C)-100.9 °F (38.3 °C)] 100.9 °F (38.3 °C)  Pulse:  [59-82] 70  Resp:  [15-18] 18  SpO2:  [97 %-100 %] 97 %  BP: (130-190)/(63-98) 162/78        There is no height or weight on file to calculate BMI.      Intake/Output Summary (Last 24 hours) at 07/19/17 0921  Last data filed at  07/18/17 2258   Gross per 24 hour   Intake                0 ml   Output              300 ml   Net             -300 ml       Lines/Drains/Airways     Peripheral Intravenous Line                 Peripheral IV - Single Lumen 07/17/17 0039 Left Antecubital 2 days         Peripheral IV - Single Lumen 07/18/17 0545 Right Forearm 1 day                Physical Exam   Constitutional: She appears well-developed. No distress.   Elderly appearing   HENT:   Head: Normocephalic and atraumatic.   Eyes: Conjunctivae are normal. Scleral icterus is present.   Neck: Normal range of motion. Neck supple. No tracheal deviation present. No thyromegaly present.   Cardiovascular: Normal rate and regular rhythm.  Exam reveals no gallop and no friction rub.    No murmur heard.  Pulmonary/Chest: Effort normal and breath sounds normal. No respiratory distress. She has no wheezes.   Abdominal: Soft. Bowel sounds are normal. She exhibits distension. There is tenderness.   Musculoskeletal:        Right wrist: She exhibits normal range of motion and no tenderness.        Left wrist: She exhibits normal range of motion and no tenderness.   Lymphadenopathy:        Head (right side): No submental and no submandibular adenopathy present.        Head (left side): No submental and no submandibular adenopathy present.   Neurological:   Awake, unable to fully assess given status   Skin: Skin is warm and dry. No rash noted. She is not diaphoretic. No erythema.   Psychiatric:   Altered, not speaking   Nursing note and vitals reviewed.      Significant Labs:  CMP:   Recent Labs  Lab 07/18/17 2212   GLU 98   CALCIUM 9.1   ALBUMIN 2.4*   PROT 6.9   *   K 3.3*   CO2 23   CL 99   BUN 10   CREATININE 0.8   ALKPHOS 602*   *   *   BILITOT 14.3*     Coagulation:   Recent Labs  Lab 07/18/17 2212   INR 1.0   APTT 25.3         Significant Imaging:  Imaging results within the past 24 hours have been reviewed.    Assessment/Plan:     * Calculus of  bile duct without mention of cholecystitis, with obstruction    - d/w Dr. Galindo and Kwasi, plan to repeat ERCP    - risk factors CAD, chronically ill, bacteremia  - monitor LFTs  - monitor cultures  - abx  - NPO            Thank you for your consult. I will follow-up with patient. Please contact us if you have any additional questions.    Douglas Frausto MD  Gastroenterology  [facility]